# Patient Record
Sex: FEMALE | Race: WHITE | NOT HISPANIC OR LATINO | Employment: OTHER | URBAN - METROPOLITAN AREA
[De-identification: names, ages, dates, MRNs, and addresses within clinical notes are randomized per-mention and may not be internally consistent; named-entity substitution may affect disease eponyms.]

---

## 2017-01-03 DIAGNOSIS — J44.9 CHRONIC OBSTRUCTIVE PULMONARY DISEASE, UNSPECIFIED COPD TYPE: ICD-10-CM

## 2017-01-03 RX ORDER — BUDESONIDE AND FORMOTEROL FUMARATE DIHYDRATE 160; 4.5 UG/1; UG/1
AEROSOL RESPIRATORY (INHALATION)
Qty: 10.2 G | Refills: 0 | Status: SHIPPED | OUTPATIENT
Start: 2017-01-03

## 2017-01-03 RX ORDER — FENOFIBRATE 160 MG/1
TABLET ORAL
Qty: 30 TABLET | Refills: 0 | Status: SHIPPED | OUTPATIENT
Start: 2017-01-03 | End: 2017-03-30 | Stop reason: SDUPTHER

## 2017-01-03 RX ORDER — FAMOTIDINE 40 MG/1
TABLET, FILM COATED ORAL
Qty: 30 TABLET | Refills: 0 | Status: SHIPPED | OUTPATIENT
Start: 2017-01-03 | End: 2017-03-30 | Stop reason: SDUPTHER

## 2017-01-03 RX ORDER — AMLODIPINE BESYLATE 10 MG/1
TABLET ORAL
Qty: 30 TABLET | Refills: 0 | Status: SHIPPED | OUTPATIENT
Start: 2017-01-03 | End: 2017-03-30 | Stop reason: SDUPTHER

## 2017-01-11 ENCOUNTER — OFFICE VISIT (OUTPATIENT)
Dept: CARDIOLOGY | Facility: CLINIC | Age: 67
End: 2017-01-11
Payer: MEDICARE

## 2017-01-11 ENCOUNTER — LAB VISIT (OUTPATIENT)
Dept: LAB | Facility: HOSPITAL | Age: 67
End: 2017-01-11
Attending: FAMILY MEDICINE
Payer: MEDICARE

## 2017-01-11 ENCOUNTER — OFFICE VISIT (OUTPATIENT)
Dept: FAMILY MEDICINE | Facility: CLINIC | Age: 67
End: 2017-01-11
Payer: MEDICARE

## 2017-01-11 VITALS
SYSTOLIC BLOOD PRESSURE: 116 MMHG | OXYGEN SATURATION: 97 % | HEART RATE: 62 BPM | WEIGHT: 158.06 LBS | HEIGHT: 65 IN | BODY MASS INDEX: 26.33 KG/M2 | DIASTOLIC BLOOD PRESSURE: 66 MMHG | TEMPERATURE: 98 F

## 2017-01-11 VITALS
BODY MASS INDEX: 26.38 KG/M2 | WEIGHT: 158.31 LBS | HEIGHT: 65 IN | SYSTOLIC BLOOD PRESSURE: 103 MMHG | DIASTOLIC BLOOD PRESSURE: 64 MMHG | HEART RATE: 66 BPM

## 2017-01-11 DIAGNOSIS — E11.9 TYPE 2 DIABETES MELLITUS WITHOUT COMPLICATION: ICD-10-CM

## 2017-01-11 DIAGNOSIS — J06.9 UPPER RESPIRATORY TRACT INFECTION, UNSPECIFIED TYPE: Primary | ICD-10-CM

## 2017-01-11 DIAGNOSIS — I10 ESSENTIAL HYPERTENSION: ICD-10-CM

## 2017-01-11 DIAGNOSIS — Z95.1 S/P CABG (CORONARY ARTERY BYPASS GRAFT): Primary | ICD-10-CM

## 2017-01-11 DIAGNOSIS — B37.31 VAGINAL YEAST INFECTION: ICD-10-CM

## 2017-01-11 DIAGNOSIS — I73.9 PAD (PERIPHERAL ARTERY DISEASE): ICD-10-CM

## 2017-01-11 DIAGNOSIS — R55 SYNCOPE, UNSPECIFIED SYNCOPE TYPE: ICD-10-CM

## 2017-01-11 DIAGNOSIS — I25.10 CORONARY ARTERY DISEASE INVOLVING NATIVE CORONARY ARTERY OF NATIVE HEART WITHOUT ANGINA PECTORIS: ICD-10-CM

## 2017-01-11 PROCEDURE — 99499 UNLISTED E&M SERVICE: CPT | Mod: S$PBB,,, | Performed by: INTERNAL MEDICINE

## 2017-01-11 PROCEDURE — 3074F SYST BP LT 130 MM HG: CPT | Mod: S$GLB,,, | Performed by: INTERNAL MEDICINE

## 2017-01-11 PROCEDURE — 99999 PR PBB SHADOW E&M-EST. PATIENT-LVL IV: CPT | Mod: PBBFAC,,, | Performed by: NURSE PRACTITIONER

## 2017-01-11 PROCEDURE — 3074F SYST BP LT 130 MM HG: CPT | Mod: S$GLB,,, | Performed by: NURSE PRACTITIONER

## 2017-01-11 PROCEDURE — 99214 OFFICE O/P EST MOD 30 MIN: CPT | Mod: S$GLB,,, | Performed by: INTERNAL MEDICINE

## 2017-01-11 PROCEDURE — 36415 COLL VENOUS BLD VENIPUNCTURE: CPT | Mod: PO

## 2017-01-11 PROCEDURE — 99999 PR PBB SHADOW E&M-EST. PATIENT-LVL II: CPT | Mod: PBBFAC,,, | Performed by: INTERNAL MEDICINE

## 2017-01-11 PROCEDURE — 3078F DIAST BP <80 MM HG: CPT | Mod: S$GLB,,, | Performed by: INTERNAL MEDICINE

## 2017-01-11 PROCEDURE — 99213 OFFICE O/P EST LOW 20 MIN: CPT | Mod: S$GLB,,, | Performed by: NURSE PRACTITIONER

## 2017-01-11 PROCEDURE — 3078F DIAST BP <80 MM HG: CPT | Mod: S$GLB,,, | Performed by: NURSE PRACTITIONER

## 2017-01-11 PROCEDURE — 1159F MED LIST DOCD IN RCRD: CPT | Mod: S$GLB,,, | Performed by: NURSE PRACTITIONER

## 2017-01-11 PROCEDURE — 83036 HEMOGLOBIN GLYCOSYLATED A1C: CPT

## 2017-01-11 PROCEDURE — 1159F MED LIST DOCD IN RCRD: CPT | Mod: S$GLB,,, | Performed by: INTERNAL MEDICINE

## 2017-01-11 RX ORDER — AMOXICILLIN 875 MG/1
875 TABLET, FILM COATED ORAL 2 TIMES DAILY
Qty: 20 TABLET | Refills: 0 | Status: SHIPPED | OUTPATIENT
Start: 2017-01-11 | End: 2017-01-21

## 2017-01-11 RX ORDER — FLUCONAZOLE 150 MG/1
150 TABLET ORAL ONCE
Qty: 1 TABLET | Refills: 0 | Status: SHIPPED | OUTPATIENT
Start: 2017-01-11 | End: 2017-01-11

## 2017-01-11 NOTE — PROGRESS NOTES
Subjective:       Patient ID: Amber Sharp is a 66 y.o. female.    Chief Complaint: Cough (productive); Chills; and Nasal Congestion    Cough   This is a new problem. The current episode started 1 to 4 weeks ago. The problem has been unchanged. The problem occurs constantly. The cough is productive of sputum. Associated symptoms include ear congestion, nasal congestion, postnasal drip, rhinorrhea, a sore throat, shortness of breath and wheezing. Pertinent negatives include no chest pain, chills, ear pain, fever, headaches, heartburn, hemoptysis, myalgias, rash, sweats or weight loss. Treatments tried: symbicort and albuterol. The treatment provided mild relief. Her past medical history is significant for COPD.     Review of Systems   Constitutional: Negative for chills, fever and weight loss.   HENT: Positive for postnasal drip, rhinorrhea and sore throat. Negative for ear pain.    Respiratory: Positive for cough, shortness of breath and wheezing. Negative for hemoptysis.    Cardiovascular: Negative for chest pain.   Gastrointestinal: Negative for heartburn.   Musculoskeletal: Negative for myalgias.   Skin: Negative for rash.   Neurological: Negative for headaches.       Objective:      Physical Exam   Constitutional: She is oriented to person, place, and time. She appears well-nourished. She is active and cooperative.   HENT:   Head: Normocephalic and atraumatic.   Right Ear: Hearing, tympanic membrane, external ear and ear canal normal.   Left Ear: Hearing, tympanic membrane, external ear and ear canal normal.   Nose: Nose normal.   Mouth/Throat: Oropharynx is clear and moist and mucous membranes are normal. No oropharyngeal exudate, posterior oropharyngeal edema or posterior oropharyngeal erythema.   Eyes: Conjunctivae and EOM are normal. Pupils are equal, round, and reactive to light.   Neck: Normal range of motion. Neck supple.   Cardiovascular: Normal rate, regular rhythm, normal heart sounds and intact distal  pulses.    Pulmonary/Chest: Effort normal and breath sounds normal. She has no wheezes. She has no rales.   Abdominal: Soft. Bowel sounds are normal. There is no tenderness.   Lymphadenopathy:     She has no cervical adenopathy.   Neurological: She is alert and oriented to person, place, and time.   Skin: Skin is warm and dry.   Vitals reviewed.      Assessment:       1. Upper respiratory tract infection, unspecified type    2. Vaginal yeast infection        Plan:       Amber was seen today for cough, chills and nasal congestion.    Diagnoses and all orders for this visit:    Upper respiratory tract infection, unspecified type  -     amoxicillin (AMOXIL) 875 MG tablet; Take 1 tablet (875 mg total) by mouth 2 (two) times daily.  Rest and increase fluids  Return if symptoms worsen or fail to improve.    Vaginal yeast infection  -     fluconazole (DIFLUCAN) 150 MG Tab; Take 1 tablet (150 mg total) by mouth once.

## 2017-01-11 NOTE — PROGRESS NOTES
Subjective:    Patient ID:  Amber Sharp is a 66 y.o. female who presents for follow-up of CAD    HPI  She comes with no complaints, no chest pain, no shortness of breath  Did not tolerate imdur, though no chest pain    Review of Systems   Constitution: Negative for decreased appetite, weakness, malaise/fatigue, weight gain and weight loss.   Cardiovascular: Negative for chest pain, dyspnea on exertion, leg swelling, palpitations and syncope.   Respiratory: Negative for cough and shortness of breath.    Gastrointestinal: Negative.    All other systems reviewed and are negative.       Objective:    Physical Exam   Constitutional: She is oriented to person, place, and time. She appears well-developed and well-nourished.   HENT:   Head: Normocephalic.   Eyes: Pupils are equal, round, and reactive to light.   Neck: Normal range of motion. Neck supple. No JVD present. Carotid bruit is not present. No thyromegaly present.   Cardiovascular: Normal rate, regular rhythm, normal heart sounds, intact distal pulses and normal pulses.  PMI is not displaced.  Exam reveals no gallop.    No murmur heard.  Pulmonary/Chest: Effort normal and breath sounds normal.   Abdominal: Soft. Normal appearance. She exhibits no mass. There is no hepatosplenomegaly. There is no tenderness.   Musculoskeletal: Normal range of motion. She exhibits no edema.   Neurological: She is alert and oriented to person, place, and time. She has normal strength and normal reflexes. No sensory deficit.   Skin: Skin is warm and intact.   Psychiatric: She has a normal mood and affect.   Nursing note and vitals reviewed.        Assessment:       1. S/P CABG (coronary artery bypass graft) 1994, x 3    2. Coronary artery disease involving native coronary artery of native heart without angina pectoris    3. Essential hypertension    4. PAD (peripheral artery disease) - PTA to both LE    5. Syncope, unspecified syncope type         Plan:     Continue all cardiac  medications  Regular exercise program  6 m f/u

## 2017-01-11 NOTE — MR AVS SNAPSHOT
[facility]  [address]  [facility] [address]  Phone: [phone]  Fax: [phone]                  Amber Sharp   2017 10:20 AM   Office Visit    Description:  Female : 1950   Provider:  Fany Cruz NP   Department:  [facility]           Reason for Visit     Cough     Chills     Nasal Congestion           Diagnoses this Visit        Comments    Upper respiratory tract infection, unspecified type    -  Primary     Vaginal yeast infection                To Do List           Future Appointments        Provider Department Dept Phone    2017 11:15 AM LAB, [place] [facility] [phone]    2017 10:10 AM LAB, [place] [facility] [phone]    2017 3:00 PM Alina Brothers MD [facility] Hematology [phone]    2017 11:40 AM Mike King MD [place] [phone]      Goals (5 Years of Data)     None       These Medications        Disp Refills Start End    amoxicillin (AMOXIL) 875 MG tablet 20 tablet 0 2017    Take 1 tablet (875 mg total) by mouth 2 (two) times daily. - Oral    Pharmacy: [facility] [id number] [address] Ph #: [phone]       fluconazole (DIFLUCAN) 150 MG Tab 1 tablet 0 2017    Take 1 tablet (150 mg total) by mouth once. - Oral    Pharmacy: [facility] [address] [facility] [address] Ph #: [phone]         [facility] On Call     [facility] Care Line -  Assistance  Registered nurses in the [facility] provide clinical advisement, health education, appointment booking, and other advisory services.  Call for this free service at [phone].             Medications           START taking these NEW medications        Refills    amoxicillin (AMOXIL) 875 MG tablet 0    Sig: Take 1 tablet (875 mg total) by mouth 2 (two) times daily.     Class: Normal    Route: Oral    fluconazole (DIFLUCAN) 150 MG Tab 0    Sig: Take 1 tablet (150 mg total) by mouth once.    Class: Normal    Route: Oral           Verify that the below list of medications is an accurate representation of the medications you are currently taking.  If none reported, the list may be blank. If incorrect, please contact your healthcare provider. Carry this list with you in case of emergency.           Current Medications     albuterol (PROVENTIL HFA) 90 mcg/actuation inhaler Inhale 2 puffs into the lungs every 6 (six) hours as needed.    alendronate (FOSAMAX) 70 MG tablet TAKE ONE TABLET BY MOUTH ONCE DAILY EVERY 7 days    amlodipine (NORVASC) 10 MG tablet TAKE ONE TABLET BY MOUTH ONCE DAILY    aspirin (ECOTRIN) 81 MG EC tablet Take 81 mg by mouth once daily.      atorvastatin (LIPITOR) 40 MG tablet TAKE ONE TABLET BY MOUTH ONCE DAILY    citalopram (CELEXA) 20 MG tablet TAKE ONE TABLET BY MOUTH EVERY DAY    clopidogrel (PLAVIX) 75 mg tablet TAKE ONE TABLET BY MOUTH ONCE DAILY    cyanocobalamin 1,000 mcg/mL injection Inject 1 mL (1,000 mcg total) into the skin every 14 (fourteen) days.    famotidine (PEPCID) 40 MG tablet TAKE ONE TABLET BY MOUTH EVERY DAY    fenofibrate 160 MG Tab TAKE ONE TABLET BY MOUTH ONCE DAILY    fluticasone (FLONASE) 50 mcg/actuation nasal spray 2 sprays in each nostril once daily.    gabapentin (NEURONTIN) 300 MG capsule TAKE ONE CAPSULE BY MOUTH TWICE DAILY    lancets Misc 1 Units by Misc.(Non-Drug; Combo Route) route 3 (three) times daily.    lorazepam (ATIVAN) 1 MG tablet TAKE ONE TABLET BY MOUTH EVERY 8 HOURS AS NEEDED    metformin (GLUCOPHAGE) 500 MG tablet TAKE ONE TABLET BY MOUTH TWICE DAILY WITH FOOD    metoprolol succinate (TOPROL-XL) 25 MG 24 hr tablet TAKE ONE TABLET BY MOUTH ONCE DAILY    montelukast (SINGULAIR) 10 mg tablet Take 1 tablet (10 mg total) by mouth every evening.    SEROQUEL 100 mg Tab Take 1 tablet (100 mg total) by mouth every evening.     "SYMBICORT 160-4.5 mcg/actuation HFAA inhale TWO puffs BY MOUTH and into lungs TWICE DAILY    amoxicillin (AMOXIL) 875 MG tablet Take 1 tablet (875 mg total) by mouth 2 (two) times daily.    fluconazole (DIFLUCAN) 150 MG Tab Take 1 tablet (150 mg total) by mouth once.           Clinical Reference Information           Vital Signs - Last Recorded  Most recent update: 1/11/2017 10:29 AM by Deepika Arteaga LPN    BP Pulse Temp Ht Wt SpO2    116/66 (BP Location: Right arm, Patient Position: Sitting, BP Method: Manual) 62 97.9 °F (36.6 °C) (Oral) 5' 5" (1.651 m) 71.7 kg (158 lb 1.1 oz) 97%    BMI                26.3 kg/m2          Blood Pressure          Most Recent Value    BP  116/66      Allergies as of 1/11/2017     Adhesive    Carvedilol    Lisinopril    Losartan      Immunizations Administered on Date of Encounter - 1/11/2017     None      "

## 2017-01-12 LAB
ESTIMATED AVG GLUCOSE: 128 MG/DL
HBA1C MFR BLD HPLC: 6.1 %

## 2017-01-18 ENCOUNTER — LAB VISIT (OUTPATIENT)
Dept: LAB | Facility: HOSPITAL | Age: 67
End: 2017-01-18
Attending: INTERNAL MEDICINE
Payer: MEDICARE

## 2017-01-18 DIAGNOSIS — E53.8 B12 DEFICIENCY: ICD-10-CM

## 2017-01-18 LAB
ALBUMIN SERPL BCP-MCNC: 3.4 G/DL
ALP SERPL-CCNC: 47 U/L
ALT SERPL W/O P-5'-P-CCNC: 20 U/L
ANION GAP SERPL CALC-SCNC: 13 MMOL/L
AST SERPL-CCNC: 24 U/L
BASOPHILS # BLD AUTO: 0.02 K/UL
BASOPHILS NFR BLD: 0.3 %
BILIRUB SERPL-MCNC: 0.4 MG/DL
BUN SERPL-MCNC: 10 MG/DL
CALCIUM SERPL-MCNC: 8.9 MG/DL
CHLORIDE SERPL-SCNC: 107 MMOL/L
CO2 SERPL-SCNC: 22 MMOL/L
CREAT SERPL-MCNC: 1.1 MG/DL
DIFFERENTIAL METHOD: ABNORMAL
EOSINOPHIL # BLD AUTO: 0.3 K/UL
EOSINOPHIL NFR BLD: 4.2 %
ERYTHROCYTE [DISTWIDTH] IN BLOOD BY AUTOMATED COUNT: 16.2 %
EST. GFR  (AFRICAN AMERICAN): >60 ML/MIN/1.73 M^2
EST. GFR  (NON AFRICAN AMERICAN): 53 ML/MIN/1.73 M^2
GLUCOSE SERPL-MCNC: 180 MG/DL
HCT VFR BLD AUTO: 33.8 %
HGB BLD-MCNC: 11 G/DL
LYMPHOCYTES # BLD AUTO: 2.4 K/UL
LYMPHOCYTES NFR BLD: 39.8 %
MCH RBC QN AUTO: 28.7 PG
MCHC RBC AUTO-ENTMCNC: 32.5 %
MCV RBC AUTO: 88 FL
MONOCYTES # BLD AUTO: 0.5 K/UL
MONOCYTES NFR BLD: 7.9 %
NEUTROPHILS # BLD AUTO: 2.8 K/UL
NEUTROPHILS NFR BLD: 47.8 %
PLATELET # BLD AUTO: 405 K/UL
PMV BLD AUTO: 9.5 FL
POTASSIUM SERPL-SCNC: 4.4 MMOL/L
PROT SERPL-MCNC: 7.4 G/DL
RBC # BLD AUTO: 3.83 M/UL
SODIUM SERPL-SCNC: 142 MMOL/L
VIT B12 SERPL-MCNC: 242 PG/ML
WBC # BLD AUTO: 5.93 K/UL

## 2017-01-18 PROCEDURE — 85025 COMPLETE CBC W/AUTO DIFF WBC: CPT | Mod: PO

## 2017-01-18 PROCEDURE — 80053 COMPREHEN METABOLIC PANEL: CPT | Mod: PO

## 2017-01-18 PROCEDURE — 82607 VITAMIN B-12: CPT

## 2017-01-18 PROCEDURE — 36415 COLL VENOUS BLD VENIPUNCTURE: CPT | Mod: PO

## 2017-01-26 ENCOUNTER — OFFICE VISIT (OUTPATIENT)
Dept: HEMATOLOGY/ONCOLOGY | Facility: CLINIC | Age: 67
End: 2017-01-26
Payer: MEDICARE

## 2017-01-26 VITALS
DIASTOLIC BLOOD PRESSURE: 61 MMHG | TEMPERATURE: 99 F | SYSTOLIC BLOOD PRESSURE: 118 MMHG | RESPIRATION RATE: 15 BRPM | HEART RATE: 67 BPM | WEIGHT: 157.88 LBS | BODY MASS INDEX: 26.3 KG/M2 | HEIGHT: 65 IN

## 2017-01-26 DIAGNOSIS — E53.8 B12 DEFICIENCY: Primary | ICD-10-CM

## 2017-01-26 DIAGNOSIS — Z95.1 S/P CABG (CORONARY ARTERY BYPASS GRAFT): ICD-10-CM

## 2017-01-26 DIAGNOSIS — C50.011 MALIGNANT NEOPLASM INVOLVING BOTH NIPPLE AND AREOLA OF RIGHT BREAST IN FEMALE: ICD-10-CM

## 2017-01-26 DIAGNOSIS — I25.10 CORONARY ARTERY DISEASE INVOLVING NATIVE CORONARY ARTERY OF NATIVE HEART WITHOUT ANGINA PECTORIS: ICD-10-CM

## 2017-01-26 PROCEDURE — 3074F SYST BP LT 130 MM HG: CPT | Mod: S$GLB,,, | Performed by: INTERNAL MEDICINE

## 2017-01-26 PROCEDURE — 99499 UNLISTED E&M SERVICE: CPT | Mod: S$PBB,,, | Performed by: INTERNAL MEDICINE

## 2017-01-26 PROCEDURE — 99214 OFFICE O/P EST MOD 30 MIN: CPT | Mod: S$GLB,,, | Performed by: INTERNAL MEDICINE

## 2017-01-26 PROCEDURE — 1159F MED LIST DOCD IN RCRD: CPT | Mod: S$GLB,,, | Performed by: INTERNAL MEDICINE

## 2017-01-26 PROCEDURE — 99999 PR PBB SHADOW E&M-EST. PATIENT-LVL III: CPT | Mod: PBBFAC,,, | Performed by: INTERNAL MEDICINE

## 2017-01-26 PROCEDURE — 3078F DIAST BP <80 MM HG: CPT | Mod: S$GLB,,, | Performed by: INTERNAL MEDICINE

## 2017-01-26 RX ORDER — KETOCONAZOLE 20 MG/G
CREAM TOPICAL
Refills: 5 | COMMUNITY
Start: 2017-01-18

## 2017-01-27 DIAGNOSIS — Z76.89 ENCOUNTER TO ESTABLISH CARE: Primary | ICD-10-CM

## 2017-01-27 NOTE — PROGRESS NOTES
Subjective:       Patient ID: Amber Sharp is a 66 y.o. female.    Chief Complaint: f/u of labs  HPI:   A 65-year-old  woman coming in followup. The patient has a remote    history of breast cancer that was treated in the past. This was over seven    years ago in 2007, treated at [facility] with mitoxantrone and Cytoxan because    of extensive coronary artery disease and peripheral vascular disease. She    underwent double mastectomy, radiation therapy and adjuvant chemotherapy. There  are no breast left for mammograms. Also had severe B12 deficiency. She swears  by compliance. This time, she also has an extensive medical hx of DM, HTN , dyslipidemia, CAD, s/p CABG, on multiple meds for control and depression    REVIEW OF SYSTEMS:     CONSTITUTIONAL: The patient denies any weight change. There is no apparent    change in appetite, fever, night sweats, headaches, fatigue+ dizziness, or    weakness.      SKIN: Denies rash, issues with nails, non-healing sores, bleeding, blotching    skin or abnormal bruising. Denies new moles or changes to existing moles.      BREASTS: There is no swelling around breasts or nipple discharge.    EYES: Denies eye pain, blurred vision, swelling, redness or discharge.      ENT AND MOUTH: Denies runny nose, stuffiness, sinus trouble or sores. Denies    nosebleeds. Denies, hoarseness, change in voice or swelling in front of the    neck.      CARDIOVASCULAR: Denies chest pain, discomfort or palpitations. Denies neck    swelling or episodes of passing out.      RESPIRATORY: Denies cough, sputum production, blood in sputum, and denies    shortness of breath.      GI: Denies trouble swallowing, indigestion, heartburn, abdominal pain, nausea,    vomiting, diarrhea, altered bowel habits, blood in stool, discoloration of    stools, change in nature of stool, bloating, increased abdominal girth.      GENITOURINARY: No discharge. No pelvic pain or lumps. No rash around groin or  lesions.  No urinary frequency, hesitation, painful urination or blood in    urine. Denies incontinence. No problems with intercourse.      MUSCULOSKELETAL: Denies neck or back pain. Denies weakness in arms or legs,    joint problems or distended inflamed veins in legs. Denies swelling or abnormal  glands.      NEUROLOGICAL: Denies tingling, numbness, altered mentation changes to nerve    function in the face, weakness to one or both of the body. Denies changes to    gait and denies multiple falls or accidents.    Hx of depression+    PHYSICAL EXAM:     Vitals:    01/26/17 1453   BP: 118/61   Pulse: 67   Resp: 15   Temp: 98.6 °F (37 °C)       GENERAL: Comfortable looking patient. Patient is in no distress.  Awake, alert and oriented to time, person and place.  No anxiety, or agitation.      HEENT: Normal conjunctivae and eyelids. WNL.  PERRLA 3 to 4 mm. No icterus, no pallor, no congestion, and no discharge noted.     NECK:  Supple. Trachea is central.  No crepitus.  No JVD or masses.    RESPIRATORY:  No intercostal retractions.  No dullness to percussion.  Chest is clear to auscultation.  No rales, rhonchi or wheezes.  No crepitus.  Good air entry bilaterally.    CARDIOVASCULAR:  S1 and S2 are normally heard without murmurs or gallops.  All peripheral pulses are present.    ABDOMEN:  Normal abdomen.  No hepatosplenomegaly.  No free fluid.  Bowel sounds are present.  No hernia noted. No masses.  No rebound or tenderness.  No guarding or rigidity.  Umbilicus is midline.    LYMPHATICS:  No axillary, cervical, supraclavicular, submental, or inguinal lymphadenopathy.    SKIN/MUSCULOSKELETAL:  There is no evidence of excoriation marks or ecchmosis.  No rashes.  No cyanosis.  No clubbing.  No joint or skeletal deformities noted.  Normal range of motion.    NEUROLOGIC:  Higher functions are appropriate.  No cranial nerve deficits.  Normal thai.  Normal strength.  Motor and sensory functions are normal.  Deep tendon reflexes are  normal.    GENITAL/RECTAL:  Exams are deferred.      Laboratory:     CBC:  Lab Results   Component Value Date    WBC 5.93 01/18/2017    RBC 3.83 (L) 01/18/2017    HGB 11.0 (L) 01/18/2017    HCT 33.8 (L) 01/18/2017    MCV 88 01/18/2017    MCH 28.7 01/18/2017    MCHC 32.5 01/18/2017    RDW 16.2 (H) 01/18/2017     (H) 01/18/2017    MPV 9.5 01/18/2017    GRAN 2.8 01/18/2017    GRAN 47.8 01/18/2017    LYMPH 2.4 01/18/2017    LYMPH 39.8 01/18/2017    MONO 0.5 01/18/2017    MONO 7.9 01/18/2017    EOS 0.3 01/18/2017    BASO 0.02 01/18/2017    EOSINOPHIL 4.2 01/18/2017    BASOPHIL 0.3 01/18/2017       BMP: BMP  Lab Results   Component Value Date     01/18/2017    K 4.4 01/18/2017     01/18/2017    CO2 22 (L) 01/18/2017    BUN 10 01/18/2017    CREATININE 1.1 01/18/2017    CALCIUM 8.9 01/18/2017    ANIONGAP 13 01/18/2017    ESTGFRAFRICA >60 01/18/2017    EGFRNONAA 53 (A) 01/18/2017       LFT:   Lab Results   Component Value Date    ALT 20 01/18/2017    AST 24 01/18/2017    ALKPHOS 47 (L) 01/18/2017    BILITOT 0.4 01/18/2017     Lab Results   Component Value Date    DJDXUKOI75 242 01/18/2017       Assessment/Plan:       1. B12 deficiency    2. Malignant neoplasm involving both nipple and areola of right breast in female    3. S/P CABG (coronary artery bypass graft) 1994, x 3    4. Coronary artery disease involving native coronary artery of native heart without angina pectoris        1. B12 still not clinically sufficient , will add SL b12 daily to bi monthly injections  Pt getting these shots at pharmacy.   Cont care with cards and pcp fpr all other issue  rtc 3 months with cbc, cmp,b12

## 2017-01-31 ENCOUNTER — OFFICE VISIT (OUTPATIENT)
Dept: FAMILY MEDICINE | Facility: CLINIC | Age: 67
End: 2017-01-31
Payer: MEDICARE

## 2017-01-31 VITALS
WEIGHT: 158.06 LBS | DIASTOLIC BLOOD PRESSURE: 68 MMHG | SYSTOLIC BLOOD PRESSURE: 118 MMHG | OXYGEN SATURATION: 98 % | BODY MASS INDEX: 26.33 KG/M2 | HEART RATE: 73 BPM | HEIGHT: 65 IN

## 2017-01-31 DIAGNOSIS — J31.0 CHRONIC RHINITIS: ICD-10-CM

## 2017-01-31 DIAGNOSIS — E08.00 DIABETES MELLITUS DUE TO UNDERLYING CONDITION WITH HYPEROSMOLARITY WITHOUT COMA, WITHOUT LONG-TERM CURRENT USE OF INSULIN: Primary | ICD-10-CM

## 2017-01-31 DIAGNOSIS — R42 VERTIGO: ICD-10-CM

## 2017-01-31 DIAGNOSIS — I10 ESSENTIAL HYPERTENSION: ICD-10-CM

## 2017-01-31 PROCEDURE — 99999 PR PBB SHADOW E&M-EST. PATIENT-LVL III: CPT | Mod: PBBFAC,,, | Performed by: FAMILY MEDICINE

## 2017-01-31 PROCEDURE — 3074F SYST BP LT 130 MM HG: CPT | Mod: S$GLB,,, | Performed by: FAMILY MEDICINE

## 2017-01-31 PROCEDURE — 3078F DIAST BP <80 MM HG: CPT | Mod: S$GLB,,, | Performed by: FAMILY MEDICINE

## 2017-01-31 PROCEDURE — 99214 OFFICE O/P EST MOD 30 MIN: CPT | Mod: S$GLB,,, | Performed by: FAMILY MEDICINE

## 2017-01-31 PROCEDURE — 99499 UNLISTED E&M SERVICE: CPT | Mod: S$PBB,,, | Performed by: FAMILY MEDICINE

## 2017-01-31 PROCEDURE — 1159F MED LIST DOCD IN RCRD: CPT | Mod: S$GLB,,, | Performed by: FAMILY MEDICINE

## 2017-01-31 RX ORDER — MECLIZINE HYDROCHLORIDE 25 MG/1
25 TABLET ORAL 3 TIMES DAILY PRN
Qty: 60 TABLET | Refills: 1 | Status: SHIPPED | OUTPATIENT
Start: 2017-01-31 | End: 2017-03-01 | Stop reason: SDUPTHER

## 2017-01-31 RX ORDER — FLUTICASONE PROPIONATE 50 MCG
2 SPRAY, SUSPENSION (ML) NASAL DAILY
Qty: 16 G | Refills: 11 | Status: SHIPPED | OUTPATIENT
Start: 2017-01-31 | End: 2017-12-22

## 2017-01-31 RX ORDER — INSULIN PUMP SYRINGE, 3 ML
EACH MISCELLANEOUS
Qty: 1 EACH | Refills: 0 | Status: SHIPPED | OUTPATIENT
Start: 2017-01-31

## 2017-01-31 RX ORDER — LANCETS
1 EACH MISCELLANEOUS 2 TIMES DAILY
Qty: 100 EACH | Refills: 3 | Status: SHIPPED | OUTPATIENT
Start: 2017-01-31 | End: 2017-05-03 | Stop reason: SDUPTHER

## 2017-01-31 NOTE — PROGRESS NOTES
Subjective:       Patient ID: Amber Sharp is a 66 y.o. female.    Chief Complaint: Dizziness (vertigo )    HPI     C/o vertigo x 3 days.  Movement of head reproduces dizziness. Reports that meclizine helped in past.  No c/o cp or sob at rest or exertion.      Dm2 with neuropathy:  cks 4 times a week  Fbs:   On metformin  a1c 1/11/17-6.1%  utd with eye dr     Reports chronic rhinorrhea. Reports using flonase in past.     Review of Systems      Review of Systems   Constitutional: Negative for fever and chills.   HENT: Negative for hearing loss and neck stiffness.    Eyes: Negative for redness and itching.   Respiratory: Negative for cough and choking.    Cardiovascular: Negative for chest pain and leg swelling.  Abdomen: Negative for abdominal pain and blood in stool.   Genitourinary: Negative for dysuria and flank pain.   Musculoskeletal: Negative for back pain and gait problem.   Neurological: Negative for headaches.   Hematological: Negative for adenopathy.   Psychiatric/Behavioral: Negative for behavioral problems.     Objective:      Physical Exam   Constitutional: She appears well-developed.   HENT:   Head: Normocephalic and atraumatic.   Eyes: Conjunctivae are normal. Pupils are equal, round, and reactive to light.   Neck: Normal range of motion.   Cardiovascular: Normal rate and regular rhythm.    No murmur heard.  Pulmonary/Chest: Effort normal and breath sounds normal. She has no wheezes.   Lymphadenopathy:     She has no cervical adenopathy.       Assessment:       1. Diabetes mellitus due to underlying condition with hyperosmolarity without coma, without long-term current use of insulin    2. Vertigo    3. Essential hypertension    4. Chronic rhinitis        Plan:       Diabetes mellitus due to underlying condition with hyperosmolarity without coma, without long-term current use of insulin    Vertigo    Essential hypertension    Chronic rhinitis    Other orders  -     meclizine (ANTIVERT) 25 mg  tablet; Take 1 tablet (25 mg total) by mouth 3 (three) times daily as needed.  Dispense: 60 tablet; Refill: 1  -     blood sugar diagnostic Strp; 1 strip by Misc.(Non-Drug; Combo Route) route 2 (two) times daily.  Dispense: 100 strip; Refill: 3  -     lancets Misc; 1 Units by Misc.(Non-Drug; Combo Route) route 2 (two) times daily.  Dispense: 100 each; Refill: 3  -     blood-glucose meter kit; Use as instructed  Dispense: 1 each; Refill: 0  -     fluticasone (FLONASE) 50 mcg/actuation nasal spray; 2 sprays by Each Nare route once daily.  Dispense: 16 g; Refill: 11      Plan:  Start antivert  Restart flonase

## 2017-01-31 NOTE — MR AVS SNAPSHOT
[facility]  [address] [facility] [address] [place] [address]  Phone: [phone]  Fax: [phone]                  Amber Sharp   2017 11:40 AM   Office Visit    Description:  Female : 1950   Provider:  Mike King MD   Department:  [facility]           Reason for Visit     Dizziness                To Do List           Future Appointments        Provider Department Dept Phone    2017 10:30 AM LABORATORY, [place] [facility] [phone]    2017 2:40 PM Alina Brothers MD [facility] Hematology [phone]    5/3/2017 11:40 AM Mike King MD [facility] [phone]      Goals (5 Years of Data)     None       These Medications        Disp Refills Start End    meclizine (ANTIVERT) 25 mg tablet 60 tablet 1 2017     Take 1 tablet (25 mg total) by mouth 3 (three) times daily as needed. - Oral    Pharmacy: [facility] [place]  [id number]  Ph #: [phone]       blood sugar diagnostic Strp 100 strip 3 2017     1 strip by Misc.(Non-Drug; Combo Route) route 2 (two) times daily. - Misc.(Non-Drug; Combo Route)    Pharmacy: [place]  [address] [facility]  Ph #: [phone]       lancets Misc 100 each 3 2017     1 Units by Misc.(Non-Drug; Combo Route) route 2 (two) times daily. - Misc.(Non-Drug; Combo Route)    Pharmacy: [facility] [place]  [id number]  Ph #: [phone]       blood-glucose meter kit 1 each 0 2017     Use as instructed    Pharmacy: [facility]  [id number]  Ph #: [phone]       fluticasone (FLONASE) 50 mcg/actuation nasal spray 16 g 11 2017     2 sprays by Each Nare route once daily. - Each Nare    Pharmacy: [facility] [place] [address] Ph #: [phone]         [facility]     [facility]  Line - / Assistance  Registered nurses in the [facility] provide clinical advisement, health education, appointment booking, and other advisory services.  Call for this free service at [phone].             Medications           START taking these NEW medications        Refills    meclizine (ANTIVERT) 25 mg tablet 1    Sig: Take 1 tablet (25 mg total) by mouth 3 (three) times daily as needed.    Class: Normal    Route: Oral    blood sugar diagnostic Strp 3    Si strip by Misc.(Non-Drug; Combo Route) route 2 (two) times daily.    Class: Normal    Route: Misc.(Non-Drug; Combo Route)    lancets Misc 3    Si Units by Misc.(Non-Drug; Combo Route) route 2 (two) times daily.    Class: Normal    Route: Misc.(Non-Drug; Combo Route)    blood-glucose meter kit 0    Sig: Use as instructed    Class: Normal    fluticasone (FLONASE) 50 mcg/actuation nasal spray 11    Si sprays by Each Nare route once daily.    Class: Normal    Route: Each Nare           Verify that the below list of medications is an accurate representation of the medications you are currently taking.  If none reported, the list may be blank. If incorrect, please contact your healthcare provider. Carry this list with you in case of emergency.           Current Medications     albuterol (PROVENTIL HFA) 90 mcg/actuation inhaler Inhale 2 puffs into the lungs every 6 (six) hours as needed.    alendronate (FOSAMAX) 70 MG tablet TAKE ONE TABLET BY MOUTH ONCE DAILY EVERY 7 days    amlodipine (NORVASC) 10 MG tablet TAKE ONE TABLET BY MOUTH ONCE DAILY    aspirin (ECOTRIN) 81 MG EC tablet Take 81 mg by mouth once daily.      atorvastatin (LIPITOR) 40 MG tablet TAKE ONE TABLET BY MOUTH ONCE DAILY    citalopram (CELEXA) 20 MG tablet TAKE ONE TABLET BY MOUTH EVERY DAY    clopidogrel (PLAVIX) 75 mg tablet TAKE ONE TABLET BY MOUTH ONCE DAILY    cyanocobalamin 1,000 mcg/mL injection Inject 1 mL (1,000 mcg total) into the skin every 14 (fourteen)  "days.    famotidine (PEPCID) 40 MG tablet TAKE ONE TABLET BY MOUTH EVERY DAY    fenofibrate 160 MG Tab TAKE ONE TABLET BY MOUTH ONCE DAILY    gabapentin (NEURONTIN) 300 MG capsule TAKE ONE CAPSULE BY MOUTH TWICE DAILY    ketoconazole (NIZORAL) 2 % cream APPLY TO THE AFFECTED AREA twice daily use with desonide    lancets Misc 1 Units by Misc.(Non-Drug; Combo Route) route 3 (three) times daily.    lorazepam (ATIVAN) 1 MG tablet TAKE ONE TABLET BY MOUTH EVERY 8 HOURS AS NEEDED    metformin (GLUCOPHAGE) 500 MG tablet TAKE ONE TABLET BY MOUTH TWICE DAILY WITH FOOD    metoprolol succinate (TOPROL-XL) 25 MG 24 hr tablet TAKE ONE TABLET BY MOUTH ONCE DAILY    montelukast (SINGULAIR) 10 mg tablet Take 1 tablet (10 mg total) by mouth every evening.    SEROQUEL 100 mg Tab Take 1 tablet (100 mg total) by mouth every evening.    SYMBICORT 160-4.5 mcg/actuation HFAA inhale TWO puffs BY MOUTH and into lungs TWICE DAILY    blood sugar diagnostic Strp 1 strip by Misc.(Non-Drug; Combo Route) route 2 (two) times daily.    blood-glucose meter kit Use as instructed    fluticasone (FLONASE) 50 mcg/actuation nasal spray 2 sprays by Each Nare route once daily.    lancets Misc 1 Units by Misc.(Non-Drug; Combo Route) route 2 (two) times daily.    meclizine (ANTIVERT) 25 mg tablet Take 1 tablet (25 mg total) by mouth 3 (three) times daily as needed.           Clinical Reference Information           Vital Signs - Last Recorded  Most recent update: 1/31/2017 11:35 AM by Dinah Shane LPN    BP Pulse Ht Wt SpO2 BMI    118/68 73 5' 5" (1.651 m) 71.7 kg (158 lb 1.1 oz) 98% 26.3 kg/m2      Blood Pressure          Most Recent Value    BP  118/68      Allergies as of 1/31/2017     Adhesive    Carvedilol    Lisinopril    Losartan      Immunizations Administered on Date of Encounter - 1/31/2017     None      "

## 2017-02-01 RX ORDER — ATORVASTATIN CALCIUM 40 MG/1
TABLET, FILM COATED ORAL
Qty: 90 TABLET | Refills: 3 | Status: ON HOLD | OUTPATIENT
Start: 2017-02-01 | End: 2018-01-08

## 2017-02-02 ENCOUNTER — TELEPHONE (OUTPATIENT)
Dept: FAMILY MEDICINE | Facility: CLINIC | Age: 67
End: 2017-02-02

## 2017-02-02 NOTE — TELEPHONE ENCOUNTER
----- Message from Roxy Patel sent at 2/2/2017  2:53 PM CST -----  Contact: [facility] [phone]  She is calling to request an order for the glucometer and diabetic supplies.  They also need the clinical notes and the medical necessity form. Please fax it [phone].  Thank you!

## 2017-03-01 DIAGNOSIS — F31.9 BIPOLAR AFFECTIVE DISORDER: ICD-10-CM

## 2017-03-03 RX ORDER — MECLIZINE HYDROCHLORIDE 25 MG/1
TABLET ORAL
Qty: 60 TABLET | Refills: 5 | Status: SHIPPED | OUTPATIENT
Start: 2017-03-03 | End: 2017-05-03

## 2017-03-03 RX ORDER — QUETIAPINE FUMARATE 100 MG/1
TABLET, FILM COATED ORAL
Qty: 30 TABLET | Refills: 5 | Status: SHIPPED | OUTPATIENT
Start: 2017-03-03 | End: 2017-08-21 | Stop reason: SDUPTHER

## 2017-04-02 RX ORDER — FENOFIBRATE 160 MG/1
TABLET ORAL
Qty: 30 TABLET | Refills: 5 | Status: SHIPPED | OUTPATIENT
Start: 2017-04-02 | End: 2017-09-06 | Stop reason: SDUPTHER

## 2017-04-02 RX ORDER — FAMOTIDINE 40 MG/1
TABLET, FILM COATED ORAL
Qty: 30 TABLET | Refills: 5 | Status: SHIPPED | OUTPATIENT
Start: 2017-04-02 | End: 2017-07-20

## 2017-04-02 RX ORDER — AMLODIPINE BESYLATE 10 MG/1
TABLET ORAL
Qty: 30 TABLET | Refills: 5 | Status: SHIPPED | OUTPATIENT
Start: 2017-04-02 | End: 2017-09-06 | Stop reason: SDUPTHER

## 2017-04-02 RX ORDER — METOPROLOL SUCCINATE 25 MG/1
TABLET, EXTENDED RELEASE ORAL
Qty: 30 TABLET | Refills: 5 | Status: SHIPPED | OUTPATIENT
Start: 2017-04-02 | End: 2017-10-20 | Stop reason: SDUPTHER

## 2017-04-18 ENCOUNTER — PATIENT OUTREACH (OUTPATIENT)
Dept: ADMINISTRATIVE | Facility: HOSPITAL | Age: 67
End: 2017-04-18

## 2017-04-24 ENCOUNTER — TELEPHONE (OUTPATIENT)
Dept: HEMATOLOGY/ONCOLOGY | Facility: CLINIC | Age: 67
End: 2017-04-24

## 2017-04-24 NOTE — TELEPHONE ENCOUNTER
Left message for pt to return call to clinic to reschedule her missed lab appointment & that Dr. Brothers needs the results prior to her appointment. Number provided for pt to call back.

## 2017-04-26 ENCOUNTER — PATIENT OUTREACH (OUTPATIENT)
Dept: ADMINISTRATIVE | Facility: HOSPITAL | Age: 67
End: 2017-04-26

## 2017-04-26 NOTE — LETTER
April 26, 2017    Amber Sharp  [address] [place] [address]             [facility]  [address]  [facility] [address]  Phone: [phone] Dear Ms. Sharp:    We have tried to reach you by mychart unsuccessfully.    [facility] is committed to your overall health.  To help you get the most out of each of your visits, we will review your information to make sure you are up to date on all of your recommended tests and/or procedures.       Dr. King      has found that you may be due for:     Osteoporosis screening (DEXA SCAN)   Pneumonia immunization   Annual Dilated Eye Exam     Also, if you have any type of Advanced Directives, please bring them with you to your office visit so we may scan them into your chart.       If you have had any of the above done at another facility, please bring the records or information with you so that your record at [facility] will be complete.      If you are currently taking medication , please bring it with you to your appointment for review.     Sincerely,    Ligia Ramesh  Clinical Care Coordinator  [facility] [address] [place] [address]  Phone: [phone]   Fax: [phone]

## 2017-05-03 ENCOUNTER — OFFICE VISIT (OUTPATIENT)
Dept: FAMILY MEDICINE | Facility: CLINIC | Age: 67
End: 2017-05-03
Payer: MEDICARE

## 2017-05-03 ENCOUNTER — LAB VISIT (OUTPATIENT)
Dept: LAB | Facility: HOSPITAL | Age: 67
End: 2017-05-03
Attending: INTERNAL MEDICINE
Payer: MEDICARE

## 2017-05-03 VITALS
OXYGEN SATURATION: 98 % | DIASTOLIC BLOOD PRESSURE: 64 MMHG | BODY MASS INDEX: 24.5 KG/M2 | SYSTOLIC BLOOD PRESSURE: 104 MMHG | WEIGHT: 147.06 LBS | HEART RATE: 89 BPM | HEIGHT: 65 IN

## 2017-05-03 DIAGNOSIS — I25.10 CORONARY ARTERY DISEASE INVOLVING NATIVE CORONARY ARTERY OF NATIVE HEART WITHOUT ANGINA PECTORIS: ICD-10-CM

## 2017-05-03 DIAGNOSIS — R63.4 WEIGHT LOSS, NON-INTENTIONAL: ICD-10-CM

## 2017-05-03 DIAGNOSIS — E08.00 DIABETES MELLITUS DUE TO UNDERLYING CONDITION WITH HYPEROSMOLARITY WITHOUT COMA, WITHOUT LONG-TERM CURRENT USE OF INSULIN: Primary | ICD-10-CM

## 2017-05-03 DIAGNOSIS — Z78.0 MENOPAUSE: ICD-10-CM

## 2017-05-03 DIAGNOSIS — E53.8 B12 DEFICIENCY: ICD-10-CM

## 2017-05-03 DIAGNOSIS — I10 ESSENTIAL HYPERTENSION: ICD-10-CM

## 2017-05-03 LAB
ALBUMIN SERPL BCP-MCNC: 3.6 G/DL
ALP SERPL-CCNC: 58 U/L
ALT SERPL W/O P-5'-P-CCNC: 12 U/L
ANION GAP SERPL CALC-SCNC: 13 MMOL/L
AST SERPL-CCNC: 22 U/L
BASOPHILS # BLD AUTO: 0.02 K/UL
BASOPHILS NFR BLD: 0.2 %
BILIRUB SERPL-MCNC: 0.5 MG/DL
BUN SERPL-MCNC: 14 MG/DL
CALCIUM SERPL-MCNC: 9.2 MG/DL
CHLORIDE SERPL-SCNC: 106 MMOL/L
CO2 SERPL-SCNC: 24 MMOL/L
CREAT SERPL-MCNC: 1.2 MG/DL
DIFFERENTIAL METHOD: ABNORMAL
EOSINOPHIL # BLD AUTO: 0.2 K/UL
EOSINOPHIL NFR BLD: 1.6 %
ERYTHROCYTE [DISTWIDTH] IN BLOOD BY AUTOMATED COUNT: 16.2 %
EST. GFR  (AFRICAN AMERICAN): 55 ML/MIN/1.73 M^2
EST. GFR  (NON AFRICAN AMERICAN): 48 ML/MIN/1.73 M^2
GLUCOSE SERPL-MCNC: 129 MG/DL
HCT VFR BLD AUTO: 33.6 %
HGB BLD-MCNC: 11 G/DL
LYMPHOCYTES # BLD AUTO: 2.7 K/UL
LYMPHOCYTES NFR BLD: 27 %
MCH RBC QN AUTO: 27.8 PG
MCHC RBC AUTO-ENTMCNC: 32.7 %
MCV RBC AUTO: 85 FL
MONOCYTES # BLD AUTO: 0.8 K/UL
MONOCYTES NFR BLD: 7.6 %
NEUTROPHILS # BLD AUTO: 6.3 K/UL
NEUTROPHILS NFR BLD: 63.6 %
PLATELET # BLD AUTO: 481 K/UL
PMV BLD AUTO: 10.2 FL
POTASSIUM SERPL-SCNC: 4.2 MMOL/L
PROT SERPL-MCNC: 8.5 G/DL
RBC # BLD AUTO: 3.95 M/UL
SODIUM SERPL-SCNC: 143 MMOL/L
VIT B12 SERPL-MCNC: 693 PG/ML
WBC # BLD AUTO: 9.89 K/UL

## 2017-05-03 PROCEDURE — 3074F SYST BP LT 130 MM HG: CPT | Mod: S$GLB,,, | Performed by: FAMILY MEDICINE

## 2017-05-03 PROCEDURE — G0009 ADMIN PNEUMOCOCCAL VACCINE: HCPCS | Mod: S$GLB,,, | Performed by: FAMILY MEDICINE

## 2017-05-03 PROCEDURE — 1160F RVW MEDS BY RX/DR IN RCRD: CPT | Mod: S$GLB,,, | Performed by: FAMILY MEDICINE

## 2017-05-03 PROCEDURE — 99999 PR PBB SHADOW E&M-EST. PATIENT-LVL III: CPT | Mod: PBBFAC,,, | Performed by: FAMILY MEDICINE

## 2017-05-03 PROCEDURE — 90670 PCV13 VACCINE IM: CPT | Mod: S$GLB,,, | Performed by: FAMILY MEDICINE

## 2017-05-03 PROCEDURE — 99499 UNLISTED E&M SERVICE: CPT | Mod: S$PBB,,, | Performed by: FAMILY MEDICINE

## 2017-05-03 PROCEDURE — 99214 OFFICE O/P EST MOD 30 MIN: CPT | Mod: 25,S$GLB,, | Performed by: FAMILY MEDICINE

## 2017-05-03 PROCEDURE — 3078F DIAST BP <80 MM HG: CPT | Mod: S$GLB,,, | Performed by: FAMILY MEDICINE

## 2017-05-03 RX ORDER — AZELASTINE 1 MG/ML
1 SPRAY, METERED NASAL 2 TIMES DAILY
Qty: 30 ML | Refills: 11 | Status: SHIPPED | OUTPATIENT
Start: 2017-05-03 | End: 2017-12-22

## 2017-05-03 RX ORDER — VITAMIN B COMPLEX
2500 TABLET ORAL 4 TIMES DAILY
COMMUNITY
End: 2017-10-20

## 2017-05-03 RX ORDER — HYDROCORTISONE 25 MG/G
1 CREAM TOPICAL 2 TIMES DAILY
Refills: 2 | COMMUNITY
Start: 2017-03-30 | End: 2017-07-20 | Stop reason: SDUPTHER

## 2017-05-03 RX ORDER — FLUOCINONIDE TOPICAL SOLUTION USP, 0.05% 0.5 MG/ML
SOLUTION TOPICAL
Refills: 5 | COMMUNITY
Start: 2017-01-30 | End: 2017-05-12

## 2017-05-03 RX ORDER — KETOCONAZOLE 20 MG/ML
SHAMPOO, SUSPENSION TOPICAL
Refills: 5 | COMMUNITY
Start: 2017-02-23 | End: 2017-07-20

## 2017-05-03 RX ORDER — TRIAMCINOLONE ACETONIDE 1 MG/G
OINTMENT TOPICAL
Refills: 2 | COMMUNITY
Start: 2017-03-30 | End: 2017-07-20 | Stop reason: SDUPTHER

## 2017-05-03 NOTE — PROGRESS NOTES
Subjective:       Patient ID: Amber Sharp is a 66 y.o. female.    Chief Complaint: Diabetes    HPI       Dm2 with neuropathy:  cks 4 times a week  Fbs:   On metformin  a1c 1/11/17-6.1%  Not utd with eye dr      Reports chronic rhinorrhea.     Reports 11 lb unexplained weight loss over the past 4 months.  No change in appetite. Hx of breast cancer in past.  Sees oncology.     htn and cad stable.       Review of Systems      Review of Systems   Constitutional: Negative for fever and chills.   HENT: Negative for hearing loss and neck stiffness.    Eyes: Negative for redness and itching.   Respiratory: Negative for cough and choking.    Cardiovascular: Negative for chest pain and leg swelling.  Abdomen: Negative for abdominal pain and blood in stool.   Genitourinary: Negative for dysuria and flank pain.   Musculoskeletal: Negative for back pain and gait problem.   Neurological: Negative for light-headedness and headaches.   Hematological: Negative for adenopathy.   Psychiatric/Behavioral: Negative for behavioral problems.     Objective:      Physical Exam   Constitutional: She appears well-developed.   HENT:   Head: Normocephalic and atraumatic.   Eyes: Conjunctivae are normal. Pupils are equal, round, and reactive to light.   Neck: Normal range of motion.   Cardiovascular: Normal rate and regular rhythm.    No murmur heard.  Pulmonary/Chest: Effort normal and breath sounds normal. She has no wheezes.   Lymphadenopathy:     She has no cervical adenopathy.       Assessment:       1. Diabetes mellitus due to underlying condition with hyperosmolarity without coma, without long-term current use of insulin    2. Menopause    3. Weight loss, non-intentional    4. Essential hypertension    5. Coronary artery disease involving native coronary artery of native heart without angina pectoris        Plan:       Diabetes mellitus due to underlying condition with hyperosmolarity without coma, without long-term current use of  insulin  -     Ambulatory referral to Optometry  -     Hemoglobin A1c; Future; Expected date: 8/1/17  -     Lipid panel; Future; Expected date: 8/3/17  -     Comprehensive metabolic panel; Future; Expected date: 8/3/17  -     Microalbumin/creatinine urine ratio; Future; Expected date: 8/3/17    Menopause  -     DXA Bone Density Spine And Hip; Future; Expected date: 5/3/17    Weight loss, non-intentional    Essential hypertension    Coronary artery disease involving native coronary artery of native heart without angina pectoris    Other orders  -     azelastine (ASTELIN) 137 mcg (0.1 %) nasal spray; 1 spray (137 mcg total) by Nasal route 2 (two) times daily.  Dispense: 30 mL; Refill: 11  -     Pneumococcal Conjugate Vaccine (13 Valent) (IM)      Plan:  Start astelin for chronic rhinorrhea  See orders  F/u with oncology in re: unexplained weight loss            Medication List with Changes/Refills   New Medications    AZELASTINE (ASTELIN) 137 MCG (0.1 %) NASAL SPRAY    1 spray (137 mcg total) by Nasal route 2 (two) times daily.   Current Medications    ALBUTEROL (PROVENTIL HFA) 90 MCG/ACTUATION INHALER    Inhale 2 puffs into the lungs every 6 (six) hours as needed.    ALENDRONATE (FOSAMAX) 70 MG TABLET    TAKE ONE TABLET BY MOUTH ONCE DAILY EVERY 7 days    AMLODIPINE (NORVASC) 10 MG TABLET    TAKE ONE TABLET BY MOUTH ONCE DAILY    ASPIRIN (ECOTRIN) 81 MG EC TABLET    Take 81 mg by mouth once daily.      ATORVASTATIN (LIPITOR) 40 MG TABLET    TAKE ONE TABLET BY MOUTH ONCE DAILY    BLOOD SUGAR DIAGNOSTIC STRP    1 strip by Misc.(Non-Drug; Combo Route) route 2 (two) times daily.    BLOOD-GLUCOSE METER KIT    Use as instructed    CITALOPRAM (CELEXA) 20 MG TABLET    TAKE ONE TABLET BY MOUTH EVERY DAY    CLOPIDOGREL (PLAVIX) 75 MG TABLET    TAKE ONE TABLET BY MOUTH ONCE DAILY    CYANOCOBALAMIN 1,000 MCG/ML INJECTION    Inject 1 mL (1,000 mcg total) into the skin every 14 (fourteen) days.    CYANOCOBALAMIN, VITAMIN B-12,  (VITAMIN B-12) 2,500 MCG SUBL    Place 2,500 mcg under the tongue 4 (four) times daily.    FAMOTIDINE (PEPCID) 40 MG TABLET    TAKE ONE TABLET BY MOUTH ONCE DAILY    FENOFIBRATE 160 MG TAB    TAKE ONE TABLET BY MOUTH ONCE DAILY    FLUOCINONIDE (LIDEX) 0.05 % EXTERNAL SOLUTION    APPLY TO SCALP twice daily FOR TWO WEEKS    FLUTICASONE (FLONASE) 50 MCG/ACTUATION NASAL SPRAY    2 sprays by Each Nare route once daily.    GABAPENTIN (NEURONTIN) 300 MG CAPSULE    TAKE ONE CAPSULE BY MOUTH TWICE DAILY    HYDROCORTISONE 2.5 % CREAM    1 application 2 (two) times daily. Apply to affected area    KETOCONAZOLE (NIZORAL) 2 % CREAM    APPLY TO THE AFFECTED AREA twice daily use with desonide    KETOCONAZOLE (NIZORAL) 2 % SHAMPOO    Apply to scalp at least once per week. Let sit for TEN minutes before rinsing]    LANCETS MISC    1 Units by Misc.(Non-Drug; Combo Route) route 3 (three) times daily.    LORAZEPAM (ATIVAN) 1 MG TABLET    TAKE ONE TABLET BY MOUTH EVERY 8 HOURS AS NEEDED    METFORMIN (GLUCOPHAGE) 500 MG TABLET    TAKE ONE TABLET BY MOUTH TWICE DAILY WITH FOOD    METOPROLOL SUCCINATE (TOPROL-XL) 25 MG 24 HR TABLET    TAKE ONE TABLET BY MOUTH ONCE DAILY    QUETIAPINE (SEROQUEL) 100 MG TAB    TAKE ONE TABLET BY MOUTH ONCE DAILY IN THE EVENING    SYMBICORT 160-4.5 MCG/ACTUATION HFAA    inhale TWO puffs BY MOUTH and into lungs TWICE DAILY    TRIAMCINOLONE ACETONIDE 0.1% (KENALOG) 0.1 % OINTMENT    APPLY TO THE AFFECTED AREA twice daily. AVOID FACE, AXILLA, AND GROIN   Discontinued Medications    LANCETS MISC    1 Units by Misc.(Non-Drug; Combo Route) route 2 (two) times daily.    MECLIZINE (ANTIVERT) 25 MG TABLET    take 1 Tablet  by mouth three times a day AS NEEDED    MONTELUKAST (SINGULAIR) 10 MG TABLET    Take 1 tablet (10 mg total) by mouth every evening.

## 2017-05-03 NOTE — MR AVS SNAPSHOT
[facility]  [address] [facility] [address]  [place] [address]  Phone: [phone]  Fax: [phone]                  Amber Sharp   5/3/2017 11:40 AM   Office Visit    Description:  Female : 1950   Provider:  Mike King MD   Department:  [facility]           Reason for Visit     Diabetes           Diagnoses this Visit        Comments    Diabetes mellitus due to underlying condition with hyperosmolarity without coma, without long-term current use of insulin    -  Primary     Menopause                To Do List           Future Appointments        Provider Department Dept Phone    2017 10:40 AM Alina Brothers MD [facility] - Hematology [phone]    2017 11:00 AM DC DEXA1 [facility] [phone]    8/3/2017 10:00 AM LAB, [place] [facility] [phone]    8/3/2017 10:15 AM LAB, [place] [facility] [phone]    8/10/2017 11:20 AM Mike King MD [facility] [phone]      Goals (5 Years of Data)     None      Follow-Up and Disposition     Return in about 3 months (around 8/3/2017).       These Medications        Disp Refills Start End    azelastine (ASTELIN) 137 mcg (0.1 %) nasal spray 30 mL 11 5/3/2017     1 spray (137 mcg total) by Nasal route 2 (two) times daily. - Nasal    Pharmacy: [facility] - [place] - [address] Ph #: [phone]         [facility]     [facility] Nurse Care Line -  Assistance  Unless otherwise directed by your provider, please contact [facility], our nurse care line that is available for  assistance.     Registered nurses in the [facility] provide: appointment scheduling, clinical advisement, health education, and other advisory services.  Call: [phone] (toll free)               Medications           START taking these NEW medications        Refills    azelastine  (ASTELIN) 137 mcg (0.1 %) nasal spray 11    Si spray (137 mcg total) by Nasal route 2 (two) times daily.    Class: Normal    Route: Nasal      STOP taking these medications     montelukast (SINGULAIR) 10 mg tablet Take 1 tablet (10 mg total) by mouth every evening.    meclizine (ANTIVERT) 25 mg tablet take 1 Tablet  by mouth three times a day AS NEEDED           Verify that the below list of medications is an accurate representation of the medications you are currently taking.  If none reported, the list may be blank. If incorrect, please contact your healthcare provider. Carry this list with you in case of emergency.           Current Medications     albuterol (PROVENTIL HFA) 90 mcg/actuation inhaler Inhale 2 puffs into the lungs every 6 (six) hours as needed.    alendronate (FOSAMAX) 70 MG tablet TAKE ONE TABLET BY MOUTH ONCE DAILY EVERY 7 days    amlodipine (NORVASC) 10 MG tablet TAKE ONE TABLET BY MOUTH ONCE DAILY    aspirin (ECOTRIN) 81 MG EC tablet Take 81 mg by mouth once daily.      atorvastatin (LIPITOR) 40 MG tablet TAKE ONE TABLET BY MOUTH ONCE DAILY    blood sugar diagnostic Strp 1 strip by Misc.(Non-Drug; Combo Route) route 2 (two) times daily.    blood-glucose meter kit Use as instructed    citalopram (CELEXA) 20 MG tablet TAKE ONE TABLET BY MOUTH EVERY DAY    clopidogrel (PLAVIX) 75 mg tablet TAKE ONE TABLET BY MOUTH ONCE DAILY    cyanocobalamin 1,000 mcg/mL injection Inject 1 mL (1,000 mcg total) into the skin every 14 (fourteen) days.    cyanocobalamin, vitamin B-12, (VITAMIN B-12) 2,500 mcg Subl Place 2,500 mcg under the tongue 4 (four) times daily.    famotidine (PEPCID) 40 MG tablet TAKE ONE TABLET BY MOUTH ONCE DAILY    fenofibrate 160 MG Tab TAKE ONE TABLET BY MOUTH ONCE DAILY    fluocinonide (LIDEX) 0.05 % external solution APPLY TO SCALP twice daily FOR TWO WEEKS    fluticasone (FLONASE) 50 mcg/actuation nasal spray 2 sprays by Each Nare route once daily.    gabapentin (NEURONTIN) 300 MG  "capsule TAKE ONE CAPSULE BY MOUTH TWICE DAILY    hydrocortisone 2.5 % cream 1 application 2 (two) times daily. Apply to affected area    ketoconazole (NIZORAL) 2 % cream APPLY TO THE AFFECTED AREA twice daily use with desonide    ketoconazole (NIZORAL) 2 % shampoo Apply to scalp at least once per week. Let sit for TEN minutes before rinsing]    lancets Misc 1 Units by Misc.(Non-Drug; Combo Route) route 3 (three) times daily.    lorazepam (ATIVAN) 1 MG tablet TAKE ONE TABLET BY MOUTH EVERY 8 HOURS AS NEEDED    metformin (GLUCOPHAGE) 500 MG tablet TAKE ONE TABLET BY MOUTH TWICE DAILY WITH FOOD    metoprolol succinate (TOPROL-XL) 25 MG 24 hr tablet TAKE ONE TABLET BY MOUTH ONCE DAILY    quetiapine (SEROQUEL) 100 MG Tab TAKE ONE TABLET BY MOUTH ONCE DAILY IN THE EVENING    SYMBICORT 160-4.5 mcg/actuation HFAA inhale TWO puffs BY MOUTH and into lungs TWICE DAILY    triamcinolone acetonide 0.1% (KENALOG) 0.1 % ointment APPLY TO THE AFFECTED AREA twice daily. AVOID FACE, AXILLA, AND GROIN    azelastine (ASTELIN) 137 mcg (0.1 %) nasal spray 1 spray (137 mcg total) by Nasal route 2 (two) times daily.           Clinical Reference Information           Your Vitals Were     BP Pulse Height Weight SpO2 BMI    104/64 89 5' 5" (1.651 m) 66.7 kg (147 lb 0.8 oz) 98% 24.47 kg/m2      Blood Pressure          Most Recent Value    BP  104/64      Allergies as of 5/3/2017     Adhesive    Carvedilol    Lisinopril    Losartan      Immunizations Administered on Date of Encounter - 5/3/2017     Name Date Dose VIS Date Route    Pneumococcal Conjugate - 13 Valent 5/3/2017 0.5 mL 11/5/2015 Intramuscular      Orders Placed During Today's Visit      Normal Orders This Visit    Ambulatory referral to Optometry     Pneumococcal Conjugate Vaccine (13 Valent) (IM)     Future Labs/Procedures Expected by Expires    DXA Bone Density Spine And Hip  5/3/2017 8/1/2017    Hemoglobin A1c  8/1/2017 5/3/2018    Comprehensive metabolic panel  8/3/2017 5/3/2018 "    Lipid panel  8/3/2017 5/4/2018    Microalbumin/creatinine urine ratio  8/3/2017 5/3/2018      Language Assistance Services     ATTENTION: Language assistance services are available, free of charge. Please call [phone].      ATENCIÓN: Si habla dinora, tiene a wharton disposición servicios gratuitos de asistencia lingüística. Llame al [phone].     CHÚ Ý: N?u b?n nói Ti?ng Vi?t, có các d?ch v? h? tr? ngôn ng? mi?n phí dành cho b?n. G?i s? [phone].         [facility] complies with applicable Federal civil rights laws and does not discriminate on the basis of race, color, national origin, age, disability, or sex.

## 2017-05-05 ENCOUNTER — TELEPHONE (OUTPATIENT)
Dept: FAMILY MEDICINE | Facility: CLINIC | Age: 67
End: 2017-05-05

## 2017-05-05 NOTE — TELEPHONE ENCOUNTER
----- Message from Vanessa Crain sent at 5/5/2017  8:36 AM CDT -----  Contact: Sana lindsay/ [employer] [phone] ext [phone]  Sana wants to speak with a nurse regarding a pre-auth that was faxed over. Please call back at [phone] ext [phone]

## 2017-05-06 LAB — CANCER AG27-29 SERPL-ACNC: 24.9 U/ML

## 2017-05-09 ENCOUNTER — TELEPHONE (OUTPATIENT)
Dept: FAMILY MEDICINE | Facility: CLINIC | Age: 67
End: 2017-05-09

## 2017-05-11 ENCOUNTER — TELEPHONE (OUTPATIENT)
Dept: FAMILY MEDICINE | Facility: CLINIC | Age: 67
End: 2017-05-11

## 2017-05-11 NOTE — TELEPHONE ENCOUNTER
----- Message from Vianey Varghese sent at 5/11/2017  9:59 AM CDT -----  Contact: patient  Patient calling in regards to her getting her pneumonia vaccination last week. This past Tuesday the area was red,swollen and painful to the touch. Today it is even worse. She wants to speak with a Nurse to find out if this is normal or does she need to come in. Please advise.  Call back .  Thanks!

## 2017-05-12 ENCOUNTER — OFFICE VISIT (OUTPATIENT)
Dept: FAMILY MEDICINE | Facility: CLINIC | Age: 67
End: 2017-05-12
Payer: MEDICARE

## 2017-05-12 ENCOUNTER — OFFICE VISIT (OUTPATIENT)
Dept: HEMATOLOGY/ONCOLOGY | Facility: CLINIC | Age: 67
End: 2017-05-12
Payer: MEDICARE

## 2017-05-12 VITALS
HEIGHT: 65 IN | WEIGHT: 146.63 LBS | RESPIRATION RATE: 18 BRPM | SYSTOLIC BLOOD PRESSURE: 118 MMHG | DIASTOLIC BLOOD PRESSURE: 57 MMHG | HEART RATE: 67 BPM | TEMPERATURE: 99 F | BODY MASS INDEX: 24.43 KG/M2

## 2017-05-12 VITALS
BODY MASS INDEX: 24.43 KG/M2 | HEART RATE: 71 BPM | OXYGEN SATURATION: 98 % | HEIGHT: 65 IN | TEMPERATURE: 98 F | DIASTOLIC BLOOD PRESSURE: 78 MMHG | SYSTOLIC BLOOD PRESSURE: 124 MMHG | WEIGHT: 146.63 LBS

## 2017-05-12 DIAGNOSIS — C50.011 BILATERAL MALIGNANT NEOPLASM OF NIPPLE IN FEMALE: ICD-10-CM

## 2017-05-12 DIAGNOSIS — R52 PAIN AT INJECTION SITE, INITIAL ENCOUNTER: ICD-10-CM

## 2017-05-12 DIAGNOSIS — I25.10 CORONARY ARTERY DISEASE INVOLVING NATIVE CORONARY ARTERY OF NATIVE HEART WITHOUT ANGINA PECTORIS: ICD-10-CM

## 2017-05-12 DIAGNOSIS — L03.113 CELLULITIS OF RIGHT UPPER ARM: Primary | ICD-10-CM

## 2017-05-12 DIAGNOSIS — E53.8 B12 DEFICIENCY: Primary | ICD-10-CM

## 2017-05-12 DIAGNOSIS — C50.012 BILATERAL MALIGNANT NEOPLASM OF NIPPLE IN FEMALE: ICD-10-CM

## 2017-05-12 DIAGNOSIS — E78.5 DYSLIPIDEMIA: ICD-10-CM

## 2017-05-12 DIAGNOSIS — L53.9 ERYTHEMA AT INJECTION SITE: ICD-10-CM

## 2017-05-12 DIAGNOSIS — Z95.1 S/P CABG (CORONARY ARTERY BYPASS GRAFT): ICD-10-CM

## 2017-05-12 DIAGNOSIS — R22.9 SWELLING AT INJECTION SITE: ICD-10-CM

## 2017-05-12 DIAGNOSIS — T80.89XA PAIN AT INJECTION SITE, INITIAL ENCOUNTER: ICD-10-CM

## 2017-05-12 PROCEDURE — 1160F RVW MEDS BY RX/DR IN RCRD: CPT | Mod: S$GLB,,, | Performed by: INTERNAL MEDICINE

## 2017-05-12 PROCEDURE — 99999 PR PBB SHADOW E&M-EST. PATIENT-LVL III: CPT | Mod: PBBFAC,,, | Performed by: INTERNAL MEDICINE

## 2017-05-12 PROCEDURE — 3074F SYST BP LT 130 MM HG: CPT | Mod: S$GLB,,, | Performed by: INTERNAL MEDICINE

## 2017-05-12 PROCEDURE — 3074F SYST BP LT 130 MM HG: CPT | Mod: S$GLB,,, | Performed by: NURSE PRACTITIONER

## 2017-05-12 PROCEDURE — 99499 UNLISTED E&M SERVICE: CPT | Mod: S$PBB,,, | Performed by: INTERNAL MEDICINE

## 2017-05-12 PROCEDURE — 99214 OFFICE O/P EST MOD 30 MIN: CPT | Mod: S$GLB,,, | Performed by: INTERNAL MEDICINE

## 2017-05-12 PROCEDURE — 1160F RVW MEDS BY RX/DR IN RCRD: CPT | Mod: S$GLB,,, | Performed by: NURSE PRACTITIONER

## 2017-05-12 PROCEDURE — 3078F DIAST BP <80 MM HG: CPT | Mod: S$GLB,,, | Performed by: NURSE PRACTITIONER

## 2017-05-12 PROCEDURE — 99999 PR PBB SHADOW E&M-EST. PATIENT-LVL IV: CPT | Mod: PBBFAC,,, | Performed by: NURSE PRACTITIONER

## 2017-05-12 PROCEDURE — 3078F DIAST BP <80 MM HG: CPT | Mod: S$GLB,,, | Performed by: INTERNAL MEDICINE

## 2017-05-12 PROCEDURE — 99213 OFFICE O/P EST LOW 20 MIN: CPT | Mod: S$GLB,,, | Performed by: NURSE PRACTITIONER

## 2017-05-12 RX ORDER — CLINDAMYCIN HYDROCHLORIDE 300 MG/1
300 CAPSULE ORAL 3 TIMES DAILY
Qty: 21 CAPSULE | Refills: 0 | Status: SHIPPED | OUTPATIENT
Start: 2017-05-12 | End: 2017-05-12 | Stop reason: ALTCHOICE

## 2017-05-12 RX ORDER — PREDNISONE 10 MG/1
TABLET ORAL
Qty: 12 TABLET | Refills: 0 | Status: SHIPPED | OUTPATIENT
Start: 2017-05-12 | End: 2017-07-20

## 2017-05-12 RX ORDER — FLUCONAZOLE 150 MG/1
150 TABLET ORAL DAILY
Qty: 1 TABLET | Refills: 0 | Status: SHIPPED | OUTPATIENT
Start: 2017-05-12 | End: 2017-05-13

## 2017-05-12 NOTE — PROGRESS NOTES
Subjective:       Patient ID: Amber Sharp is a 66 y.o. female.    This patient is new to me.    She presents today with complaints of redness, swelling, and pain at pneumococcal injection site (vaccine received 05/03/17). She reports the symptoms presented 2 days after vaccine administration and have not resolved.  She denies drainage from site, fever, chills. She has not tried any treatment at home. Exacerbating factors include movement and palpation.     Chief Complaint: Arm Pain (R arm)    HPI  Vitals:    05/12/17 0932   BP: 124/78   Pulse: 71   Temp: 98 °F (36.7 °C)     Review of Systems   Constitutional: Negative.  Negative for chills, fatigue and fever.   HENT: Negative.  Negative for facial swelling and trouble swallowing.    Respiratory: Negative.  Negative for chest tightness, shortness of breath and wheezing.    Cardiovascular: Negative.  Negative for chest pain and palpitations.   Gastrointestinal: Negative.  Negative for abdominal pain, constipation, diarrhea, nausea and vomiting.   Skin: Positive for color change.        +erythema, swelling and tenderness to right upper arm   Neurological: Negative.  Negative for dizziness and light-headedness.   Psychiatric/Behavioral: Negative.  Negative for hallucinations. The patient is not nervous/anxious.        Past Medical History:   Diagnosis Date    Anticoagulant long-term use     Plavix & Aspirin-stopped 06/18/13    Anxiety     Aortic aneurysm     Asthma     Bipolar disorder     Blood transfusion     CAD (coronary artery disease)     Cancer     breast CA    COPD (chronic obstructive pulmonary disease)     Diabetes     History of breast cancer     Hyperlipidemia     Lumbago     VAUGHN (obstructive sleep apnea) 2009    uses C-PAP    PAD (peripheral artery disease)     Poor intravenous access     Chemotherapy. Pt did not have a Port, use left arm     Objective:      Physical Exam   Constitutional: She is oriented to person, place, and time. Vital  signs are normal. She appears well-developed and well-nourished.  Non-toxic appearance. She does not have a sickly appearance. She does not appear ill. No distress. She is not intubated.   HENT:   Head: Normocephalic and atraumatic.   Nose: Nose normal.   Eyes: EOM are normal. Pupils are equal, round, and reactive to light.   Neck: Normal range of motion. Neck supple.   Cardiovascular: Normal rate, regular rhythm, S1 normal, S2 normal and normal heart sounds.   No extrasystoles are present. PMI is not displaced.  Exam reveals no gallop, no S3, no S4, no distant heart sounds and no friction rub.    No murmur heard.  Pulmonary/Chest: Effort normal and breath sounds normal. No accessory muscle usage. No apnea, no tachypnea and no bradypnea. She is not intubated. No respiratory distress. She has no decreased breath sounds. She has no wheezes. She has no rhonchi. She has no rales.   Musculoskeletal: Normal range of motion.   Neurological: She is alert and oriented to person, place, and time.   Skin: Skin is warm, dry and intact. There is erythema.        Patient reports area of swelling and erythema has remained unchanged and has not grown since initial observation. She denies pruritus   Psychiatric: She has a normal mood and affect. Her speech is normal and behavior is normal. Judgment and thought content normal. Cognition and memory are normal.   Nursing note and vitals reviewed.      Assessment:       1. Cellulitis of right upper arm    2. Swelling at injection site    3. Pain at injection site, initial encounter    4. Erythema at injection site        Plan:       Cellulitis of right upper arm  -     clindamycin (CLEOCIN) 300 MG capsule; Take 1 capsule (300 mg total) by mouth 3 (three) times daily.  Dispense: 21 capsule; Refill: 0    Swelling at injection site  -     predniSONE (DELTASONE) 10 MG tablet; 3 tabs daily for 2 days, 2 tabs daily for 2 days then 1 tab daily for 2 days.  Dispense: 12 tablet; Refill:  0    Pain at injection site, initial encounter  Erythema at injection site      Ice to site, on 20 minutes off 40 minutes.   otc tylenol prn pain    FU if symptoms worsen or persist

## 2017-05-12 NOTE — TELEPHONE ENCOUNTER
----- Message from Wade Rodriguez sent at 5/12/2017 12:55 PM CDT -----  Contact: self   Patient want to know if you can call her something in to prevent catching a yeast infection from the antibiotic you gave her this morning please sent to [facility], any questions please call back at [phone]    [facility] [place] - [address]  [address]  [place] [address]  Phone: [phone] Fax: [phone]

## 2017-05-19 ENCOUNTER — HOSPITAL ENCOUNTER (OUTPATIENT)
Dept: RADIOLOGY | Facility: HOSPITAL | Age: 67
Discharge: HOME | End: 2017-05-19
Attending: FAMILY MEDICINE
Payer: MEDICARE

## 2017-05-19 DIAGNOSIS — Z78.0 MENOPAUSE: ICD-10-CM

## 2017-05-19 PROCEDURE — 77080 DXA BONE DENSITY AXIAL: CPT | Mod: 26,,, | Performed by: RADIOLOGY

## 2017-05-19 PROCEDURE — 77080 DXA BONE DENSITY AXIAL: CPT | Mod: TC,PO

## 2017-05-21 ENCOUNTER — PATIENT MESSAGE (OUTPATIENT)
Dept: FAMILY MEDICINE | Facility: CLINIC | Age: 67
End: 2017-05-21

## 2017-05-31 RX ORDER — GABAPENTIN 300 MG/1
CAPSULE ORAL
Qty: 60 CAPSULE | Refills: 11 | Status: SHIPPED | OUTPATIENT
Start: 2017-05-31 | End: 2017-11-17 | Stop reason: SDUPTHER

## 2017-05-31 RX ORDER — CLOPIDOGREL BISULFATE 75 MG/1
TABLET ORAL
Qty: 30 TABLET | Refills: 11 | Status: ON HOLD | OUTPATIENT
Start: 2017-05-31 | End: 2018-01-08

## 2017-05-31 RX ORDER — METFORMIN HYDROCHLORIDE 500 MG/1
TABLET ORAL
Qty: 60 TABLET | Refills: 11 | Status: SHIPPED | OUTPATIENT
Start: 2017-05-31 | End: 2017-10-20

## 2017-05-31 RX ORDER — CITALOPRAM 20 MG/1
TABLET, FILM COATED ORAL
Qty: 30 TABLET | Refills: 11 | Status: ON HOLD | OUTPATIENT
Start: 2017-05-31 | End: 2018-01-08

## 2017-06-29 ENCOUNTER — OFFICE VISIT (OUTPATIENT)
Dept: CARDIOLOGY | Facility: CLINIC | Age: 67
End: 2017-06-29
Payer: MEDICARE

## 2017-06-29 VITALS
HEIGHT: 65 IN | WEIGHT: 138.88 LBS | HEART RATE: 75 BPM | BODY MASS INDEX: 23.14 KG/M2 | DIASTOLIC BLOOD PRESSURE: 59 MMHG | SYSTOLIC BLOOD PRESSURE: 97 MMHG

## 2017-06-29 DIAGNOSIS — Z95.1 S/P CABG (CORONARY ARTERY BYPASS GRAFT): ICD-10-CM

## 2017-06-29 DIAGNOSIS — I25.10 CORONARY ARTERY DISEASE INVOLVING NATIVE CORONARY ARTERY OF NATIVE HEART WITHOUT ANGINA PECTORIS: Primary | ICD-10-CM

## 2017-06-29 DIAGNOSIS — R06.02 SOB (SHORTNESS OF BREATH): ICD-10-CM

## 2017-06-29 DIAGNOSIS — R07.9 CHEST PAIN, UNSPECIFIED TYPE: Primary | ICD-10-CM

## 2017-06-29 DIAGNOSIS — R06.02 SOB (SHORTNESS OF BREATH): Primary | ICD-10-CM

## 2017-06-29 DIAGNOSIS — R06.02 SOB (SHORTNESS OF BREATH) ON EXERTION: ICD-10-CM

## 2017-06-29 DIAGNOSIS — I10 ESSENTIAL HYPERTENSION: ICD-10-CM

## 2017-06-29 PROCEDURE — 99499 UNLISTED E&M SERVICE: CPT | Mod: S$PBB,,, | Performed by: INTERNAL MEDICINE

## 2017-06-29 PROCEDURE — 99214 OFFICE O/P EST MOD 30 MIN: CPT | Mod: S$GLB,,, | Performed by: INTERNAL MEDICINE

## 2017-06-29 PROCEDURE — 99999 PR PBB SHADOW E&M-EST. PATIENT-LVL III: CPT | Mod: PBBFAC,,, | Performed by: INTERNAL MEDICINE

## 2017-06-29 PROCEDURE — 93000 ELECTROCARDIOGRAM COMPLETE: CPT | Mod: S$GLB,,, | Performed by: INTERNAL MEDICINE

## 2017-06-29 RX ORDER — MOMETASONE FUROATE 1 MG/G
CREAM TOPICAL
Refills: 5 | COMMUNITY
Start: 2017-05-30

## 2017-06-29 RX ORDER — TRIAMCINOLONE ACETONIDE 1 MG/G
OINTMENT TOPICAL
COMMUNITY
Start: 2017-05-30

## 2017-06-29 RX ORDER — HYDROCORTISONE 25 MG/G
CREAM TOPICAL
COMMUNITY
Start: 2017-05-30

## 2017-06-29 NOTE — PROGRESS NOTES
Subjective:    Patient ID:  Amber Sharp is a 66 y.o. female who presents for evaluation of established pt (established pt); Fatigue; and Weight Loss      HPI66 yo WF with hx of CABG in 1994 who had chest pain three nights in a row a month ago and now comes to the office to see if something happened because she has no energy and fatigued. Also complains of weight loss. She states she has been told her heart is wearing out and nothing can be done despite having negative stress echo in Sept of 2016 with normal EF.    Review of Systems   Constitution: Positive for weakness, malaise/fatigue and weight loss. Negative for decreased appetite, fever and weight gain.   HENT: Negative for headaches, hearing loss and nosebleeds.    Eyes: Negative for visual disturbance.   Cardiovascular: Negative for chest pain, claudication, cyanosis, dyspnea on exertion, irregular heartbeat, leg swelling, near-syncope, orthopnea, palpitations, paroxysmal nocturnal dyspnea and syncope.   Respiratory: Positive for shortness of breath. Negative for cough, hemoptysis, sleep disturbances due to breathing, snoring and wheezing.    Endocrine: Negative for cold intolerance, heat intolerance, polydipsia and polyuria.   Hematologic/Lymphatic: Negative for adenopathy and bleeding problem. Does not bruise/bleed easily.   Skin: Negative for color change, itching, poor wound healing, rash and suspicious lesions.   Musculoskeletal: Negative for arthritis, back pain, falls, joint pain, joint swelling, muscle cramps, muscle weakness and myalgias.   Gastrointestinal: Negative for bloating, abdominal pain, change in bowel habit, constipation, flatus, heartburn, hematemesis, hematochezia, hemorrhoids, jaundice, melena, nausea and vomiting.   Genitourinary: Negative for bladder incontinence, decreased libido, frequency, hematuria, hesitancy and urgency.   Neurological: Positive for loss of balance. Negative for brief paralysis, difficulty with concentration,  "excessive daytime sleepiness, dizziness, focal weakness, light-headedness, numbness and vertigo.   Psychiatric/Behavioral: Negative for altered mental status, depression and memory loss. The patient does not have insomnia and is not nervous/anxious.    Allergic/Immunologic: Negative for environmental allergies, hives and persistent infections.        Objective:    Physical Exam   Constitutional: She is oriented to person, place, and time. She appears well-developed and well-nourished.   BP (!) 97/59   Pulse 75   Ht 5' 5" (1.651 m)   Wt 63 kg (138 lb 14.2 oz)   BMI 23.11 kg/m²      HENT:   Head: Normocephalic and atraumatic.   Right Ear: External ear normal.   Left Ear: External ear normal.   Nose: Nose normal.   Mouth/Throat: Oropharynx is clear and moist.   Eyes: Conjunctivae, EOM and lids are normal. Pupils are equal, round, and reactive to light. Right eye exhibits no discharge. Left eye exhibits no discharge. Right conjunctiva has no hemorrhage. No scleral icterus.   Neck: Normal range of motion. Neck supple. No JVD present. No tracheal deviation present. No thyromegaly present.   Cardiovascular: Normal rate, regular rhythm, normal heart sounds and intact distal pulses.  Exam reveals no gallop and no friction rub.    No murmur heard.  Pulmonary/Chest: Effort normal and breath sounds normal. No respiratory distress. She has no wheezes. She has no rales. She exhibits no tenderness. Breasts are symmetrical.   Sternal scar   Abdominal: Soft. Bowel sounds are normal. She exhibits no distension and no mass. There is no hepatosplenomegaly or hepatomegaly. There is no tenderness. There is no rebound and no guarding.   Musculoskeletal: Normal range of motion. She exhibits no edema or tenderness.   Lymphadenopathy:     She has no cervical adenopathy.   Neurological: She is alert and oriented to person, place, and time. She displays normal reflexes. No cranial nerve deficit. Coordination normal.   Skin: Skin is warm " and dry. No rash noted. No erythema. No pallor.   Psychiatric: She has a normal mood and affect. Her behavior is normal. Judgment and thought content normal.   Nursing note and vitals reviewed.        Assessment:       1. Coronary artery disease involving native coronary artery of native heart without angina pectoris    2. S/P CABG (coronary artery bypass graft) 1994, x 3    3. SOB (shortness of breath) on exertion    4. Essential hypertension         Plan:      Patient has generalized fatigue.     Will do echo and nuclear stress test and check CBC     EKG today.        Orders Placed This Encounter   Procedures    CBC auto differential    EKG 12-lead    2D echo with color flow doppler     Return in about 6 months (around 12/29/2017).

## 2017-07-11 ENCOUNTER — LAB VISIT (OUTPATIENT)
Dept: LAB | Facility: HOSPITAL | Age: 67
End: 2017-07-11
Attending: INTERNAL MEDICINE
Payer: MEDICARE

## 2017-07-11 DIAGNOSIS — Z95.1 S/P CABG (CORONARY ARTERY BYPASS GRAFT): ICD-10-CM

## 2017-07-11 DIAGNOSIS — I25.10 CORONARY ARTERY DISEASE INVOLVING NATIVE CORONARY ARTERY OF NATIVE HEART WITHOUT ANGINA PECTORIS: ICD-10-CM

## 2017-07-11 LAB
BASOPHILS # BLD AUTO: 0.01 K/UL
BASOPHILS NFR BLD: 0.1 %
DIFFERENTIAL METHOD: ABNORMAL
EOSINOPHIL # BLD AUTO: 0.2 K/UL
EOSINOPHIL NFR BLD: 2.2 %
ERYTHROCYTE [DISTWIDTH] IN BLOOD BY AUTOMATED COUNT: 17.6 %
HCT VFR BLD AUTO: 30.6 %
HGB BLD-MCNC: 9.7 G/DL
LYMPHOCYTES # BLD AUTO: 2.5 K/UL
LYMPHOCYTES NFR BLD: 34.2 %
MCH RBC QN AUTO: 26.8 PG
MCHC RBC AUTO-ENTMCNC: 31.7 %
MCV RBC AUTO: 85 FL
MONOCYTES # BLD AUTO: 0.7 K/UL
MONOCYTES NFR BLD: 9 %
NEUTROPHILS # BLD AUTO: 4 K/UL
NEUTROPHILS NFR BLD: 54.2 %
PLATELET # BLD AUTO: 549 K/UL
PMV BLD AUTO: 9.7 FL
RBC # BLD AUTO: 3.62 M/UL
WBC # BLD AUTO: 7.43 K/UL

## 2017-07-11 PROCEDURE — 85025 COMPLETE CBC W/AUTO DIFF WBC: CPT

## 2017-07-11 PROCEDURE — 36415 COLL VENOUS BLD VENIPUNCTURE: CPT | Mod: PO

## 2017-07-12 ENCOUNTER — TELEPHONE (OUTPATIENT)
Dept: FAMILY MEDICINE | Facility: CLINIC | Age: 67
End: 2017-07-12

## 2017-07-12 ENCOUNTER — TELEPHONE (OUTPATIENT)
Dept: CARDIOLOGY | Facility: CLINIC | Age: 67
End: 2017-07-12

## 2017-07-12 NOTE — TELEPHONE ENCOUNTER
Spoke with pt to let her know about her abnormal labs, per Dr Hopkins she needs to follow up with her PCP for that. She verbally understood and will contact them.

## 2017-07-12 NOTE — TELEPHONE ENCOUNTER
----- Message from Chaka Cuellar sent at 7/12/2017 11:27 AM CDT -----  Contact: Amber  Patient states she missed a call, but got no message. Please call back [phone] (home)   Thanks!

## 2017-07-13 ENCOUNTER — CLINICAL SUPPORT (OUTPATIENT)
Dept: CARDIOLOGY | Facility: CLINIC | Age: 67
End: 2017-07-13
Payer: MEDICARE

## 2017-07-13 DIAGNOSIS — Z95.1 S/P CABG (CORONARY ARTERY BYPASS GRAFT): ICD-10-CM

## 2017-07-13 DIAGNOSIS — I25.10 CORONARY ARTERY DISEASE INVOLVING NATIVE CORONARY ARTERY OF NATIVE HEART WITHOUT ANGINA PECTORIS: ICD-10-CM

## 2017-07-13 PROCEDURE — 93306 TTE W/DOPPLER COMPLETE: CPT | Mod: S$GLB,,, | Performed by: INTERNAL MEDICINE

## 2017-07-14 ENCOUNTER — TELEPHONE (OUTPATIENT)
Dept: CARDIOLOGY | Facility: CLINIC | Age: 67
End: 2017-07-14

## 2017-07-14 ENCOUNTER — TELEPHONE (OUTPATIENT)
Dept: FAMILY MEDICINE | Facility: CLINIC | Age: 67
End: 2017-07-14

## 2017-07-14 NOTE — TELEPHONE ENCOUNTER
----- Message from Chaka Cuellar sent at 7/14/2017 10:32 AM CDT -----  Contact: Amber Clark was in yesterday for an echo. She said the office was supposed to get back with her yesterday or this morning. She did not want to go into the weekend and not know what is going on. Please call [phone] (home)   Thanks!

## 2017-07-14 NOTE — TELEPHONE ENCOUNTER
----- Message from Pratibha Camacho sent at 7/14/2017  9:48 AM CDT -----  Contact: elver powell  Test results   Call back

## 2017-07-17 ENCOUNTER — TELEPHONE (OUTPATIENT)
Dept: CARDIOLOGY | Facility: CLINIC | Age: 67
End: 2017-07-17

## 2017-07-17 LAB
ESTIMATED PA SYSTOLIC PRESSURE: 43.45
MITRAL VALVE MOBILITY: NORMAL
MITRAL VALVE REGURGITATION: ABNORMAL
RETIRED EF AND QEF - SEE NOTES: 50 (ref 55–65)
TRICUSPID VALVE REGURGITATION: ABNORMAL

## 2017-07-18 ENCOUNTER — OFFICE VISIT (OUTPATIENT)
Dept: CARDIOLOGY | Facility: CLINIC | Age: 67
End: 2017-07-18
Payer: MEDICARE

## 2017-07-18 ENCOUNTER — HOSPITAL ENCOUNTER (OUTPATIENT)
Dept: RADIOLOGY | Facility: HOSPITAL | Age: 67
Discharge: HOME | End: 2017-07-18
Attending: INTERNAL MEDICINE
Payer: MEDICARE

## 2017-07-18 ENCOUNTER — TELEPHONE (OUTPATIENT)
Dept: FAMILY MEDICINE | Facility: CLINIC | Age: 67
End: 2017-07-18

## 2017-07-18 VITALS
SYSTOLIC BLOOD PRESSURE: 101 MMHG | DIASTOLIC BLOOD PRESSURE: 63 MMHG | WEIGHT: 139.56 LBS | HEART RATE: 63 BPM | HEIGHT: 65 IN | BODY MASS INDEX: 23.25 KG/M2

## 2017-07-18 DIAGNOSIS — R09.1 PLEURITIS: ICD-10-CM

## 2017-07-18 DIAGNOSIS — I25.10 CORONARY ARTERY DISEASE, ANGINA PRESENCE UNSPECIFIED, UNSPECIFIED VESSEL OR LESION TYPE, UNSPECIFIED WHETHER NATIVE OR TRANSPLANTED HEART: Primary | ICD-10-CM

## 2017-07-18 DIAGNOSIS — E78.5 DYSLIPIDEMIA: ICD-10-CM

## 2017-07-18 DIAGNOSIS — Z95.1 S/P CABG (CORONARY ARTERY BYPASS GRAFT): ICD-10-CM

## 2017-07-18 DIAGNOSIS — I25.10 CORONARY ARTERY DISEASE INVOLVING NATIVE CORONARY ARTERY OF NATIVE HEART WITHOUT ANGINA PECTORIS: ICD-10-CM

## 2017-07-18 DIAGNOSIS — R09.1 PLEURITIS: Primary | ICD-10-CM

## 2017-07-18 DIAGNOSIS — R06.02 SOB (SHORTNESS OF BREATH) ON EXERTION: ICD-10-CM

## 2017-07-18 DIAGNOSIS — I73.9 PAD (PERIPHERAL ARTERY DISEASE): ICD-10-CM

## 2017-07-18 PROCEDURE — 99499 UNLISTED E&M SERVICE: CPT | Mod: S$PBB,,, | Performed by: INTERNAL MEDICINE

## 2017-07-18 PROCEDURE — 99999 PR PBB SHADOW E&M-EST. PATIENT-LVL III: CPT | Mod: PBBFAC,,, | Performed by: INTERNAL MEDICINE

## 2017-07-18 PROCEDURE — 71260 CT THORAX DX C+: CPT | Mod: 26,,, | Performed by: RADIOLOGY

## 2017-07-18 PROCEDURE — 99214 OFFICE O/P EST MOD 30 MIN: CPT | Mod: S$GLB,,, | Performed by: INTERNAL MEDICINE

## 2017-07-18 PROCEDURE — 25500020 PHARM REV CODE 255: Mod: PO | Performed by: INTERNAL MEDICINE

## 2017-07-18 PROCEDURE — 71260 CT THORAX DX C+: CPT | Mod: TC,PO

## 2017-07-18 RX ADMIN — IOHEXOL 75 ML: 350 INJECTION, SOLUTION INTRAVENOUS at 12:07

## 2017-07-18 NOTE — PROGRESS NOTES
Subjective:    Patient ID:  Amber Sharp is a 66 y.o. female who presents for follow-up of cad    HPI  She comes with pleuritic type pain as well as weight loss    Review of Systems   Constitution: Positive for weakness, malaise/fatigue and weight loss. Negative for decreased appetite and weight gain.   Cardiovascular: Negative for chest pain, dyspnea on exertion, leg swelling, palpitations and syncope.   Respiratory: Negative for cough and shortness of breath.    Gastrointestinal: Negative.    All other systems reviewed and are negative.       Objective:    Physical Exam   Constitutional: She is oriented to person, place, and time. She appears well-developed and well-nourished.   HENT:   Head: Normocephalic.   Eyes: Pupils are equal, round, and reactive to light.   Neck: Normal range of motion. Neck supple. No JVD present. Carotid bruit is not present. No thyromegaly present.   Cardiovascular: Normal rate, regular rhythm, normal heart sounds, intact distal pulses and normal pulses.  PMI is not displaced.  Exam reveals no gallop.    No murmur heard.  Pulmonary/Chest: Effort normal and breath sounds normal.   Abdominal: Soft. Normal appearance. She exhibits no mass. There is no hepatosplenomegaly. There is no tenderness.   Musculoskeletal: Normal range of motion. She exhibits no edema.   Neurological: She is alert and oriented to person, place, and time. She has normal strength and normal reflexes. No sensory deficit.   Skin: Skin is warm and intact.   Psychiatric: She has a normal mood and affect.   Nursing note and vitals reviewed.    Echo reviewed      Assessment:       1. Pleuritis    2. S/P CABG (coronary artery bypass graft) 1994, x 3    3. Dyslipidemia    4. Coronary artery disease involving native coronary artery of native heart without angina pectoris    5. PAD (peripheral artery disease) - PTA to both LE    6. SOB (shortness of breath) on exertion         Plan:   CT of chest  Call with results

## 2017-07-18 NOTE — TELEPHONE ENCOUNTER
Spoke with Cardiology nurse- requesting a sooner appointment with PCP Scheduled 8/10  - please review CT

## 2017-07-19 ENCOUNTER — TELEPHONE (OUTPATIENT)
Dept: HEMATOLOGY/ONCOLOGY | Facility: CLINIC | Age: 67
End: 2017-07-19

## 2017-07-19 ENCOUNTER — TELEPHONE (OUTPATIENT)
Dept: VASCULAR SURGERY | Facility: CLINIC | Age: 67
End: 2017-07-19

## 2017-07-19 ENCOUNTER — TELEPHONE (OUTPATIENT)
Dept: CARDIOLOGY | Facility: CLINIC | Age: 67
End: 2017-07-19

## 2017-07-19 NOTE — TELEPHONE ENCOUNTER
----- Message from Milena Cornell sent at 7/19/2017 11:07 AM CDT -----  Dr. Ha has found a tumor in patient's lung and directed her back to your office. She needs an appointment as soon as possible. Please call back with availability at [phone].

## 2017-07-19 NOTE — TELEPHONE ENCOUNTER
----- Message from Antonina Calvo RN sent at 7/19/2017  3:45 PM CDT -----  Dr. Brothers is requesting that this patient be seen to have a lung biopsy of a soft tissue denisty mass sitting on the left ventricle and the meidastinum.

## 2017-07-19 NOTE — TELEPHONE ENCOUNTER
----- Message from Brinda Westfall sent at 7/19/2017  9:06 AM CDT -----  Contact: Patient's daughter Evelin Waters is calling for test results. Please call Evelin at [phone].

## 2017-07-19 NOTE — TELEPHONE ENCOUNTER
Patient notified that Dr. Brothers would like her to see a Thoracic surgeon for a biopsy prior to seeing her. Patient verbalized understanding and Dr. Kim office notified.

## 2017-07-20 ENCOUNTER — TELEPHONE (OUTPATIENT)
Dept: HEMATOLOGY/ONCOLOGY | Facility: CLINIC | Age: 67
End: 2017-07-20

## 2017-07-20 ENCOUNTER — OFFICE VISIT (OUTPATIENT)
Dept: FAMILY MEDICINE | Facility: CLINIC | Age: 67
End: 2017-07-20
Payer: MEDICARE

## 2017-07-20 VITALS
HEIGHT: 65 IN | SYSTOLIC BLOOD PRESSURE: 101 MMHG | DIASTOLIC BLOOD PRESSURE: 62 MMHG | WEIGHT: 141.13 LBS | OXYGEN SATURATION: 98 % | HEART RATE: 78 BPM | BODY MASS INDEX: 23.52 KG/M2

## 2017-07-20 DIAGNOSIS — I51.89 RIGHT VENTRICULAR MASS: Primary | ICD-10-CM

## 2017-07-20 DIAGNOSIS — J98.59 MEDIASTINAL MASS: ICD-10-CM

## 2017-07-20 DIAGNOSIS — R91.8 LUNG MASS: Primary | ICD-10-CM

## 2017-07-20 PROCEDURE — 99999 PR PBB SHADOW E&M-EST. PATIENT-LVL III: CPT | Mod: PBBFAC,,, | Performed by: FAMILY MEDICINE

## 2017-07-20 PROCEDURE — 99213 OFFICE O/P EST LOW 20 MIN: CPT | Mod: S$GLB,,, | Performed by: FAMILY MEDICINE

## 2017-07-20 NOTE — PROGRESS NOTES
"Subjective:       Patient ID: Amber Sharp is a 66 y.o. female.    Chief Complaint: Results    HPI     Patient here with daughter.     Reviewed recent ct scan of chest with patient.      Ct chest showed:    "Large irregular heterogeneous soft tissue density mass sitting on the left ventricle and the mediastinum.  This does invade the heart and surrounds the coronary arteries. The differential includes a myocardial sarcoma, lung carcinoma with cardiac invasion or metastatic breast carcinoma.  Additional enlarged lymph nodes are noted in the mediastinum."      Review of Systems      Review of Systems   Constitutional: Negative for fever and chills.   HENT: Negative for hearing loss and neck stiffness.    Eyes: Negative for redness and itching.   Respiratory: Negative for cough and choking.    Cardiovascular: Negative for leg swelling.  Abdomen: Negative for abdominal pain and blood in stool.   Genitourinary: Negative for dysuria and flank pain.   Musculoskeletal: Negative for back pain and gait problem.   Neurological: Negative for light-headedness and headaches.   Hematological: Negative for adenopathy.   Psychiatric/Behavioral: Negative for behavioral problems.     Objective:      Physical Exam   Constitutional: She appears well-developed.   HENT:   Head: Normocephalic and atraumatic.   Eyes: Conjunctivae are normal. Pupils are equal, round, and reactive to light.   Neck: Normal range of motion.   Cardiovascular: Normal rate and regular rhythm.    No murmur heard.  Pulmonary/Chest: Effort normal and breath sounds normal. She has no wheezes.   Lymphadenopathy:     She has no cervical adenopathy.       Assessment:       1. Lung mass        Plan:       Lung mass    patient will have a ct guided biopsy next week.          "

## 2017-07-20 NOTE — TELEPHONE ENCOUNTER
Returned call to patient and informed that the request to review ct scan for possible ct-guided biopsy has been sent to [facility] and the radiologists are to review this afternoon or first thing tomorrow and let us know if they can biopsy. I will call patient as soon as we hear from the radiologist. Patient voiced understanding and appreciation      I called and spoke with Jeanie at [facility] [phone] who stated she has given the case to the radiologist to review and will check on the case and let us know.She will also call the patient when she has a decision from the radiologist

## 2017-07-20 NOTE — TELEPHONE ENCOUNTER
----- Message from Leroy Hernández sent at 7/20/2017  3:35 PM CDT -----  Contact: Dtr/Evelin  Sharon called in regarding the attached patient (mom-Amber) and wanted to check in to see if Dr. Brothers had found someone to do patient biopsy?  Evelin's call back number is [phone]

## 2017-07-21 ENCOUNTER — TELEPHONE (OUTPATIENT)
Dept: HEMATOLOGY/ONCOLOGY | Facility: CLINIC | Age: 67
End: 2017-07-21

## 2017-07-21 ENCOUNTER — TELEPHONE (OUTPATIENT)
Dept: FAMILY MEDICINE | Facility: CLINIC | Age: 67
End: 2017-07-21

## 2017-07-21 NOTE — TELEPHONE ENCOUNTER
----- Message from Brnida Westfall sent at 7/21/2017  2:04 PM CDT -----  Contact: Daughter, Evelin  The biopsy is scheduled 7-26-17 at [facility]. Any questions call Evelin at  [phone]

## 2017-07-21 NOTE — TELEPHONE ENCOUNTER
Dr Martin reviewed CT scan at the request of Dr King and suggests patient have a CT Guided biopsy which is scheduled for 7/26/17. We will await those results to see if consultation is needed.

## 2017-07-21 NOTE — TELEPHONE ENCOUNTER
----- Message from Brinda Westfall sent at 7/21/2017  2:06 PM CDT -----  Contact: Evelin, daughter  Patient is having a biopsy on 726-17 so she needs to reschedule appointment. Please call Evelin at [phone].

## 2017-07-25 ENCOUNTER — TELEPHONE (OUTPATIENT)
Dept: HEMATOLOGY/ONCOLOGY | Facility: CLINIC | Age: 67
End: 2017-07-25

## 2017-07-25 NOTE — TELEPHONE ENCOUNTER
----- Message from Larisa Cardoza sent at 7/25/2017 12:47 PM CDT -----  Contact: Evelin, daughter  Evelin, daughter [phone],   Calling about appointment for tomorrow. It will needs to be rescheduled for later this week. Patient is scheduled for a biopsy at 9 AM on 7/26/17 at [facility]. Please advise. Thanks.

## 2017-07-26 ENCOUNTER — TELEPHONE (OUTPATIENT)
Dept: HEMATOLOGY/ONCOLOGY | Facility: CLINIC | Age: 67
End: 2017-07-26

## 2017-07-26 PROBLEM — J98.59 MEDIASTINAL MASS: Status: ACTIVE | Noted: 2017-07-26

## 2017-07-26 NOTE — TELEPHONE ENCOUNTER
----- Message from Dianne Squires sent at 7/26/2017 12:26 PM CDT -----  Please call patients daughter in regards to rescheduling, [phone], Evelin

## 2017-07-26 NOTE — TELEPHONE ENCOUNTER
Called pts daughter and offered to reschedule pts Dr. Brothers appt since pt is having her bx today and is unable to keep her Dr. Brothers appt.  Pts daughter states they would like to be seen tomorrow in [place].  Explained to pts daughter the bx results will not be ready for Dr. Brothers tomorrow and pts daughter states she understands that but they have plenty questions and are extremely worried and still wish to be seen tomorrow.  Dr. Brothers appt scheduled for tomorrow in [place].  Pts daughter verbalizes understanding.

## 2017-07-26 NOTE — TELEPHONE ENCOUNTER
Called pts daughter, Evelin, and left her a message informing her that I informed Dr. Brothers that pt is on her schedule for tomorrow in [place] and there will be no pathology for Dr. Brothers to review since pt is getting bx done today and that Dr. Brothers wanted me to explain to daughter that since the pathology results will not be resulted there will be an extremely limited amount of info that Dr. Brothers can discuss with pt as she needs the path results to make a dx and tx plan.  Also left in my message that the path results normally take about 5 business days to result and we will keep an eye out for them and make pt an appt with either Dr. Caballero or Dr. Alvarado if the results come back when Dr. Brothers is out.  Asked pts daughter to please return my call at [phone].

## 2017-07-26 NOTE — TELEPHONE ENCOUNTER
Returned pts daughter's call and canceled pts Dr. Brothers appt and transferred her call to Dr. Brothers per Dr. Brothers's request.

## 2017-07-27 ENCOUNTER — TELEPHONE (OUTPATIENT)
Dept: HEMATOLOGY/ONCOLOGY | Facility: CLINIC | Age: 67
End: 2017-07-27

## 2017-07-27 DIAGNOSIS — C34.00 MALIGNANT NEOPLASM OF HILUS OF LUNG, UNSPECIFIED LATERALITY: Primary | ICD-10-CM

## 2017-07-27 NOTE — TELEPHONE ENCOUNTER
Romaine betacnourt called to get more information on pathology report requested by Dr. Brothers. Message left ofr [facility] to call [phone]

## 2017-08-01 ENCOUNTER — TELEPHONE (OUTPATIENT)
Dept: HEMATOLOGY/ONCOLOGY | Facility: CLINIC | Age: 67
End: 2017-08-01

## 2017-08-01 DIAGNOSIS — R91.8 LUNG MASS: Primary | ICD-10-CM

## 2017-08-01 NOTE — TELEPHONE ENCOUNTER
Spoke with Ellen at [facility] to notify her of additional testing for EGFR, PDL-1, ALK, ROS-1 and to get a primary source. Ellen verbalized understanding and gave fax number [phone]

## 2017-08-01 NOTE — TELEPHONE ENCOUNTER
Daughter notified that a port is needed and someone from Dr. Lindo office will call her to schedule. Pet/ct scheduled and daughter notified that Dr. Brothers would like to get a liquid biopsy one on the patient asap and that Madonna Arteaga will be calling to set up. Daughter verbalized understanding and state that the patient is having blood work on the 3rd with urine and it could be done then.

## 2017-08-01 NOTE — TELEPHONE ENCOUNTER
----- Message from Gareth Wood sent at 8/1/2017  1:27 PM CDT -----  Contact: patient's daugher Evelin  patient's hanugher Sharon called regarding port.please call back at [phone] to discuss. Thanks,

## 2017-08-02 ENCOUNTER — TELEPHONE (OUTPATIENT)
Dept: HEMATOLOGY/ONCOLOGY | Facility: CLINIC | Age: 67
End: 2017-08-02

## 2017-08-02 NOTE — TELEPHONE ENCOUNTER
Daughter notified of port appointment on 8/14/17 at 2:15 with Dr. Lindo. Daughter verbalized understanding.

## 2017-08-02 NOTE — TELEPHONE ENCOUNTER
----- Message from Vianey Varghese sent at 8/2/2017  9:55 AM CDT -----  Contact: Evelin (daughter)  Evelin (daughter) calling in regards to speaking with a Nurse about the port the patient is to have put in. Please advise.  Call back .  Thanks!

## 2017-08-03 ENCOUNTER — LAB VISIT (OUTPATIENT)
Dept: LAB | Facility: HOSPITAL | Age: 67
End: 2017-08-03
Attending: FAMILY MEDICINE
Payer: MEDICARE

## 2017-08-03 DIAGNOSIS — E08.00 DIABETES MELLITUS DUE TO UNDERLYING CONDITION WITH HYPEROSMOLARITY WITHOUT COMA, WITHOUT LONG-TERM CURRENT USE OF INSULIN: ICD-10-CM

## 2017-08-03 LAB
CREAT UR-MCNC: 92.7 MG/DL
MICROALBUMIN UR DL<=1MG/L-MCNC: <6 UG/ML
MICROALBUMIN/CREATININE RATIO: NORMAL UG/MG

## 2017-08-03 PROCEDURE — 82570 ASSAY OF URINE CREATININE: CPT | Mod: PN

## 2017-08-03 PROCEDURE — 82570 ASSAY OF URINE CREATININE: CPT

## 2017-08-07 ENCOUNTER — TELEPHONE (OUTPATIENT)
Dept: HEMATOLOGY/ONCOLOGY | Facility: CLINIC | Age: 67
End: 2017-08-07

## 2017-08-07 NOTE — TELEPHONE ENCOUNTER
Hey you were working on this, does she need anything else for follow up post pet scan??? Please let me know so we can schedule

## 2017-08-07 NOTE — TELEPHONE ENCOUNTER
----- Message from Thuy Moreno sent at 8/7/2017  3:25 PM CDT -----  Pt has the pet scan tomorrow /asking to schedule follow up appointment / call han Waters [phone]

## 2017-08-08 ENCOUNTER — HOSPITAL ENCOUNTER (OUTPATIENT)
Dept: RADIOLOGY | Facility: HOSPITAL | Age: 67
Discharge: HOME | End: 2017-08-08
Attending: INTERNAL MEDICINE
Payer: MEDICARE

## 2017-08-08 ENCOUNTER — TELEPHONE (OUTPATIENT)
Dept: HEMATOLOGY/ONCOLOGY | Facility: CLINIC | Age: 67
End: 2017-08-08

## 2017-08-08 DIAGNOSIS — C34.00 MALIGNANT NEOPLASM OF HILUS OF LUNG, UNSPECIFIED LATERALITY: ICD-10-CM

## 2017-08-08 PROCEDURE — 78815 PET IMAGE W/CT SKULL-THIGH: CPT | Mod: 26,PI,, | Performed by: RADIOLOGY

## 2017-08-08 PROCEDURE — A9552 F18 FDG: HCPCS | Mod: PO

## 2017-08-08 NOTE — TELEPHONE ENCOUNTER
Patient scheduled in [place] on 8/9/17 at 10:00am. Patient's daughter verbalized understanding of time and place.

## 2017-08-08 NOTE — TELEPHONE ENCOUNTER
----- Message from Taylor Graham sent at 8/8/2017 11:16 AM CDT -----  Vahe Paris with [employer] states the received an order for the Guardian 360 test, but it is missing the Dx Code, please fax information to [phone]. Contact Vahe at [phone] option 1 reference case number [id number].     Thank you

## 2017-08-09 ENCOUNTER — OFFICE VISIT (OUTPATIENT)
Dept: HEMATOLOGY/ONCOLOGY | Facility: CLINIC | Age: 67
End: 2017-08-09
Payer: MEDICARE

## 2017-08-09 ENCOUNTER — LAB VISIT (OUTPATIENT)
Dept: LAB | Facility: HOSPITAL | Age: 67
End: 2017-08-09
Attending: INTERNAL MEDICINE
Payer: MEDICARE

## 2017-08-09 VITALS
DIASTOLIC BLOOD PRESSURE: 55 MMHG | SYSTOLIC BLOOD PRESSURE: 111 MMHG | RESPIRATION RATE: 20 BRPM | BODY MASS INDEX: 23.14 KG/M2 | HEIGHT: 65 IN | HEART RATE: 61 BPM | WEIGHT: 138.88 LBS | TEMPERATURE: 99 F

## 2017-08-09 DIAGNOSIS — Z95.1 S/P CABG (CORONARY ARTERY BYPASS GRAFT): ICD-10-CM

## 2017-08-09 DIAGNOSIS — I10 ESSENTIAL HYPERTENSION: ICD-10-CM

## 2017-08-09 DIAGNOSIS — C34.00 MALIGNANT NEOPLASM OF HILUS OF LUNG, UNSPECIFIED LATERALITY: Primary | ICD-10-CM

## 2017-08-09 DIAGNOSIS — C50.919 MALIGNANT NEOPLASM OF FEMALE BREAST, UNSPECIFIED ESTROGEN RECEPTOR STATUS, UNSPECIFIED LATERALITY, UNSPECIFIED SITE OF BREAST: Primary | ICD-10-CM

## 2017-08-09 DIAGNOSIS — E08.00 DIABETES MELLITUS DUE TO UNDERLYING CONDITION WITH HYPEROSMOLARITY WITHOUT COMA, WITHOUT LONG-TERM CURRENT USE OF INSULIN: ICD-10-CM

## 2017-08-09 DIAGNOSIS — C34.00 MALIGNANT NEOPLASM OF HILUS OF LUNG, UNSPECIFIED LATERALITY: ICD-10-CM

## 2017-08-09 DIAGNOSIS — E78.5 DYSLIPIDEMIA: ICD-10-CM

## 2017-08-09 DIAGNOSIS — C50.011 MALIGNANT NEOPLASM OF NIPPLE OF RIGHT BREAST IN FEMALE, UNSPECIFIED ESTROGEN RECEPTOR STATUS: ICD-10-CM

## 2017-08-09 LAB
ALBUMIN SERPL BCP-MCNC: 2.7 G/DL
ALP SERPL-CCNC: 73 U/L
ALT SERPL W/O P-5'-P-CCNC: 14 U/L
ANION GAP SERPL CALC-SCNC: 11 MMOL/L
AST SERPL-CCNC: 25 U/L
BASOPHILS # BLD AUTO: 0 K/UL
BASOPHILS NFR BLD: 0.3 %
BILIRUB SERPL-MCNC: 0.4 MG/DL
BUN SERPL-MCNC: 8 MG/DL
CALCIUM SERPL-MCNC: 9 MG/DL
CHLORIDE SERPL-SCNC: 106 MMOL/L
CO2 SERPL-SCNC: 23 MMOL/L
CREAT SERPL-MCNC: 0.8 MG/DL
DIFFERENTIAL METHOD: ABNORMAL
EOSINOPHIL # BLD AUTO: 0.3 K/UL
EOSINOPHIL NFR BLD: 3 %
ERYTHROCYTE [DISTWIDTH] IN BLOOD BY AUTOMATED COUNT: 18.5 %
EST. GFR  (AFRICAN AMERICAN): >60 ML/MIN/1.73 M^2
EST. GFR  (NON AFRICAN AMERICAN): >60 ML/MIN/1.73 M^2
GLUCOSE SERPL-MCNC: 93 MG/DL
HCT VFR BLD AUTO: 28.1 %
HGB BLD-MCNC: 9.2 G/DL
LYMPHOCYTES # BLD AUTO: 2.9 K/UL
LYMPHOCYTES NFR BLD: 30.7 %
MCH RBC QN AUTO: 27.1 PG
MCHC RBC AUTO-ENTMCNC: 32.8 G/DL
MCV RBC AUTO: 83 FL
MONOCYTES # BLD AUTO: 0.8 K/UL
MONOCYTES NFR BLD: 8.7 %
NEUTROPHILS # BLD AUTO: 5.5 K/UL
NEUTROPHILS NFR BLD: 57.3 %
PLATELET # BLD AUTO: 490 K/UL
PMV BLD AUTO: 8.2 FL
POTASSIUM SERPL-SCNC: 3.8 MMOL/L
PROT SERPL-MCNC: 7.3 G/DL
RBC # BLD AUTO: 3.4 M/UL
SODIUM SERPL-SCNC: 140 MMOL/L
WBC # BLD AUTO: 9.6 K/UL

## 2017-08-09 PROCEDURE — 36415 COLL VENOUS BLD VENIPUNCTURE: CPT

## 2017-08-09 PROCEDURE — 3078F DIAST BP <80 MM HG: CPT | Mod: S$GLB,,, | Performed by: INTERNAL MEDICINE

## 2017-08-09 PROCEDURE — 99999 PR PBB SHADOW E&M-EST. PATIENT-LVL III: CPT | Mod: PBBFAC,,, | Performed by: INTERNAL MEDICINE

## 2017-08-09 PROCEDURE — 80053 COMPREHEN METABOLIC PANEL: CPT

## 2017-08-09 PROCEDURE — 3074F SYST BP LT 130 MM HG: CPT | Mod: S$GLB,,, | Performed by: INTERNAL MEDICINE

## 2017-08-09 PROCEDURE — 85025 COMPLETE CBC W/AUTO DIFF WBC: CPT

## 2017-08-09 PROCEDURE — 99499 UNLISTED E&M SERVICE: CPT | Mod: S$PBB,,, | Performed by: INTERNAL MEDICINE

## 2017-08-09 PROCEDURE — 99215 OFFICE O/P EST HI 40 MIN: CPT | Mod: S$GLB,,, | Performed by: INTERNAL MEDICINE

## 2017-08-09 RX ORDER — LIDOCAINE AND PRILOCAINE 25; 25 MG/G; MG/G
CREAM TOPICAL
Qty: 5 G | Refills: 0 | Status: SHIPPED | OUTPATIENT
Start: 2017-08-09 | End: 2017-09-29 | Stop reason: SDUPTHER

## 2017-08-09 RX ORDER — ONDANSETRON 8 MG/1
8 TABLET, ORALLY DISINTEGRATING ORAL EVERY 12 HOURS PRN
Qty: 30 TABLET | Refills: 1 | Status: SHIPPED | OUTPATIENT
Start: 2017-08-09 | End: 2017-09-29 | Stop reason: SDUPTHER

## 2017-08-09 RX ORDER — PROCHLORPERAZINE MALEATE 10 MG
10 TABLET ORAL EVERY 6 HOURS PRN
Qty: 30 TABLET | Refills: 1 | Status: SHIPPED | OUTPATIENT
Start: 2017-08-09 | End: 2017-11-17 | Stop reason: SDUPTHER

## 2017-08-09 NOTE — PROGRESS NOTES
This is a 66-year-old  woman known to me.  The patient has been   following from a diagnosis of breast cancer in 2007.  She was treated at [facility].  At that time, mitoxantrone and Cytoxan was used because of significant   extensive coronary artery disease and peripheral vascular disease.  The patient   underwent double mastectomy, radiation therapy and adjuvant therapy all at   [facility], under the guidance of Dr. Downing.  She was seen by me for B12   deficiency, hypertension, diabetes, dyslipidemia, coronary artery disease and   depression, and at the last visit, she had complained of feeling more tired and   sleeping a lot, and had not had a recent follow up with Dr. Ha and hence, we   sent her back for cardiac evaluation.  The patient had imaging study done, an   echocardiogram that showed a mass that was wrapping around the heart.  This   raised immediate suspicion.  The patient had been seen by Dr. Hopkins in lieu of   Dr. Mclaughlin, and the patient's family practice doctor, Dr. King, was   contacted.  We asked for an immediate biopsy of this mass.  The films were   discussed with Dr. Martin.  Interventional radiology-guided biopsy was   recommended.  The patient was sent for biopsy with Dr. Connolly at [facility].  Pathology comes back as squamous cell CA.  The patient also   had a PET scan done.  A port date has been scheduled with an appointment on the   14th of this month.  The patient is here now to discuss her further options and   PET scan result.  She is very tearful.  Her daughter is here. There is a high   level of anxiety.  The patient already suffers from depression.  This only   compounds a problem.  She is here with multiple family members to discuss   treatment.  Throughout this time, we had multiple phone conversations, guiding   the patient, and helping them understand, expediting the whole process, and   therefore, they are comfortable with today's visit,  and most of this information   has already been communicated with the patient very frequently.    PHYSICAL EXAMINATION:  GENERAL:  Very agitated, anxious and the patient is crying.  NEURO:  Awake, alert and oriented to time, person and place.  The patient   demonstrates no evidence of depression, anxiety or agitation.  Patient is   cooperative with exam and discussion.  EYES:  Normal conjunctivae and eyelids.  PERRLA 3 to 4 mm without icterus.  ENT AND MOUTH:  External appearance of ears and nose normal without scars,   lesions or masses.  Nasal mucosa, turbinates and septum are normal.  No ENT   drainage and dried blood noted.  No tenderness over frontal or maxillary sinus.    Lips and gums are normal.  Dentition is normal.  NECK:  Supple.  Trachea is central.  No crepitus.  No JVD.  No thyromegaly or   masses.   RESPIRATORY:  No intercostal retractions and no use of accessory muscles of   respirations noted.  No areas of dullness to percussion.  Chest is clear to   auscultation.  No rales, rhonchi or wheezes.  No crepitus.  Good air entry   bilaterally.  CARDIOVASCULAR:  No cardiomegaly.  Normal location.  No thrills or heaviness.    Normal carotid pulse.  No bruits.  S1 and S2 are normally heard without murmurs   or gallops.  All peripheral pulses, including pedal pulses, are present.  ABDOMEN:  Normal abdomen.  No hepatosplenomegaly.  No free fluid.  Bowel sounds   are present.  No hernia noted.  No masses.  No rebound or tenderness.  No   guarding or rigidity.   Umbilicus is midline.  LYMPHATICS:  No axillary, cervical, supraclavicular, submental, or inguinal   lymphadenopathy.  SKIN AND MUSCULOSKELETAL:  There is no evidence of excoriation marks or   ecchymosis.  No rashes.  No pigmented lesions, induration or subcutaneous   nodules.  No sores or abnormal moles.  No cyanosis.  No clubbing.  Nailbed   abnormalities are noted. No joint or skeletal deformities noted.  Normal range   of motion.  BREASTS:  No  abnormal masses or discharge.  NEUROLOGIC:  Higher functions are appropriate.  No cranial nerve deficits.    Normal gait.  Normal strength.  Motor and sensory functions are normal.  Deep   tendon reflexes are normal without Babinski's sign or ankle clonus.  GENITAL AND RECTAL:  Exams are deferred.    LABORATORIES:  Today show a CBC with mild anemia, hemoglobin of 9.6, H and H 9.2   and 28.1, platelets of 490, this is a decrease from before.  Renal functions   are appropriate.  GFR is within normal range.  Liver functions are within normal   range.  PET scan shows there is mass as described above, with the SUV of 17.3,   7.4 x 6.1 cm, with central necrosis.  Large cavitary hypermetabolic left upper   lobe lesion at the apex of the left ventricle as well.  Hypermetabolic nodes   present in the aortopulmonary region, prevascular anterior amber left hilar   region, with a much lower SUV of the range of 4.7.  No other abdominal, pelvis   or skeletal abnormalities noted.    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    PLAN:  1.  Lengthy discussion today.  The patient has a port appointment.  The patient   is an inappropriate candidate for carbo/Taxol based chemotherapy in the past.    Her cardiac functions have been a big concern.  Her ejection fraction remains   the same from the most recent echo at 50% and the EGFR will allow the use of   platins.  We will proceed with carboplatin AUC of 6, Taxol 175 mg/m2 q. three   weeks, with Neulasta support, orders placed, antiemetics sent to her pharmacy   including Zofran and Compazine, mouthwash is also sent.  2.  The patient to have EMLA cream sent out.  The patient to start chemotherapy   ASAP.  We will add iron studies to today's labs to see if iron and B12 have to   be adjusted.  The patient has been reassured.  We had a lengthy visit today.    She needs a chemo class and to keep her port  appointment.  She voices   understanding of plan.  Upwards of 50 minutes spent with this patient.      SSS/PN  dd: 08/09/2017 13:58:35 (CDT)  td: 08/10/2017 04:00:36 (CDT)  Doc ID   #[id number]  Job ID #[id number]    CC:

## 2017-08-11 ENCOUNTER — TELEPHONE (OUTPATIENT)
Dept: HEMATOLOGY/ONCOLOGY | Facility: CLINIC | Age: 67
End: 2017-08-11

## 2017-08-11 NOTE — TELEPHONE ENCOUNTER
----- Message from Isabella Barker sent at 8/11/2017  1:59 PM CDT -----  Contact: Radha/[facility]  Radha needs to speak to the nurse regarding pt's rx for Prochlorprazine. Radha can be reached at [phone]. Radha states to pls call her back as soon as possible because a decision will be made by 5pm today.

## 2017-08-14 ENCOUNTER — OFFICE VISIT (OUTPATIENT)
Dept: SURGERY | Facility: CLINIC | Age: 67
End: 2017-08-14
Payer: MEDICARE

## 2017-08-14 VITALS
SYSTOLIC BLOOD PRESSURE: 107 MMHG | TEMPERATURE: 98 F | WEIGHT: 137.81 LBS | DIASTOLIC BLOOD PRESSURE: 54 MMHG | BODY MASS INDEX: 22.93 KG/M2 | HEART RATE: 59 BPM

## 2017-08-14 DIAGNOSIS — C80.1 CANCER: ICD-10-CM

## 2017-08-14 DIAGNOSIS — C34.00 MALIGNANT NEOPLASM OF HILUS OF LUNG, UNSPECIFIED LATERALITY: Primary | ICD-10-CM

## 2017-08-14 PROCEDURE — 99499 UNLISTED E&M SERVICE: CPT | Mod: S$PBB,,, | Performed by: SURGERY

## 2017-08-14 PROCEDURE — 99203 OFFICE O/P NEW LOW 30 MIN: CPT | Mod: S$GLB,,, | Performed by: SURGERY

## 2017-08-14 PROCEDURE — 99999 PR PBB SHADOW E&M-EST. PATIENT-LVL III: CPT | Mod: PBBFAC,,, | Performed by: SURGERY

## 2017-08-14 PROCEDURE — 3074F SYST BP LT 130 MM HG: CPT | Mod: S$GLB,,, | Performed by: SURGERY

## 2017-08-14 PROCEDURE — 3008F BODY MASS INDEX DOCD: CPT | Mod: S$GLB,,, | Performed by: SURGERY

## 2017-08-14 PROCEDURE — 3078F DIAST BP <80 MM HG: CPT | Mod: S$GLB,,, | Performed by: SURGERY

## 2017-08-14 RX ORDER — SODIUM CHLORIDE 9 MG/ML
INJECTION, SOLUTION INTRAVENOUS CONTINUOUS
Status: CANCELLED | OUTPATIENT
Start: 2017-08-14

## 2017-08-14 NOTE — PROGRESS NOTES
Subjective:       Patient ID: Amber Sharp is a 66 y.o. female.    Chief Complaint: Consult (port placement)    HPI 66-year-old female with recently diagnosed left primary lung carcinoma.  Diagnosis was 4 weeks ago.  Patient has a history of bilateral mastectomy for breast cancer.  This is felt to be a lung primary.  She was referred to me for Port-A-Cath placement.  She does complain of some shortness of breath especially after coughing spells which are becoming more frequent.  She has a history of a coronary artery bypass.    Review of Systems   Constitutional: Negative for activity change, chills, fever and unexpected weight change.   HENT: Negative for congestion, sore throat, trouble swallowing and voice change.    Eyes: Negative for redness and visual disturbance.   Respiratory: Positive for cough and shortness of breath. Negative for wheezing.    Cardiovascular: Negative for chest pain and palpitations.   Gastrointestinal: Negative for abdominal pain, blood in stool, nausea and vomiting.   Endocrine: Negative.    Genitourinary: Negative for dysuria, frequency and hematuria.   Musculoskeletal: Negative for arthralgias, back pain and neck pain.   Skin: Negative for rash and wound.   Allergic/Immunologic: Negative.    Neurological: Negative for dizziness, weakness and headaches.   Hematological: Negative for adenopathy.   Psychiatric/Behavioral: Negative for agitation and dysphoric mood. The patient is not nervous/anxious.      Objective:     Physical Exam   Constitutional: She is oriented to person, place, and time. She appears well-developed and well-nourished. No distress.   HENT:   Head: Normocephalic and atraumatic.   Mouth/Throat: Oropharynx is clear and moist. No oropharyngeal exudate.   Eyes: Conjunctivae and EOM are normal. Pupils are equal, round, and reactive to light. No scleral icterus.   Neck: Normal range of motion.   Cardiovascular: Normal rate and regular rhythm.    No murmur  heard.  Pulmonary/Chest: Effort normal and breath sounds normal. She has no wheezes. She has no rales.   Abdominal: Soft. Bowel sounds are normal. She exhibits no distension and no mass. There is no tenderness. No hernia.   Musculoskeletal: Normal range of motion. She exhibits no edema.   Neurological: She is alert and oriented to person, place, and time. No cranial nerve deficit.   Skin: Skin is warm and dry. No rash noted. No erythema.   Psychiatric: She has a normal mood and affect. Her behavior is normal.   Understandably and appropriately upset.     Assessment:     Encounter Diagnoses   Name Primary?    Malignant neoplasm of hilus of lung, unspecified laterality Yes    Cancer        Plan:      1.  Plan Port-A-Cath placement.  2. Risks and benefits of the planned procedure were discussed at length with the patient.  Risks and benefits of not proceeding with the procedure were discussed as well. All questions were answered. The patient expressed clear understanding and would like to proceed with the procedure as discussed.

## 2017-08-14 NOTE — LETTER
August 14, 2017      Alina Brothers MD  [address] [facility] [address]  [facility] [address]           [facility]  [address] [facility] [address] [place] [address]  Phone: [phone]          Patient: Amber Sharp   MR Number: [id number]   YOB: 1950   Date of Visit: 8/14/2017       Dear Dr. Alina Brothers:    Thank you for referring Amber Sharp to me for evaluation. Attached you will find relevant portions of my assessment and plan of care.    If you have questions, please do not hesitate to call me. I look forward to following Amber Sharp along with you.    Sincerely,    Benjy Lindo MD    Enclosure  CC:  No Recipients    If you would like to receive this communication electronically, please contact [email or web address] or [phone] to request more information on [facility] access.    For providers and/or their staff who would like to refer a patient to [facility], please contact us through our one-stop-shop provider referral line, [facility], at [phone].    If you feel you have received this communication in error or would no longer like to receive these types of communications, please e-mail [email or web address]

## 2017-08-15 ENCOUNTER — ANESTHESIA EVENT (OUTPATIENT)
Dept: SURGERY | Facility: HOSPITAL | Age: 67
End: 2017-08-15
Payer: MEDICARE

## 2017-08-15 ENCOUNTER — OFFICE VISIT (OUTPATIENT)
Dept: HEMATOLOGY/ONCOLOGY | Facility: CLINIC | Age: 67
End: 2017-08-15
Payer: MEDICARE

## 2017-08-15 VITALS
BODY MASS INDEX: 22.66 KG/M2 | DIASTOLIC BLOOD PRESSURE: 53 MMHG | RESPIRATION RATE: 20 BRPM | HEIGHT: 65 IN | HEART RATE: 65 BPM | TEMPERATURE: 98 F | WEIGHT: 136 LBS | SYSTOLIC BLOOD PRESSURE: 102 MMHG

## 2017-08-15 DIAGNOSIS — R05.9 COUGH: ICD-10-CM

## 2017-08-15 DIAGNOSIS — C34.00 MALIGNANT NEOPLASM OF HILUS OF LUNG, UNSPECIFIED LATERALITY: Primary | ICD-10-CM

## 2017-08-15 DIAGNOSIS — R52 PAIN: ICD-10-CM

## 2017-08-15 PROCEDURE — 3008F BODY MASS INDEX DOCD: CPT | Mod: S$GLB,,, | Performed by: NURSE PRACTITIONER

## 2017-08-15 PROCEDURE — 3078F DIAST BP <80 MM HG: CPT | Mod: S$GLB,,, | Performed by: NURSE PRACTITIONER

## 2017-08-15 PROCEDURE — 99999 PR PBB SHADOW E&M-EST. PATIENT-LVL III: CPT | Mod: PBBFAC,,, | Performed by: NURSE PRACTITIONER

## 2017-08-15 PROCEDURE — 3074F SYST BP LT 130 MM HG: CPT | Mod: S$GLB,,, | Performed by: NURSE PRACTITIONER

## 2017-08-15 PROCEDURE — 99214 OFFICE O/P EST MOD 30 MIN: CPT | Mod: S$GLB,,, | Performed by: NURSE PRACTITIONER

## 2017-08-15 RX ORDER — HYDROCODONE BITARTRATE AND ACETAMINOPHEN 5; 325 MG/1; MG/1
1 TABLET ORAL EVERY 4 HOURS PRN
Qty: 60 TABLET | Refills: 0 | Status: SHIPPED | OUTPATIENT
Start: 2017-08-15 | End: 2017-09-12 | Stop reason: SDUPTHER

## 2017-08-15 RX ORDER — HYDROCODONE POLISTIREX AND CHLORPHENIRAMINE POLISTIREX 10; 8 MG/5ML; MG/5ML
5 SUSPENSION, EXTENDED RELEASE ORAL EVERY 12 HOURS PRN
Qty: 115 ML | Refills: 0 | Status: SHIPPED | OUTPATIENT
Start: 2017-08-15 | End: 2017-08-31

## 2017-08-15 NOTE — OR NURSING
PAT phone call complete. Spoke with pt re: meds to take/not take, NPO status, need for  and need for antibacterial shower x2   Voiced understanding.

## 2017-08-15 NOTE — PROGRESS NOTES
Chemotherapy education given to patient and daughter.  Rx's for Norco/Tussionex escribed to [facility].  CHERRIE Rutherford LPN working on [facility] appointment.  YAMIL Calvo RN navigating.  Patient to start chemotherapy on Monday, 08/21 at 0930.

## 2017-08-16 ENCOUNTER — ANESTHESIA (OUTPATIENT)
Dept: SURGERY | Facility: HOSPITAL | Age: 67
End: 2017-08-16
Payer: MEDICARE

## 2017-08-16 ENCOUNTER — SURGERY (OUTPATIENT)
Age: 67
End: 2017-08-16

## 2017-08-16 ENCOUNTER — TELEPHONE (OUTPATIENT)
Dept: FAMILY MEDICINE | Facility: CLINIC | Age: 67
End: 2017-08-16

## 2017-08-16 ENCOUNTER — HOSPITAL ENCOUNTER (OUTPATIENT)
Facility: HOSPITAL | Age: 67
Discharge: HOME | End: 2017-08-16
Attending: SURGERY | Admitting: SURGERY
Payer: MEDICARE

## 2017-08-16 DIAGNOSIS — C34.00 MALIGNANT NEOPLASM OF HILUS OF LUNG, UNSPECIFIED LATERALITY: ICD-10-CM

## 2017-08-16 DIAGNOSIS — C34.90 SQUAMOUS CELL LUNG CANCER, UNSPECIFIED LATERALITY: Primary | ICD-10-CM

## 2017-08-16 DIAGNOSIS — C80.1 CANCER: Primary | ICD-10-CM

## 2017-08-16 PROCEDURE — D9220A PRA ANESTHESIA: Mod: ANES,,, | Performed by: ANESTHESIOLOGY

## 2017-08-16 PROCEDURE — 99900103 DSU ONLY-NO CHARGE-INITIAL HR (STAT): Performed by: SURGERY

## 2017-08-16 PROCEDURE — 25000003 PHARM REV CODE 250: Performed by: SURGERY

## 2017-08-16 PROCEDURE — 25000003 PHARM REV CODE 250: Performed by: NURSE ANESTHETIST, CERTIFIED REGISTERED

## 2017-08-16 PROCEDURE — 71000039 HC RECOVERY, EACH ADD'L HOUR: Performed by: SURGERY

## 2017-08-16 PROCEDURE — 36561 INSERT TUNNELED CV CATH: CPT | Mod: ,,, | Performed by: SURGERY

## 2017-08-16 PROCEDURE — 99900104 DSU ONLY-NO CHARGE-EA ADD'L HR (STAT): Performed by: SURGERY

## 2017-08-16 PROCEDURE — C1788 PORT, INDWELLING, IMP: HCPCS | Performed by: SURGERY

## 2017-08-16 PROCEDURE — 37000009 HC ANESTHESIA EA ADD 15 MINS: Performed by: SURGERY

## 2017-08-16 PROCEDURE — 27200651 HC AIRWAY, LMA: Performed by: NURSE ANESTHETIST, CERTIFIED REGISTERED

## 2017-08-16 PROCEDURE — D9220A PRA ANESTHESIA: Mod: CRNA,,, | Performed by: NURSE ANESTHETIST, CERTIFIED REGISTERED

## 2017-08-16 PROCEDURE — 71000033 HC RECOVERY, INTIAL HOUR: Performed by: SURGERY

## 2017-08-16 PROCEDURE — 25000003 PHARM REV CODE 250: Performed by: ANESTHESIOLOGY

## 2017-08-16 PROCEDURE — 63600175 PHARM REV CODE 636 W HCPCS: Performed by: NURSE ANESTHETIST, CERTIFIED REGISTERED

## 2017-08-16 PROCEDURE — 71000015 HC POSTOP RECOV 1ST HR: Performed by: SURGERY

## 2017-08-16 PROCEDURE — 77001 FLUOROGUIDE FOR VEIN DEVICE: CPT | Mod: 26,,, | Performed by: SURGERY

## 2017-08-16 PROCEDURE — 36000706: Performed by: SURGERY

## 2017-08-16 PROCEDURE — 36000707: Performed by: SURGERY

## 2017-08-16 PROCEDURE — 37000008 HC ANESTHESIA 1ST 15 MINUTES: Performed by: SURGERY

## 2017-08-16 DEVICE — KIT POWERPORT SINGLE 8FR: Type: IMPLANTABLE DEVICE | Site: CHEST  WALL | Status: FUNCTIONAL

## 2017-08-16 RX ORDER — FENTANYL CITRATE 50 UG/ML
INJECTION, SOLUTION INTRAMUSCULAR; INTRAVENOUS
Status: DISCONTINUED | OUTPATIENT
Start: 2017-08-16 | End: 2017-08-16

## 2017-08-16 RX ORDER — MIDAZOLAM HYDROCHLORIDE 1 MG/ML
INJECTION INTRAMUSCULAR; INTRAVENOUS
Status: DISCONTINUED | OUTPATIENT
Start: 2017-08-16 | End: 2017-08-16

## 2017-08-16 RX ORDER — FENTANYL CITRATE 50 UG/ML
25 INJECTION, SOLUTION INTRAMUSCULAR; INTRAVENOUS EVERY 5 MIN PRN
Status: DISCONTINUED | OUTPATIENT
Start: 2017-08-16 | End: 2017-08-16 | Stop reason: HOSPADM

## 2017-08-16 RX ORDER — SODIUM CHLORIDE, SODIUM LACTATE, POTASSIUM CHLORIDE, CALCIUM CHLORIDE 600; 310; 30; 20 MG/100ML; MG/100ML; MG/100ML; MG/100ML
INJECTION, SOLUTION INTRAVENOUS CONTINUOUS
Status: DISCONTINUED | OUTPATIENT
Start: 2017-08-16 | End: 2017-08-16 | Stop reason: HOSPADM

## 2017-08-16 RX ORDER — DEXAMETHASONE SODIUM PHOSPHATE 4 MG/ML
INJECTION, SOLUTION INTRA-ARTICULAR; INTRALESIONAL; INTRAMUSCULAR; INTRAVENOUS; SOFT TISSUE
Status: DISCONTINUED | OUTPATIENT
Start: 2017-08-16 | End: 2017-08-16

## 2017-08-16 RX ORDER — KETAMINE HYDROCHLORIDE 100 MG/ML
INJECTION, SOLUTION INTRAMUSCULAR; INTRAVENOUS
Status: DISCONTINUED | OUTPATIENT
Start: 2017-08-16 | End: 2017-08-16

## 2017-08-16 RX ORDER — BUPIVACAINE HYDROCHLORIDE AND EPINEPHRINE 2.5; 5 MG/ML; UG/ML
INJECTION, SOLUTION EPIDURAL; INFILTRATION; INTRACAUDAL; PERINEURAL
Status: DISCONTINUED | OUTPATIENT
Start: 2017-08-16 | End: 2017-08-16 | Stop reason: HOSPADM

## 2017-08-16 RX ORDER — PHENYLEPHRINE HYDROCHLORIDE 10 MG/ML
INJECTION INTRAVENOUS
Status: DISCONTINUED | OUTPATIENT
Start: 2017-08-16 | End: 2017-08-16

## 2017-08-16 RX ORDER — ONDANSETRON HYDROCHLORIDE 2 MG/ML
INJECTION, SOLUTION INTRAMUSCULAR; INTRAVENOUS
Status: DISCONTINUED | OUTPATIENT
Start: 2017-08-16 | End: 2017-08-16

## 2017-08-16 RX ORDER — SODIUM CHLORIDE 9 MG/ML
INJECTION, SOLUTION INTRAVENOUS CONTINUOUS
Status: DISCONTINUED | OUTPATIENT
Start: 2017-08-16 | End: 2017-08-16 | Stop reason: HOSPADM

## 2017-08-16 RX ORDER — CEFAZOLIN SODIUM 1 G/3ML
INJECTION, POWDER, FOR SOLUTION INTRAMUSCULAR; INTRAVENOUS
Status: DISCONTINUED | OUTPATIENT
Start: 2017-08-16 | End: 2017-08-16

## 2017-08-16 RX ORDER — ONDANSETRON 2 MG/ML
4 INJECTION INTRAMUSCULAR; INTRAVENOUS ONCE AS NEEDED
Status: DISCONTINUED | OUTPATIENT
Start: 2017-08-16 | End: 2017-08-16 | Stop reason: HOSPADM

## 2017-08-16 RX ORDER — LIDOCAINE HYDROCHLORIDE 10 MG/ML
1 INJECTION, SOLUTION EPIDURAL; INFILTRATION; INTRACAUDAL; PERINEURAL ONCE
Status: COMPLETED | OUTPATIENT
Start: 2017-08-16 | End: 2017-08-16

## 2017-08-16 RX ORDER — OXYCODONE HYDROCHLORIDE 5 MG/1
5 TABLET ORAL ONCE AS NEEDED
Status: COMPLETED | OUTPATIENT
Start: 2017-08-16 | End: 2017-08-16

## 2017-08-16 RX ORDER — HEPARIN SODIUM,PORCINE/PF 10 UNIT/ML
SYRINGE (ML) INTRAVENOUS
Status: DISCONTINUED | OUTPATIENT
Start: 2017-08-16 | End: 2017-08-16 | Stop reason: HOSPADM

## 2017-08-16 RX ORDER — GLYCOPYRROLATE 0.2 MG/ML
INJECTION INTRAMUSCULAR; INTRAVENOUS
Status: DISCONTINUED | OUTPATIENT
Start: 2017-08-16 | End: 2017-08-16

## 2017-08-16 RX ORDER — LIDOCAINE HCL/PF 100 MG/5ML
SYRINGE (ML) INTRAVENOUS
Status: DISCONTINUED | OUTPATIENT
Start: 2017-08-16 | End: 2017-08-16

## 2017-08-16 RX ORDER — HYDROCODONE BITARTRATE AND ACETAMINOPHEN 5; 325 MG/1; MG/1
1 TABLET ORAL EVERY 4 HOURS PRN
Status: DISCONTINUED | OUTPATIENT
Start: 2017-08-16 | End: 2017-08-16 | Stop reason: HOSPADM

## 2017-08-16 RX ORDER — CEFAZOLIN SODIUM 2 G/50ML
2 SOLUTION INTRAVENOUS
Status: DISCONTINUED | OUTPATIENT
Start: 2017-08-16 | End: 2017-08-16 | Stop reason: HOSPADM

## 2017-08-16 RX ORDER — PROPOFOL 10 MG/ML
VIAL (ML) INTRAVENOUS
Status: DISCONTINUED | OUTPATIENT
Start: 2017-08-16 | End: 2017-08-16

## 2017-08-16 RX ADMIN — LIDOCAINE HYDROCHLORIDE: 10 INJECTION, SOLUTION EPIDURAL; INFILTRATION; INTRACAUDAL; PERINEURAL at 11:08

## 2017-08-16 RX ADMIN — BUPIVACAINE HYDROCHLORIDE AND EPINEPHRINE BITARTRATE 30 ML: 2.5; .0091 INJECTION, SOLUTION EPIDURAL; INFILTRATION; INTRACAUDAL; PERINEURAL at 12:08

## 2017-08-16 RX ADMIN — DEXAMETHASONE SODIUM PHOSPHATE 4 MG: 4 INJECTION, SOLUTION INTRAMUSCULAR; INTRAVENOUS at 12:08

## 2017-08-16 RX ADMIN — FENTANYL CITRATE 25 MCG: 50 INJECTION, SOLUTION INTRAMUSCULAR; INTRAVENOUS at 12:08

## 2017-08-16 RX ADMIN — Medication 20 ML: at 12:08

## 2017-08-16 RX ADMIN — MIDAZOLAM HYDROCHLORIDE 1 MG: 1 INJECTION, SOLUTION INTRAMUSCULAR; INTRAVENOUS at 12:08

## 2017-08-16 RX ADMIN — SODIUM CHLORIDE, SODIUM LACTATE, POTASSIUM CHLORIDE, AND CALCIUM CHLORIDE: .6; .31; .03; .02 INJECTION, SOLUTION INTRAVENOUS at 11:08

## 2017-08-16 RX ADMIN — LIDOCAINE HYDROCHLORIDE 50 MG: 20 INJECTION, SOLUTION INTRAVENOUS at 12:08

## 2017-08-16 RX ADMIN — KETAMINE HYDROCHLORIDE 25 MG: 100 INJECTION, SOLUTION, CONCENTRATE INTRAMUSCULAR; INTRAVENOUS at 12:08

## 2017-08-16 RX ADMIN — FENTANYL CITRATE 50 MCG: 50 INJECTION, SOLUTION INTRAMUSCULAR; INTRAVENOUS at 12:08

## 2017-08-16 RX ADMIN — GLYCOPYRROLATE 0.2 MG: 0.2 INJECTION, SOLUTION INTRAMUSCULAR; INTRAVENOUS at 12:08

## 2017-08-16 RX ADMIN — PROPOFOL 120 MG: 10 INJECTION, EMULSION INTRAVENOUS at 12:08

## 2017-08-16 RX ADMIN — OXYCODONE HYDROCHLORIDE 5 MG: 5 TABLET ORAL at 02:08

## 2017-08-16 RX ADMIN — PHENYLEPHRINE HYDROCHLORIDE 80 MCG: 10 INJECTION INTRAVENOUS at 12:08

## 2017-08-16 RX ADMIN — CEFAZOLIN 2 G: 1 INJECTION, POWDER, FOR SOLUTION INTRAVENOUS at 12:08

## 2017-08-16 RX ADMIN — ONDANSETRON 4 MG: 2 INJECTION, SOLUTION INTRAMUSCULAR; INTRAVENOUS at 12:08

## 2017-08-16 NOTE — TELEPHONE ENCOUNTER
----- Message from Mony Rutherford LPN sent at 8/16/2017  3:26 PM CDT -----  Hi.  Dr. Alina Brothers (Hem/Onc) wants to refer this pt to [facility].  I called [facility] to get her an appt ASAP and they said that her insurance requires a referral from the PCP, that they wouldn't accept it from Dr. Brothers.  So, I was not able to get her into [facility].  Could you please send a referral on this pt to [facility] ASAP, so she can get in with them?  Thank you very much!  Dr. Alina Brothers's office

## 2017-08-16 NOTE — ANESTHESIA POSTPROCEDURE EVALUATION
"Anesthesia Post Evaluation    Patient: Amber Sharp    Procedure(s) Performed: Procedure(s) (LRB):  [id number] (N/A)    Final Anesthesia Type: general  Patient location during evaluation: PACU  Patient participation: Yes- Able to Participate  Level of consciousness: awake and alert  Post-procedure vital signs: reviewed and stable  Pain management: adequate  Airway patency: patent  PONV status at discharge: No PONV  Anesthetic complications: no      Cardiovascular status: blood pressure returned to baseline and hemodynamically stable  Respiratory status: unassisted  Hydration status: euvolemic  Follow-up not needed.        Visit Vitals  /63 (BP Location: Left arm, Patient Position: Lying)   Pulse 76   Temp 37.1 °C (98.8 °F) (Other (see comments))   Resp 16   Ht 5' 5" (1.651 m)   Wt 61.7 kg (136 lb)   SpO2 98%   Breastfeeding? No   BMI 22.63 kg/m²       Pain/Gurwinder Score: Presence of Pain: denies (8/16/2017 11:14 AM)      "

## 2017-08-16 NOTE — DISCHARGE SUMMARY
Discharge Summary  General Surgery      Admit Date: 8/16/2017    Discharge Date :8/16/2017    Attending Physician: Benjy Lindo     Discharge Physician: Benjy Lindo    Discharged Condition: good    Discharge Diagnosis: Malignant neoplasm of hilus of lung, unspecified laterality [C34.00]    Treatments/Procedures: Procedure(s) (LRB):  [id number] (N/A)    Hospital Course: Uneventful; Discharged home from Recovery    Significant Diagnostic Studies: none    Disposition: Home or Self Care    Diet: Regular    Follow up: Office 10-14 days    Activity: No heavy lifting till seen in office.    Patient Instructions:   Current Discharge Medication List      CONTINUE these medications which have NOT CHANGED    Details   alendronate (FOSAMAX) 70 MG tablet TAKE ONE TABLET BY MOUTH ONCE DAILY EVERY 7 days  Qty: 4 tablet, Refills: 11    Comments: This prescription was filled today(10/31/2016). Any refills authorized will be placed on file.      amlodipine (NORVASC) 10 MG tablet TAKE ONE TABLET BY MOUTH ONCE DAILY  Qty: 30 tablet, Refills: 5      aspirin (ECOTRIN) 81 MG EC tablet Take 81 mg by mouth once daily.        atorvastatin (LIPITOR) 40 MG tablet TAKE ONE TABLET BY MOUTH ONCE DAILY  Qty: 90 tablet, Refills: 3    Comments: This prescription was filled today(1/30/2017). Any refills authorized will be placed on file.      azelastine (ASTELIN) 137 mcg (0.1 %) nasal spray 1 spray (137 mcg total) by Nasal route 2 (two) times daily.  Qty: 30 mL, Refills: 11      blood sugar diagnostic Strp 1 strip by Misc.(Non-Drug; Combo Route) route 2 (two) times daily.  Qty: 100 strip, Refills: 3      blood-glucose meter kit Use as instructed  Qty: 1 each, Refills: 0      citalopram (CELEXA) 20 MG tablet TAKE ONE TABLET BY MOUTH EVERY DAY  Qty: 30 tablet, Refills: 11    Comments: This prescription was filled today(5/30/2017). Any refills authorized will be placed on file.      clopidogrel (PLAVIX) 75 mg tablet TAKE ONE TABLET BY  MOUTH ONCE DAILY  Qty: 30 tablet, Refills: 11    Comments: This prescription was filled today(5/30/2017). Any refills authorized will be placed on file.      cyanocobalamin, vitamin B-12, (VITAMIN B-12) 2,500 mcg Subl Place 2,500 mcg under the tongue 4 (four) times daily.      fenofibrate 160 MG Tab TAKE ONE TABLET BY MOUTH ONCE DAILY  Qty: 30 tablet, Refills: 5      fluticasone (FLONASE) 50 mcg/actuation nasal spray 2 sprays by Each Nare route once daily.  Qty: 16 g, Refills: 11      gabapentin (NEURONTIN) 300 MG capsule TAKE ONE CAPSULE BY MOUTH twice daily  Qty: 60 capsule, Refills: 11    Comments: This prescription was filled today(5/30/2017). Any refills authorized will be placed on file.      hydrocortisone 2.5 % cream Apply topically 2 (two) times daily.      ketoconazole (NIZORAL) 2 % cream APPLY TO THE AFFECTED AREA twice daily use with desonide  Refills: 5      lancets Misc 1 Units by Misc.(Non-Drug; Combo Route) route 3 (three) times daily.  Qty: 100 each, Refills: 3    Associated Diagnoses: Type II or unspecified type diabetes mellitus with neurological manifestations, not stated as uncontrolled      lidocaine-prilocaine (EMLA) cream Apply to affected area once  Qty: 5 g, Refills: 0    Associated Diagnoses: Malignant neoplasm of hilus of lung, unspecified laterality      lorazepam (ATIVAN) 1 MG tablet TAKE ONE TABLET BY MOUTH EVERY 8 HOURS AS NEEDED  Qty: 90 tablet, Refills: 5    Associated Diagnoses: Bipolar affective disorder      metformin (GLUCOPHAGE) 500 MG tablet TAKE ONE TABLET BY MOUTH TWICE DAILY WITH FOOD  Qty: 60 tablet, Refills: 11    Comments: This prescription was filled today(5/30/2017). Any refills authorized will be placed on file.      metoprolol succinate (TOPROL-XL) 25 MG 24 hr tablet TAKE ONE TABLET BY MOUTH ONCE DAILY  Qty: 30 tablet, Refills: 5    Comments: This prescription was filled today(3/30/2017). Any refills authorized will be placed on file.      mometasone 0.1% (ELOCON)  0.1 % cream APPLY TO THE AFFECTED AREA twice daily. use with econazole cream as directed  Refills: 5      quetiapine (SEROQUEL) 100 MG Tab TAKE ONE TABLET BY MOUTH ONCE DAILY IN THE EVENING  Qty: 30 tablet, Refills: 5    Comments: This prescription was filled today(3/1/2017). Any refills authorized will be placed on file.  Associated Diagnoses: Bipolar affective disorder      SYMBICORT 160-4.5 mcg/actuation HFAA inhale TWO puffs BY MOUTH and into lungs TWICE DAILY  Qty: 10.2 g, Refills: 0    Associated Diagnoses: Chronic obstructive pulmonary disease, unspecified COPD type      triamcinolone acetonide 0.1% (KENALOG) 0.1 % ointment Apply topically 2 (two) times daily. Avoid face, axillae and groin.      albuterol (PROVENTIL HFA) 90 mcg/actuation inhaler Inhale 2 puffs into the lungs every 6 (six) hours as needed.  Qty: 54 g, Refills: 3    Associated Diagnoses: Chronic obstructive pulmonary disease, unspecified COPD type      cyanocobalamin 1,000 mcg/mL injection Inject 1 mL (1,000 mcg total) into the skin every 14 (fourteen) days.  Qty: 30 mL, Refills: 5    Associated Diagnoses: B12 deficiency      hydrocodone-acetaminophen 5-325mg (NORCO) 5-325 mg per tablet Take 1 tablet by mouth every 4 (four) hours as needed for Pain.  Qty: 60 tablet, Refills: 0    Associated Diagnoses: Pain      hydrocodone-chlorpheniramine (TUSSIONEX) 10-8 mg/5 mL suspension Take 5 mLs by mouth every 12 (twelve) hours as needed for Cough.  Qty: 115 mL, Refills: 0    Associated Diagnoses: Cough      mouthwashes Soln PHARMACIST:  MIX 50mL Benadryl Elixir; 50mL Viscous Lidocaine; 50mL Nystatin Suspension; 50mL Milk of Magnesia  PATIENT:  Take 5mL by mouth - Swish and swallow - every 4 hours as needed for mouth/throat pain.  Qty: 200 mL, Refills: 1    Associated Diagnoses: Malignant neoplasm of hilus of lung, unspecified laterality      ondansetron (ZOFRAN-ODT) 8 MG TbDL Take 1 tablet (8 mg total) by mouth every 12 (twelve) hours as needed.  Qty:  30 tablet, Refills: 1    Associated Diagnoses: Malignant neoplasm of hilus of lung, unspecified laterality      prochlorperazine (COMPAZINE) 10 MG tablet Take 1 tablet (10 mg total) by mouth every 6 (six) hours as needed.  Qty: 30 tablet, Refills: 1    Associated Diagnoses: Malignant neoplasm of hilus of lung, unspecified laterality               Discharge Procedure Orders  Diet general

## 2017-08-16 NOTE — TRANSFER OF CARE
"Anesthesia Transfer of Care Note    Patient: Amber Sharp    Procedure(s) Performed: Procedure(s) (LRB):  [id number] (N/A)    Patient location: PACU    Anesthesia Type: general    Transport from OR: Transported from OR on 2-3 L/min O2 by NC with adequate spontaneous ventilation    Post pain: adequate analgesia    Post assessment: no apparent anesthetic complications and tolerated procedure well    Post vital signs: stable    Level of consciousness: responds to stimulation and sedated    Nausea/Vomiting: no nausea/vomiting    Complications: none    Transfer of care protocol was followed      Last vitals:   Visit Vitals  /63 (BP Location: Left arm, Patient Position: Lying)   Pulse 76   Temp 37.1 °C (98.8 °F) (Other (see comments))   Resp 16   Ht 5' 5" (1.651 m)   Wt 61.7 kg (136 lb)   SpO2 98%   Breastfeeding? No   BMI 22.63 kg/m²     "

## 2017-08-16 NOTE — OP NOTE
PATIENT NAME:  Amber Sharp     DATE OF PROCEDURE: 8/16/2017    PROCEDURE:  Port-A-Cath placement with Fluoroscopy.    PREOPERATIVE DIAGNOSIS:  Lung cancer    POSTOPERATIVE DIAGNOSIS:  Same    SURGEON:  Benjy Lindo M.D.  ASSISTANT: none    DESCRIPTION OF PROCEDURE:  After appropriate consent was obtained, consent forms   signed and questions answered, the patient was taken to the operating room and   placed on the operating room table where general anesthesia was applied.    Preoperative antibiotics were administered.  Timeout procedure was performed.    SCDs were in place.  The chest and neck were prepped and draped in the usual   sterile fashion.  The patient was placed in Trendelenburg and the right   subclavian vein was cannulated via the Seldinger technique.  The wire was   confirmed in position under fluoroscopy.  A skin incision was then made and a   subcutaneous pocket was created.  The port was assembled and then secured within   the pocket with 3-0 Prolene suture.  The catheter was then tunneled to the   insertion site and cut to the appropriate length.  The dilator and introducer   were then inserted over the wire under fluoroscopy and the catheter was inserted   through the introducer sheath, which was peeled away.  The port flushed and   aspirated easily after placement.  It was in good position under fluoroscopy.    Subcutaneous tissues were reapproximated with a running 3-0 Vicryl suture.    Then, 0.25% Marcaine was injected for local anesthetic.  Skin was then closed   with 4-0 Monocryl subcuticular stitches.  Steri-Strips were applied. The patient was awakened and   transferred to the recovery room in a stable condition.  She tolerated the   procedure without complication. Post procedure chest xray was ordered in the recovery room.  Counts were correct at the end of the procedure.    EBL: 5 cc  Specimen: none  Complications: None

## 2017-08-16 NOTE — OR NURSING
Consent not received yesterday from office. Lyric to IM office now and ask them to fax consent to DSU.

## 2017-08-16 NOTE — DISCHARGE INSTRUCTIONS
General Information:    1.  Do not drink alcoholic beverages including beer for 24 hours or as long as you are on pain medication..  2.  Do not drive a motor vehicle, operate machinery or power tools, or signs legal papers for 24 hours or as long as you are on pain medication.   3.  You may experience light-headedness, dizziness, and sleepiness following surgery. Please do not stay alone. A responsible adult should be with you for this 24 hour period.  4.  Go home and rest.    5. Progress slowly to a normal diet unless instructed.  Otherwise, begin with liquids such as soft drinks, then soup and crackers working up to solid foods. Drink plenty of nonalcoholic fluids.  6.  Certain anesthetics and pain medications produce nausea and vomiting in certain       individuals. If nausea becomes a problem at home, call you doctor.    7. A nurse will be calling you sometime after surgery. Do not be alarmed. This is our way of finding out how you are doing.    8. Several times every hour while you are awake, take 2-3 deep breaths and cough. If you had stomach surgery hold a pillow or rolled towel firmly against your stomach before you cough. This will help with any pain the cough might cause.  9. Several times every hour while you are awake, pump and flex your feet 5-6 times and do foot circles. This will help prevent blood clots.    10.Call your doctor for severe pain, bleeding, fever, or signs or symptoms of infection (pain, swelling, redness, foul odor, drainage).    Discharge Instructions: After Your Surgery/Procedure  Youve just had surgery. During surgery you were given medicine called anesthesia to keep you relaxed and free of pain. After surgery you may have some pain or nausea. This is common. Here are some tips for feeling better and getting well after surgery.     Stay on schedule with your medication.   Going home  Your doctor or nurse will show you how to take care of yourself when you go home. He or she will  "also answer your questions. Have an adult family member or friend drive you home.      For your safety we recommend these precaution for the first 24 hours after your procedure:  · Do not drive or use heavy equipment.  · Do not make important decisions or sign legal papers.  · Do not drink alcohol.  · Have someone stay with you, if needed. He or she can watch for problems and help keep you safe.  · Your concentration, balance, coordination, and judgement may be impaired for many hours after anesthesia.  Use caution when ambulating or standing up.     · You may feel weak and "washed out" after anesthesia and surgery.      Subtle residual effects of general anesthesia or sedation with regional / local anesthesia can last more than 24 hours.  Rest for the remainder of the day or longer if your Doctor/Surgeon has advised you to do so.  Although you may feel normal within the first 24 hours, your reflexes and mental ability may be impaired without you realizing it.  You may feel dizzy, lightheaded or sleepy for 24 hours or longer.      Be sure to go to all follow-up visits with your doctor. And rest after your surgery for as long as your doctor tells you to.  Coping with pain  If you have pain after surgery, pain medicine will help you feel better. Take it as told, before pain becomes severe. Also, ask your doctor or pharmacist about other ways to control pain. This might be with heat, ice, or relaxation. And follow any other instructions your surgeon or nurse gives you.  Tips for taking pain medicine  To get the best relief possible, remember these points:  · Pain medicines can upset your stomach. Taking them with a little food may help.  · Most pain relievers taken by mouth need at least 20 to 30 minutes to start to work.  · Taking medicine on a schedule can help you remember to take it. Try to time your medicine so that you can take it before starting an activity. This might be before you get dressed, go for a walk, " or sit down for dinner.  · Constipation is a common side effect of pain medicines. Call your doctor before taking any medicines such as laxatives or stool softeners to help ease constipation. Also ask if you should skip any foods. Drinking lots of fluids and eating foods such as fruits and vegetables that are high in fiber can also help. Remember, do not take laxatives unless your surgeon has prescribed them.  · Drinking alcohol and taking pain medicine can cause dizziness and slow your breathing. It can even be deadly. Do not drink alcohol while taking pain medicine.  · Pain medicine can make you react more slowly to things. Do not drive or run machinery while taking pain medicine.  Your health care provider may tell you to take acetaminophen to help ease your pain. Ask him or her how much you are supposed to take each day. Acetaminophen or other pain relievers may interact with your prescription medicines or other over-the-counter (OTC) drugs. Some prescription medicines have acetaminophen and other ingredients. Using both prescription and OTC acetaminophen for pain can cause you to overdose. Read the labels on your OTC medicines with care. This will help you to clearly know the list of ingredients, how much to take, and any warnings. It may also help you not take too much acetaminophen. If you have questions or do not understand the information, ask your pharmacist or health care provider to explain it to you before you take the OTC medicine.  Managing nausea  Some people have an upset stomach after surgery. This is often because of anesthesia, pain, or pain medicine, or the stress of surgery. These tips will help you handle nausea and eat healthy foods as you get better. If you were on a special food plan before surgery, ask your doctor if you should follow it while you get better. These tips may help:  · Do not push yourself to eat. Your body will tell you when to eat and how much.  · Start off with clear  liquids and soup. They are easier to digest.  · Next try semi-solid foods, such as mashed potatoes, applesauce, and gelatin, as you feel ready.  · Slowly move to solid foods. Dont eat fatty, rich, or spicy foods at first.  · Do not force yourself to have 3 large meals a day. Instead eat smaller amounts more often.  · Take pain medicines with a small amount of solid food, such as crackers or toast, to avoid nausea.     Call your surgeon if  · You still have pain an hour after taking medicine. The medicine may not be strong enough.  · You feel too sleepy, dizzy, or groggy. The medicine may be too strong.  · You have side effects like nausea, vomiting, or skin changes, such as rash, itching, or hives.       If you have obstructive sleep apnea  You were given anesthesia medicine during surgery to keep you comfortable and free of pain. After surgery, you may have more apnea spells because of this medicine and other medicines you were given. The spells may last longer than usual.   At home:  · Keep using the continuous positive airway pressure (CPAP) device when you sleep. Unless your health care provider tells you not to, use it when you sleep, day or night. CPAP is a common device used to treat obstructive sleep apnea.  · Talk with your provider before taking any pain medicine, muscle relaxants, or sedatives. Your provider will tell you about the possible dangers of taking these medicines.  © 9563-6431 [employer] [address] [place] [address]. All rights reserved. This information is not intended as a substitute for professional medical care. Always follow your healthcare professional's instructions.  Using Opioids for Pain Management     Your doctor has given instructions for you to take an opioid.  This is a drug for bad pain.  It helps control pain without causing bleeding and kidney problems.  Common opioid names are morphine, hydromorphone, oxycodone, and methadone. These drugs are  called narcotics.    There are several safety concerns you need to know.     · It is against the law to give or sell this drug to another person.  You must keep this medicine safely locked.    · You may have side effects from taking this medication.  These include nausea, itching, sweating, sleepiness, a change in your ability to breathe, and depression.  · Do not take alcohol or sleeping pills opioids.    · Long-term opoid use may no longer giver you relief from pain.  It can cause you stomach pain, mental anxiety, and headaches.  Long-term opoid use can potentially lead to unlawful street drug abuse and reduce your ability to stay employed.    · Your body may become opioid tolerant if you need to take more to get relief.    · You must stop taking opioids if you begin having more pain as a result of the medicine.    · Opioid withdrawal occurs when you have to stop taking the drug.  It can cause you to have nausea, vomiting, diarrhea, stomach pain, anxiety, and dilated pupils in your eyes. This condition means you are opioid dependent.    · Addiction is a drug induced brain disease. It means there are changes in how your brain is working.  Children, teens, and young adults under 25 years old are more likely to get addicted to opioids.      · Addiction can happen with repeated opioid use.  It does not happen with short-term use of two weeks or less.       For more information, please speak with your doctor or pharmacist.    Vascular Access Port Implantation   Port implantation is surgery to place (implant) a port under the skin. For vascular access, it is placed into a vein. The port allows medicines or nutrition to be sent right into your bloodstream. Blood can also be taken or given through the port. During the procedure, a long, thin tube called a catheter is threaded into one of your large veins. The tube is then attached to the port. This usually sits under the skin of your chest and causes a small bump. To use  the port, a special needle is passed through your skin and into the port. The needle can stay in your skin for up to 7 days, if needed. A port can stay in place for weeks or months or longer.    Why is a vascular access port needed?  A vascular access port may allow healthcare providers to give you:  · Chemotherapy or other cancer-fighting drugs  · IV treatments, such as antibiotics or nutrition  · Hemodialysis (for kidney failure)  The port may also be used to draw blood.  Before the procedure  Follow any instructions you are given on how to prepare.  Tell your provider about any medicines you are taking. This includes:  · All prescription medicines  · Over-the-counter medicines such as aspirin or ibuprofen  · Herbs, vitamins, and other supplements  Also be sure your provider knows:  · If you are pregnant or think you may be pregnant  · If you are allergic to any medicines or substances, especially local anesthetics or iodine  · Your full medical history, including why you will need the port  · If you plan on doing any contact sports  During the procedure  · Before the procedure, an IV may be put into a vein in your arm or hand. This gives you fluids and medicines. You may be given medicine through the IV to help you relax during the procedure. This is called sedation. But some surgeons place ports using general anesthesia.  · The chest is used most often for the port. In some cases, your belly (abdomen) or arm will be used instead.  · The skin over the insertion area is numbed with local anesthetic.  · Ultrasound or X-rays are used to help the healthcare provider guide the catheter into the proper location during the procedure.  · A cut (incision) is made in the skin where the port will be placed. A small pocket for the port is formed under the skin.  · A second small incision is made in the skin near the first incision. A tunnel under the skin is created. The catheter is put through the tunnel and into the blood  vessel.  · The skin is closed over the port. It is held shut with stitches (sutures) or surgical glue or tape. The second small incision is also closed.  · A chest X-ray may be done to make sure the port is placed properly.  After the procedure  You may be taken to a recovery room where youll recover from the sedation. Nurses will check on you as you rest. If you have pain, nurses can give you medicine. If you are not staying in the hospital overnight, you will be sent home a few hours after the procedure is done. A healthcare provider will tell you when you can go home. An adult family member or friend will need to drive you home.  Recovering at home  · Take pain medicine as directed by your healthcare provider.  · Take it easy for 24 hours after the procedure. Avoid physical activity and heavy lifting until your healthcare provider says its OK.  · Keep the port clean and dry. Ask when you can shower again. You will need to keep the port dry by covering it when you shower.  · Care for the insertion site as you are directed.  · Dont swim, bathe, or do other activities that cause water to cover the insertion site.  · To keep the port from getting blocked with blood clots, flush it as often as directed. You should be shown the proper way to flush the port before you go home. It is important to follow these directions.     Risks and possible complications of implantation  · Bleeding  · Infection of the insertion site  · Damage to a blood vessel  · Nerve injury or irritation  · Collapsed lung (for chest port placements)  · Skin breakdown over the port  Risks and possible complications of having a port  · Blocked  port or catheter  · Leakage or breakage of the port or catheter  · The port moves out of position  · Blood clot  · Skin or bloodstream infection  · Skin breakdown over the port      When to seek medical care  Call your healthcare provider right away if you have any of the following:  · A fever of 100.4°F  (38.0°C) or higher  · You can't access or use the port properly  · You can't flush the port or get a blood return  · The skin near the port is red, warm, swollen, or broken  · You have shoulder pain on the side where the port is located  · You feel a heart flutter or racing heart   · Swollen arm, if the port is placed in your arm   Date Last Reviewed: 7/1/2016  © 1001-2723 [employer]. [address] [place] [address]. All rights reserved. This information is not intended as a substitute for professional medical care. Always follow your healthcare professional's instructions.

## 2017-08-16 NOTE — TELEPHONE ENCOUNTER
Called [facility] and was transferred to the Thoracic New Pt Referral Dept and gave them all the required info on this pt and was told that [facility] is out of network for this pts insurance and therefore the pts PCP would have to send [facility] a referral that the referral cannot come from Dr. Brothers since she is a specialist.  All required medical records faxed to [facility] at [phone].  In basket message sent to Dr. Mike King and staff asking them to please send a referral to [facility].  Dr. Brothers notified that I was unable to get pt an appt with [facility] due to her ins being out of network.  Dr. Brothers had me call pts daughter and tell her to call pts ins company and ask them for a special extension for a one time evaluation with [facility] since it is out of network for pt.  Pts daughter verbalizes understanding and said she will call us after she calls ins company to let us know what they said.  Pts daughter verbalizes understanding.

## 2017-08-16 NOTE — OR NURSING
Per Dr. Lindo pt can make a post op appt as needed since she will be following up with the cancer center.

## 2017-08-16 NOTE — ANESTHESIA PREPROCEDURE EVALUATION
08/16/2017  Amber Sharp is a 66 y.o., female.    Anesthesia Evaluation    I have reviewed the Patient Summary Reports.    I have reviewed the Nursing Notes.      Review of Systems  Anesthesia Hx:  No problems with previous Anesthesia    Cardiovascular:   Hypertension, well controlled    Pulmonary:   COPD, mild Asthma Lung CA       Physical Exam  General:  Well nourished                 Anesthesia Plan  Type of Anesthesia, risks & benefits discussed:  Anesthesia Type:  general  Patient's Preference:   Intra-op Monitoring Plan:   Intra-op Monitoring Plan Comments:   Post Op Pain Control Plan:   Post Op Pain Control Plan Comments:   Induction:   IV  Beta Blocker:         Informed Consent: Patient understands risks and agrees with Anesthesia plan.  Questions answered. Anesthesia consent signed with patient.  ASA Score: 3     Day of Surgery Review of History & Physical:    H&P update referred to the surgeon.         Ready For Surgery From Anesthesia Perspective.

## 2017-08-17 ENCOUNTER — TELEPHONE (OUTPATIENT)
Dept: FAMILY MEDICINE | Facility: CLINIC | Age: 67
End: 2017-08-17

## 2017-08-17 VITALS
TEMPERATURE: 99 F | HEART RATE: 77 BPM | BODY MASS INDEX: 22.66 KG/M2 | SYSTOLIC BLOOD PRESSURE: 136 MMHG | RESPIRATION RATE: 18 BRPM | OXYGEN SATURATION: 98 % | HEIGHT: 65 IN | DIASTOLIC BLOOD PRESSURE: 64 MMHG | WEIGHT: 136 LBS

## 2017-08-17 DIAGNOSIS — R05.8 COUGH VARIANT NOT DUE TO ASTHMA: ICD-10-CM

## 2017-08-17 DIAGNOSIS — C34.90 LUNG CANCER, PRIMARY, WITH METASTASIS FROM LUNG TO OTHER SITE, UNSPECIFIED LATERALITY: Primary | ICD-10-CM

## 2017-08-17 DIAGNOSIS — R29.898 WEAKNESS OF EXTREMITY: ICD-10-CM

## 2017-08-17 DIAGNOSIS — R06.02 SOB (SHORTNESS OF BREATH): ICD-10-CM

## 2017-08-17 NOTE — TELEPHONE ENCOUNTER
I faxed all her paper work to [facility] and also faxed all her paper work to [facility] /office visit and test and refer

## 2017-08-17 NOTE — TELEPHONE ENCOUNTER
Patients daughter Larisa called and said that her mother has not received chemo yet and she cant breath and coughs to gasp for air.  She said that her mother is going down fast and wanted to let you know.

## 2017-08-17 NOTE — TELEPHONE ENCOUNTER
Patients daughter wants to know if she can have oxygen for her mother , she coughs so hard that she cant catch her breath, and short of breath , can she have oxygen?

## 2017-08-18 DIAGNOSIS — Z12.11 COLON CANCER SCREENING: ICD-10-CM

## 2017-08-18 NOTE — TELEPHONE ENCOUNTER
Called the patient to let them know about the insurance approval and denial ,we are doing a appeal to [facility] for a appeal for authorization and will see what they tell us next week   Told them we will follow up Monday on all the other referrals and get back with the family

## 2017-08-18 NOTE — TELEPHONE ENCOUNTER
----- Message from Chaka Cuellar sent at 8/18/2017  2:49 PM CDT -----  Contact: [facility]  Request for oxygen received from Larisa.  She needs to speak about making out some information. Please call [phone] thanks!

## 2017-08-18 NOTE — TELEPHONE ENCOUNTER
Called patient to come in the office to try and obtain a Oxygen test, she said she is so weak ,that she cant do it.  She could hardly speak she sounded very short of breath.  The daughter said she coughs and gasp for air, Dr King said the mass is so big that it is causing the Shortness of breath.

## 2017-08-21 ENCOUNTER — DOCUMENTATION ONLY (OUTPATIENT)
Dept: INFUSION THERAPY | Facility: HOSPITAL | Age: 67
End: 2017-08-21

## 2017-08-21 ENCOUNTER — TELEPHONE (OUTPATIENT)
Dept: FAMILY MEDICINE | Facility: CLINIC | Age: 67
End: 2017-08-21

## 2017-08-21 ENCOUNTER — INFUSION (OUTPATIENT)
Dept: INFUSION THERAPY | Facility: HOSPITAL | Age: 67
End: 2017-08-21
Attending: INTERNAL MEDICINE
Payer: MEDICARE

## 2017-08-21 ENCOUNTER — TELEPHONE (OUTPATIENT)
Dept: HEMATOLOGY/ONCOLOGY | Facility: CLINIC | Age: 67
End: 2017-08-21

## 2017-08-21 VITALS
DIASTOLIC BLOOD PRESSURE: 66 MMHG | RESPIRATION RATE: 18 BRPM | SYSTOLIC BLOOD PRESSURE: 108 MMHG | WEIGHT: 131.38 LBS | BODY MASS INDEX: 21.89 KG/M2 | HEART RATE: 95 BPM | HEIGHT: 65 IN | TEMPERATURE: 98 F

## 2017-08-21 DIAGNOSIS — C34.00 MALIGNANT NEOPLASM OF HILUS OF LUNG, UNSPECIFIED LATERALITY: Primary | ICD-10-CM

## 2017-08-21 DIAGNOSIS — F31.9 BIPOLAR AFFECTIVE DISORDER: ICD-10-CM

## 2017-08-21 PROCEDURE — 96415 CHEMO IV INFUSION ADDL HR: CPT | Mod: PN

## 2017-08-21 PROCEDURE — 96367 TX/PROPH/DG ADDL SEQ IV INF: CPT | Mod: PN

## 2017-08-21 PROCEDURE — S0028 INJECTION, FAMOTIDINE, 20 MG: HCPCS | Mod: PN | Performed by: NURSE PRACTITIONER

## 2017-08-21 PROCEDURE — 96413 CHEMO IV INFUSION 1 HR: CPT | Mod: PN

## 2017-08-21 PROCEDURE — 63600175 PHARM REV CODE 636 W HCPCS: Mod: PN | Performed by: NURSE PRACTITIONER

## 2017-08-21 PROCEDURE — 96417 CHEMO IV INFUS EACH ADDL SEQ: CPT | Mod: PN

## 2017-08-21 PROCEDURE — 25000003 PHARM REV CODE 250: Mod: PN | Performed by: NURSE PRACTITIONER

## 2017-08-21 PROCEDURE — 96377 APPLICATON ON-BODY INJECTOR: CPT | Mod: 59,PN

## 2017-08-21 RX ORDER — HEPARIN 100 UNIT/ML
500 SYRINGE INTRAVENOUS
Status: CANCELLED | OUTPATIENT
Start: 2017-08-21

## 2017-08-21 RX ORDER — QUETIAPINE FUMARATE 100 MG/1
TABLET, FILM COATED ORAL
Qty: 30 TABLET | Refills: 5 | Status: SHIPPED | OUTPATIENT
Start: 2017-08-21 | End: 2018-02-28 | Stop reason: SDUPTHER

## 2017-08-21 RX ORDER — SODIUM CHLORIDE 0.9 % (FLUSH) 0.9 %
10 SYRINGE (ML) INJECTION
Status: CANCELLED | OUTPATIENT
Start: 2017-08-21

## 2017-08-21 RX ORDER — FAMOTIDINE 20 MG/50ML
20 INJECTION, SOLUTION INTRAVENOUS
Status: CANCELLED
Start: 2017-08-21 | End: 2017-08-21

## 2017-08-21 RX ORDER — EPINEPHRINE 0.3 MG/.3ML
0.3 INJECTION SUBCUTANEOUS ONCE AS NEEDED
Status: CANCELLED | OUTPATIENT
Start: 2017-08-21 | End: 2017-08-21

## 2017-08-21 RX ORDER — HEPARIN 100 UNIT/ML
500 SYRINGE INTRAVENOUS
Status: DISCONTINUED | OUTPATIENT
Start: 2017-08-21 | End: 2017-08-21 | Stop reason: HOSPADM

## 2017-08-21 RX ORDER — FAMOTIDINE 20 MG/50ML
20 INJECTION, SOLUTION INTRAVENOUS
Status: COMPLETED | OUTPATIENT
Start: 2017-08-21 | End: 2017-08-21

## 2017-08-21 RX ORDER — SODIUM CHLORIDE 0.9 % (FLUSH) 0.9 %
10 SYRINGE (ML) INJECTION
Status: DISCONTINUED | OUTPATIENT
Start: 2017-08-21 | End: 2017-08-21 | Stop reason: HOSPADM

## 2017-08-21 RX ORDER — LORAZEPAM 1 MG/1
TABLET ORAL
Qty: 90 TABLET | Refills: 2 | Status: SHIPPED | OUTPATIENT
Start: 2017-08-21 | End: 2017-10-20 | Stop reason: SDUPTHER

## 2017-08-21 RX ORDER — DIPHENHYDRAMINE HYDROCHLORIDE 50 MG/ML
50 INJECTION INTRAMUSCULAR; INTRAVENOUS ONCE AS NEEDED
Status: CANCELLED | OUTPATIENT
Start: 2017-08-21 | End: 2017-08-21

## 2017-08-21 RX ADMIN — PACLITAXEL 288 MG: 6 INJECTION, SOLUTION, CONCENTRATE INTRAVENOUS at 11:08

## 2017-08-21 RX ADMIN — CARBOPLATIN 545 MG: 10 INJECTION, SOLUTION INTRAVENOUS at 02:08

## 2017-08-21 RX ADMIN — PEGFILGRASTIM 6 MG: KIT SUBCUTANEOUS at 03:08

## 2017-08-21 RX ADMIN — PALONOSETRON HYDROCHLORIDE: 0.25 INJECTION INTRAVENOUS at 10:08

## 2017-08-21 RX ADMIN — FAMOTIDINE 20 MG: 20 INJECTION, SOLUTION INTRAVENOUS at 10:08

## 2017-08-21 RX ADMIN — DIPHENHYDRAMINE HYDROCHLORIDE 50 MG: 50 INJECTION, SOLUTION INTRAMUSCULAR; INTRAVENOUS at 10:08

## 2017-08-21 NOTE — PROGRESS NOTES
Nutrition: Met with pt and pts  today for chemo new patient education while pt was having cycle 1. Pt informed me she has lost 40#s since the beginning of the year. Pt informed me her weight is continuing to decline. Pt reports having an appetite however she is unable to eat more than two bites at a sitting. Wt: 131# Recommended calories and protein: 1800kcals and 70grams. Discussed the importance of weight maintenance and nutrition during treatment, small frequent meals, protein rich diet, and consuming adequate hydrating liquids. Discussed bowel MGT. Discussed the importance food safety. Patient verbalized understanding of discussed information. Will follow up at cycle 2. Desirae Serra,MS, RD, LDN

## 2017-08-21 NOTE — TELEPHONE ENCOUNTER
----- Message from Isabella Barker sent at 8/21/2017 10:18 AM CDT -----  Contact: Madonna/[employer] [place]  Madonna is requesting to speak to the nurse regarding a home health order for pt. Pls call Madonna back at [phone].

## 2017-08-21 NOTE — PLAN OF CARE
Problem: Patient Care Overview (Adult)  Goal: Plan of Care Review  Pt tolerated treatment well

## 2017-08-21 NOTE — TELEPHONE ENCOUNTER
----- Message from Leila Rothman sent at 8/18/2017  4:26 PM CDT -----  Contact: evelin daughter  Evelin pt daughter states pt got denied [facility] by insurance so what's next...[phone]

## 2017-08-22 ENCOUNTER — TELEPHONE (OUTPATIENT)
Dept: HEMATOLOGY/ONCOLOGY | Facility: CLINIC | Age: 67
End: 2017-08-22

## 2017-08-22 NOTE — TELEPHONE ENCOUNTER
Called pt and spoke with pts daughter, Dane, and informed her that I did relay her message about pts insurance denying treatment at [facility] because it is out of network and Dr. Brothers said to just keep all Dr. Brothers appts and all chemo appts as scheduled.  Pts daughter verbalizes understanding.

## 2017-08-22 NOTE — TELEPHONE ENCOUNTER
----- Message from Alina Brothers MD sent at 8/22/2017  3:12 PM CDT -----  She keeps her chemo here as already planned

## 2017-08-22 NOTE — TELEPHONE ENCOUNTER
----- Message from Gareth Wood sent at 8/22/2017 12:52 PM CDT -----  Contact: Evelin Waters patient's daughter called requesting script of appetite increase.send to [facility]. please call back at  [phone] when script has been sent. Thanks,

## 2017-08-23 DIAGNOSIS — R63.0 APPETITE ABSENT: Primary | ICD-10-CM

## 2017-08-23 RX ORDER — MEGESTROL ACETATE 40 MG/ML
400 SUSPENSION ORAL DAILY
Qty: 240 ML | Refills: 2 | Status: SHIPPED | OUTPATIENT
Start: 2017-08-23 | End: 2018-08-23

## 2017-08-23 NOTE — TELEPHONE ENCOUNTER
Called pts daughter and informed her that Dr. Brothers sent an appetite stimulator to pts pharmacy.  Pts daughter verbalizes understanding.

## 2017-08-23 NOTE — TELEPHONE ENCOUNTER
----- Message from Alina Brothers MD sent at 8/23/2017 12:17 PM CDT -----  Appetite stimulant sent to [facility], let dasughter know

## 2017-08-24 ENCOUNTER — TELEPHONE (OUTPATIENT)
Dept: FAMILY MEDICINE | Facility: CLINIC | Age: 67
End: 2017-08-24

## 2017-08-24 NOTE — TELEPHONE ENCOUNTER
----- Message from Gareth Wood sent at 8/23/2017 11:32 AM CDT -----  Contact: Madonna Peacock with [employer] called regarding referral for home health have questions.please call back at [phone]. Thanks,

## 2017-08-25 DIAGNOSIS — C34.00 MALIGNANT NEOPLASM OF HILUS OF LUNG, UNSPECIFIED LATERALITY: Primary | ICD-10-CM

## 2017-08-25 RX ORDER — MORPHINE SULFATE 15 MG/1
15 TABLET, FILM COATED, EXTENDED RELEASE ORAL EVERY 8 HOURS PRN
Qty: 20 TABLET | Refills: 0 | Status: SHIPPED | OUTPATIENT
Start: 2017-08-25 | End: 2017-09-29

## 2017-09-05 ENCOUNTER — TELEPHONE (OUTPATIENT)
Dept: HEMATOLOGY/ONCOLOGY | Facility: CLINIC | Age: 67
End: 2017-09-05

## 2017-09-05 ENCOUNTER — OFFICE VISIT (OUTPATIENT)
Dept: HEMATOLOGY/ONCOLOGY | Facility: CLINIC | Age: 67
End: 2017-09-05
Payer: MEDICARE

## 2017-09-05 VITALS
BODY MASS INDEX: 21.45 KG/M2 | DIASTOLIC BLOOD PRESSURE: 52 MMHG | HEART RATE: 67 BPM | RESPIRATION RATE: 18 BRPM | HEIGHT: 65 IN | SYSTOLIC BLOOD PRESSURE: 108 MMHG | WEIGHT: 128.75 LBS | TEMPERATURE: 98 F

## 2017-09-05 DIAGNOSIS — E78.5 DYSLIPIDEMIA: ICD-10-CM

## 2017-09-05 DIAGNOSIS — I25.10 CORONARY ARTERY DISEASE INVOLVING NATIVE CORONARY ARTERY OF NATIVE HEART WITHOUT ANGINA PECTORIS: ICD-10-CM

## 2017-09-05 DIAGNOSIS — E08.00 DIABETES MELLITUS DUE TO UNDERLYING CONDITION WITH HYPEROSMOLARITY WITHOUT COMA, WITHOUT LONG-TERM CURRENT USE OF INSULIN: ICD-10-CM

## 2017-09-05 DIAGNOSIS — C50.011 MALIGNANT NEOPLASM OF NIPPLE OF RIGHT BREAST IN FEMALE, UNSPECIFIED ESTROGEN RECEPTOR STATUS: Primary | ICD-10-CM

## 2017-09-05 DIAGNOSIS — C34.00 MALIGNANT NEOPLASM OF HILUS OF LUNG, UNSPECIFIED LATERALITY: ICD-10-CM

## 2017-09-05 DIAGNOSIS — I10 ESSENTIAL HYPERTENSION: ICD-10-CM

## 2017-09-05 PROCEDURE — 99999 PR PBB SHADOW E&M-EST. PATIENT-LVL III: CPT | Mod: PBBFAC,,, | Performed by: INTERNAL MEDICINE

## 2017-09-05 PROCEDURE — 99499 UNLISTED E&M SERVICE: CPT | Mod: S$PBB,,, | Performed by: INTERNAL MEDICINE

## 2017-09-05 PROCEDURE — 3074F SYST BP LT 130 MM HG: CPT | Mod: S$GLB,,, | Performed by: INTERNAL MEDICINE

## 2017-09-05 PROCEDURE — 99214 OFFICE O/P EST MOD 30 MIN: CPT | Mod: S$GLB,,, | Performed by: INTERNAL MEDICINE

## 2017-09-05 PROCEDURE — 3078F DIAST BP <80 MM HG: CPT | Mod: S$GLB,,, | Performed by: INTERNAL MEDICINE

## 2017-09-05 PROCEDURE — 3008F BODY MASS INDEX DOCD: CPT | Mod: S$GLB,,, | Performed by: INTERNAL MEDICINE

## 2017-09-05 NOTE — TELEPHONE ENCOUNTER
----- Message from Vianey Varghese sent at 9/5/2017 12:25 PM CDT -----  Contact: Lottie lindsay/ [facility]  Lottie lindsay/ [facility] calling to speak with the Nurse about some new pain and swelling the patient is having. Please advise. Call to pod. Call connected to pod. Warm transferred.  Call back Lottie   Thanks!

## 2017-09-05 NOTE — TELEPHONE ENCOUNTER
----- Message from Beth Reaves, RT sent at 9/5/2017 10:29 AM CDT -----  Contact: EDSON Tafoya , [phone] [facility]  EDSON Tafoya , [phone] [facility], redness to her Rt axillary and pain / swelling and pain Lt lymphoid  near Lt ear, thanks.

## 2017-09-06 NOTE — PROGRESS NOTES
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  The patient has   started carb-taxol chemotherapy.  Previously, she has been treated for breast   cancer in 2007 received mitoxantrone and Cytoxan.  She also has known cardiac   disease, B12 deficiency, hypertension, diabetes, dyslipidemia, coronary artery   disease and depression.  The patient has had significant issues with tolerating   chemotherapy in the past and now makes an urgent visit today.  Her most recent   PET scan showed an SUV of 17.3 with central necrosis 7.4 x 6.1 cm mass, large   and cavitating left upper lobe extending into the apex of the left ventricle.    No other metastatic disease.  She also has lymph nodes that are hypermetabolic   in the prevascular anterior amber, left hilar aortopulmonary region.  The   patient states she had lots of difficulty with tolerating carbo-taxol.  She had   bone pain from Neulasta.  She did have a lumpy area that grew on the left side   with a rash under her right armpit.  Last week, her blood pressure dropped and   she became hypoxic, was sent to the Emergency Room, evaluated there and sent   back, home health nurse examined her armpit and felt that there were some lymph   nodes there and they made an urgent visit today.  Daughter is tearful again in   the clinic very stressed about mother's illness.    PHYSICAL EXAMINATION:  The patient is in a wheelchair, appears to have lost some weight.  She has had a   good appetite.  There is a healing rash to the right armpit, but no   lymphadenopathy noted.  There are no lymph nodes in either side of the cervical   area and no actual swelling noted.  The patient does have some amount of   temporal wasting.  She is alert, awake, oriented, cooperative to exam and   discussing clinic today well.  No labs today.  She does have an appointment for    Friday for ongoing treatment planning for starting next week with CBC, CMP.    Lengthy discussion today, I do not have much examination findings today to abort   or change treatments.  We will recheck on Friday with CBC and CMP for ongoing   therapy.  I have reassured the daughter and the patient that we need to carry on   with treatment as much as possible.  We will rescan after 4 cycles to look for   growth.  Currently, I feel no palpable adenopathy.  The patient will continue   care for diabetes.  Continue eating good nutritious foods and increase her   calorie intake.  Continue with hypertension, cardiac disease management.    The patient to start sublingual B12 instead of intramuscular and continue with   sublingual B12 and we will recheck this eventually.      SSS/HN  dd: 09/05/2017 15:06:44 (CDT)  td: 09/06/2017 09:43:35 (CDT)  Doc ID   #[id number]  Job ID #[id number]    CC:

## 2017-09-08 ENCOUNTER — LAB VISIT (OUTPATIENT)
Dept: LAB | Facility: HOSPITAL | Age: 67
End: 2017-09-08
Attending: INTERNAL MEDICINE
Payer: MEDICARE

## 2017-09-08 ENCOUNTER — OFFICE VISIT (OUTPATIENT)
Dept: HEMATOLOGY/ONCOLOGY | Facility: CLINIC | Age: 67
End: 2017-09-08
Payer: MEDICARE

## 2017-09-08 VITALS
SYSTOLIC BLOOD PRESSURE: 102 MMHG | HEIGHT: 65 IN | TEMPERATURE: 98 F | HEART RATE: 69 BPM | WEIGHT: 128.5 LBS | BODY MASS INDEX: 21.41 KG/M2 | DIASTOLIC BLOOD PRESSURE: 59 MMHG | RESPIRATION RATE: 16 BRPM

## 2017-09-08 DIAGNOSIS — Z95.1 S/P CABG (CORONARY ARTERY BYPASS GRAFT): ICD-10-CM

## 2017-09-08 DIAGNOSIS — D63.8 ANEMIA OF OTHER CHRONIC DISEASE: Primary | ICD-10-CM

## 2017-09-08 DIAGNOSIS — I25.10 CORONARY ARTERY DISEASE INVOLVING NATIVE CORONARY ARTERY OF NATIVE HEART WITHOUT ANGINA PECTORIS: ICD-10-CM

## 2017-09-08 DIAGNOSIS — E78.5 DYSLIPIDEMIA: ICD-10-CM

## 2017-09-08 DIAGNOSIS — E53.8 B12 DEFICIENCY: ICD-10-CM

## 2017-09-08 DIAGNOSIS — E08.00 DIABETES MELLITUS DUE TO UNDERLYING CONDITION WITH HYPEROSMOLARITY WITHOUT COMA, WITHOUT LONG-TERM CURRENT USE OF INSULIN: ICD-10-CM

## 2017-09-08 DIAGNOSIS — C34.00 MALIGNANT NEOPLASM OF HILUS OF LUNG, UNSPECIFIED LATERALITY: Primary | ICD-10-CM

## 2017-09-08 LAB
ALBUMIN SERPL BCP-MCNC: 3.3 G/DL
ALP SERPL-CCNC: 79 U/L
ALT SERPL W/O P-5'-P-CCNC: 22 U/L
ANION GAP SERPL CALC-SCNC: 10 MMOL/L
AST SERPL-CCNC: 34 U/L
BASOPHILS # BLD AUTO: 0.04 K/UL
BASOPHILS NFR BLD: 0.5 %
BILIRUB SERPL-MCNC: 0.5 MG/DL
BUN SERPL-MCNC: 9 MG/DL
CALCIUM SERPL-MCNC: 7.8 MG/DL
CHLORIDE SERPL-SCNC: 108 MMOL/L
CO2 SERPL-SCNC: 23 MMOL/L
CREAT SERPL-MCNC: 0.85 MG/DL
DIFFERENTIAL METHOD: ABNORMAL
EOSINOPHIL # BLD AUTO: 0.1 K/UL
EOSINOPHIL NFR BLD: 1.4 %
ERYTHROCYTE [DISTWIDTH] IN BLOOD BY AUTOMATED COUNT: 17.3 %
EST. GFR  (AFRICAN AMERICAN): >60 ML/MIN/1.73 M^2
EST. GFR  (NON AFRICAN AMERICAN): >60 ML/MIN/1.73 M^2
GLUCOSE SERPL-MCNC: 93 MG/DL
HCT VFR BLD AUTO: 27 %
HGB BLD-MCNC: 8.6 G/DL
LYMPHOCYTES # BLD AUTO: 3.3 K/UL
LYMPHOCYTES NFR BLD: 41.7 %
MCH RBC QN AUTO: 26.8 PG
MCHC RBC AUTO-ENTMCNC: 31.9 G/DL
MCV RBC AUTO: 84 FL
MONOCYTES # BLD AUTO: 1 K/UL
MONOCYTES NFR BLD: 12.1 %
NEUTROPHILS # BLD AUTO: 3.5 K/UL
NEUTROPHILS NFR BLD: 44.3 %
NRBC BLD-RTO: 0 /100 WBC
PLATELET # BLD AUTO: 278 K/UL
PMV BLD AUTO: 10.4 FL
POTASSIUM SERPL-SCNC: 4.1 MMOL/L
PROT SERPL-MCNC: 6.8 G/DL
RBC # BLD AUTO: 3.21 M/UL
SODIUM SERPL-SCNC: 141 MMOL/L
VIT B12 SERPL-MCNC: >1000 PG/ML
WBC # BLD AUTO: 7.93 K/UL

## 2017-09-08 PROCEDURE — 85025 COMPLETE CBC W/AUTO DIFF WBC: CPT

## 2017-09-08 PROCEDURE — 3008F BODY MASS INDEX DOCD: CPT | Mod: S$GLB,,, | Performed by: INTERNAL MEDICINE

## 2017-09-08 PROCEDURE — 85025 COMPLETE CBC W/AUTO DIFF WBC: CPT | Mod: PN

## 2017-09-08 PROCEDURE — 3074F SYST BP LT 130 MM HG: CPT | Mod: S$GLB,,, | Performed by: INTERNAL MEDICINE

## 2017-09-08 PROCEDURE — 99999 PR PBB SHADOW E&M-EST. PATIENT-LVL III: CPT | Mod: PBBFAC,,, | Performed by: INTERNAL MEDICINE

## 2017-09-08 PROCEDURE — 99215 OFFICE O/P EST HI 40 MIN: CPT | Mod: S$GLB,,, | Performed by: INTERNAL MEDICINE

## 2017-09-08 PROCEDURE — 82607 VITAMIN B-12: CPT

## 2017-09-08 PROCEDURE — 36415 COLL VENOUS BLD VENIPUNCTURE: CPT | Mod: PN

## 2017-09-08 PROCEDURE — 80053 COMPREHEN METABOLIC PANEL: CPT | Mod: PN

## 2017-09-08 PROCEDURE — 80053 COMPREHEN METABOLIC PANEL: CPT

## 2017-09-08 PROCEDURE — 99499 UNLISTED E&M SERVICE: CPT | Mod: S$PBB,,, | Performed by: INTERNAL MEDICINE

## 2017-09-08 PROCEDURE — 82607 VITAMIN B-12: CPT | Mod: PN

## 2017-09-08 PROCEDURE — 3078F DIAST BP <80 MM HG: CPT | Mod: S$GLB,,, | Performed by: INTERNAL MEDICINE

## 2017-09-08 RX ORDER — EPINEPHRINE 0.3 MG/.3ML
0.3 INJECTION SUBCUTANEOUS ONCE AS NEEDED
Status: CANCELLED | OUTPATIENT
Start: 2017-09-11 | End: 2017-09-11

## 2017-09-08 RX ORDER — HEPARIN 100 UNIT/ML
500 SYRINGE INTRAVENOUS
Status: CANCELLED | OUTPATIENT
Start: 2017-09-11

## 2017-09-08 RX ORDER — DIPHENHYDRAMINE HYDROCHLORIDE 50 MG/ML
50 INJECTION INTRAMUSCULAR; INTRAVENOUS ONCE AS NEEDED
Status: CANCELLED | OUTPATIENT
Start: 2017-09-11 | End: 2017-09-11

## 2017-09-08 RX ORDER — SODIUM CHLORIDE 0.9 % (FLUSH) 0.9 %
10 SYRINGE (ML) INJECTION
Status: CANCELLED | OUTPATIENT
Start: 2017-09-11

## 2017-09-08 RX ORDER — FAMOTIDINE 20 MG/50ML
20 INJECTION, SOLUTION INTRAVENOUS
Status: CANCELLED
Start: 2017-09-11 | End: 2017-09-11

## 2017-09-08 NOTE — PROGRESS NOTES
This is a 66-year-old  woman known to me.  The patient has been   following from a diagnosis of breast cancer in 2007.  She was treated at [facility].  At that time, mitoxantrone and Cytoxan was used because of significant   extensive coronary artery disease and peripheral vascular disease.  The patient   underwent double mastectomy, radiation therapy and adjuvant therapy all at   [facility], under the guidance of Dr. Downing.  She was seen by me for B12   deficiency, hypertension, diabetes, dyslipidemia, coronary artery disease and   depression, and at the last visit, she had complained of feeling more tired and   sleeping a lot, and had not had a recent follow up with Dr. Ha and hence, we   sent her back for cardiac evaluation.  The patient had imaging study done, an   echocardiogram that showed a mass that was wrapping around the heart.  This   raised immediate suspicion.  The patient had been seen by Dr. Hopkins in lieu of   Dr. Mclaughlin, and the patient's family practice doctor, Dr. King, was   contacted.  We asked for an immediate biopsy of this mass.  The films were   discussed with Dr. Martin.  Interventional radiology-guided biopsy was   recommended.  The patient was sent for biopsy with Dr. Connolly at [facility].  Pathology comes back as squamous cell CA.  Pt started carbo / taxol chemotherapy and made an interim visit at the beginning of this week because of multiple issue and side effects  PHYSICAL EXAMINATION:  Wt Readings from Last 3 Encounters:   09/08/17 58.3 kg (128 lb 8.5 oz)   09/05/17 58.4 kg (128 lb 12 oz)   08/31/17 60 kg (132 lb 4.4 oz)     Temp Readings from Last 3 Encounters:   09/08/17 98.3 °F (36.8 °C) (Oral)   09/08/17 97.7 °F (36.5 °C)   09/05/17 97.8 °F (36.6 °C)     BP Readings from Last 3 Encounters:   09/08/17 (!) 107/46   09/08/17 (!) 102/59   09/05/17 (!) 108/52     Pulse Readings from Last 3 Encounters:   09/08/17 70   09/08/17 69   09/05/17 67    GENERAL:  NAD  NEURO:  Awake, alert and oriented to time, person and place.  The patient   demonstrates no evidence of depression, anxiety or agitation.  Patient is   cooperative with exam and discussion.  EYES:  Normal conjunctivae and eyelids.  PERRLA 3 to 4 mm without icterus.  ENT AND MOUTH:  External appearance of ears and nose normal without scars,   lesions or masses.  Nasal mucosa, turbinates and septum are normal.  No ENT   drainage and dried blood noted.  No tenderness over frontal or maxillary sinus.    Lips and gums are normal.  Dentition is normal.  NECK:  Supple.  Trachea is central.  No crepitus.  No JVD.  No thyromegaly or   masses.   RESPIRATORY:  No intercostal retractions and no use of accessory muscles of   respirations noted.  No areas of dullness to percussion.  Chest is clear to   auscultation.  No rales, rhonchi or wheezes.  No crepitus.  Good air entry   bilaterally.  CARDIOVASCULAR:  No cardiomegaly.  Normal location.  No thrills or heaviness.    Normal carotid pulse.  No bruits.  S1 and S2 are normally heard without murmurs   or gallops.  All peripheral pulses, including pedal pulses, are present.  ABDOMEN:  Normal abdomen.  No hepatosplenomegaly.  No free fluid.  Bowel sounds   are present.  No hernia noted.  No masses.  No rebound or tenderness.  No   guarding or rigidity.   Umbilicus is midline.  LYMPHATICS:  No axillary, cervical, supraclavicular, submental, or inguinal   lymphadenopathy.  SKIN AND MUSCULOSKELETAL:  There is no evidence of excoriation marks or   ecchymosis.  No rashes.  No pigmented lesions, induration or subcutaneous   nodules.  No sores or abnormal moles.  No cyanosis.  No clubbing.  Nailbed   abnormalities are noted. No joint or skeletal deformities noted.  Normal range   of motion.  BREASTS:  No abnormal masses or discharge.  NEUROLOGIC:  Higher functions are appropriate.  No cranial nerve deficits.    Normal gait.  Normal strength.  Motor and sensory functions  are normal.  Deep   tendon reflexes are normal without Babinski's sign or ankle clonus.  GENITAL AND RECTAL:  Exams are deferred.    LABORATORIES:    Lab Results   Component Value Date    WBC 7.93 09/08/2017    HGB 8.6 (L) 09/08/2017    HCT 27.0 (L) 09/08/2017    MCV 84 09/08/2017     09/08/2017     CMP  Sodium   Date Value Ref Range Status   09/08/2017 141 136 - 145 mmol/L Final     Potassium   Date Value Ref Range Status   09/08/2017 4.1 3.5 - 5.1 mmol/L Final     Chloride   Date Value Ref Range Status   09/08/2017 108 95 - 110 mmol/L Final     CO2   Date Value Ref Range Status   09/08/2017 23 22 - 31 mmol/L Final     Glucose   Date Value Ref Range Status   09/08/2017 93 70 - 110 mg/dL Final     Comment:     The ADA recommends the following guidelines for fasting glucose:  Normal:       less than 100 mg/dL  Prediabetes:  100 mg/dL to 125 mg/dL  Diabetes:     126 mg/dL or higher       BUN, Bld   Date Value Ref Range Status   09/08/2017 9 7 - 18 mg/dL Final     Creatinine   Date Value Ref Range Status   09/08/2017 0.85 0.50 - 1.40 mg/dL Final     Calcium   Date Value Ref Range Status   09/08/2017 7.8 (L) 8.4 - 10.2 mg/dL Final     Total Protein   Date Value Ref Range Status   09/08/2017 6.8 6.0 - 8.4 g/dL Final     Albumin   Date Value Ref Range Status   09/08/2017 3.3 (L) 3.5 - 5.2 g/dL Final     Total Bilirubin   Date Value Ref Range Status   09/08/2017 0.5 0.2 - 1.3 mg/dL Final     Alkaline Phosphatase   Date Value Ref Range Status   09/08/2017 79 38 - 145 U/L Final     AST   Date Value Ref Range Status   09/08/2017 34 14 - 36 U/L Final     ALT   Date Value Ref Range Status   09/08/2017 22 10 - 44 U/L Final     Anion Gap   Date Value Ref Range Status   09/08/2017 10 8 - 16 mmol/L Final     eGFR if    Date Value Ref Range Status   09/08/2017 >60 >60 mL/min/1.73 m^2 Final     eGFR if non    Date Value Ref Range Status   09/08/2017 >60 >60 mL/min/1.73 m^2 Final     Comment:      Calculation used to obtain the estimated glomerular filtration  rate (eGFR) is the CKD-EPI equation. Since race is unknown   in our information system, the eGFR values for   -American and Non--American patients are given   for each creatinine result.      range.  PET scan shows there is mass as described above, with the SUV of 17.3,   7.4 x 6.1 cm, with central necrosis.  Large cavitary hypermetabolic left upper   lobe lesion at the apex of the left ventricle as well.  Hypermetabolic nodes   present in the aortopulmonary region, prevascular anterior amber left hilar   region, with a much lower SUV of the range of 4.7.  No other abdominal, pelvis   or skeletal abnormalities noted.    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    PLAN:  1.  Proceed with cycle #2 of carbo / taxol   With premeds   rtc 3 weeks with cbc, cmp   cont CAD montioring   transfuse 2 units prbc for support of symptomatic anemia    neulasta for chemotherapy indusced neutropebnia prevention . rescn after cycle #4

## 2017-09-10 RX ORDER — FENOFIBRATE 160 MG/1
TABLET ORAL
Qty: 30 TABLET | Refills: 5 | Status: ON HOLD | OUTPATIENT
Start: 2017-09-10 | End: 2018-01-08

## 2017-09-10 RX ORDER — AMLODIPINE BESYLATE 10 MG/1
TABLET ORAL
Qty: 30 TABLET | Refills: 5 | Status: ON HOLD | OUTPATIENT
Start: 2017-09-10 | End: 2018-01-08

## 2017-09-11 ENCOUNTER — DOCUMENTATION ONLY (OUTPATIENT)
Dept: INFUSION THERAPY | Facility: HOSPITAL | Age: 67
End: 2017-09-11

## 2017-09-11 ENCOUNTER — INFUSION (OUTPATIENT)
Dept: INFUSION THERAPY | Facility: HOSPITAL | Age: 67
End: 2017-09-11
Attending: INTERNAL MEDICINE
Payer: MEDICARE

## 2017-09-11 VITALS
HEIGHT: 65 IN | SYSTOLIC BLOOD PRESSURE: 118 MMHG | OXYGEN SATURATION: 98 % | WEIGHT: 127.69 LBS | DIASTOLIC BLOOD PRESSURE: 55 MMHG | RESPIRATION RATE: 18 BRPM | TEMPERATURE: 98 F | BODY MASS INDEX: 21.27 KG/M2 | HEART RATE: 69 BPM

## 2017-09-11 DIAGNOSIS — C34.00 MALIGNANT NEOPLASM OF HILUS OF LUNG, UNSPECIFIED LATERALITY: Primary | ICD-10-CM

## 2017-09-11 PROCEDURE — 96415 CHEMO IV INFUSION ADDL HR: CPT | Mod: PN

## 2017-09-11 PROCEDURE — 96375 TX/PRO/DX INJ NEW DRUG ADDON: CPT | Mod: PN

## 2017-09-11 PROCEDURE — 96413 CHEMO IV INFUSION 1 HR: CPT | Mod: PN

## 2017-09-11 PROCEDURE — 25000003 PHARM REV CODE 250: Mod: PN | Performed by: INTERNAL MEDICINE

## 2017-09-11 PROCEDURE — 96376 TX/PRO/DX INJ SAME DRUG ADON: CPT | Mod: PN

## 2017-09-11 PROCEDURE — 96417 CHEMO IV INFUS EACH ADDL SEQ: CPT | Mod: PN

## 2017-09-11 PROCEDURE — 96377 APPLICATON ON-BODY INJECTOR: CPT | Mod: 59,PN

## 2017-09-11 PROCEDURE — 63600175 PHARM REV CODE 636 W HCPCS: Mod: PN | Performed by: INTERNAL MEDICINE

## 2017-09-11 PROCEDURE — S0028 INJECTION, FAMOTIDINE, 20 MG: HCPCS | Mod: PN | Performed by: INTERNAL MEDICINE

## 2017-09-11 PROCEDURE — 96367 TX/PROPH/DG ADDL SEQ IV INF: CPT | Mod: PN

## 2017-09-11 RX ORDER — SODIUM CHLORIDE 0.9 % (FLUSH) 0.9 %
10 SYRINGE (ML) INJECTION
Status: DISCONTINUED | OUTPATIENT
Start: 2017-09-11 | End: 2017-09-12 | Stop reason: HOSPADM

## 2017-09-11 RX ORDER — FAMOTIDINE 10 MG/ML
20 INJECTION INTRAVENOUS ONCE
Status: COMPLETED | OUTPATIENT
Start: 2017-09-11 | End: 2017-09-11

## 2017-09-11 RX ORDER — HEPARIN 100 UNIT/ML
500 SYRINGE INTRAVENOUS
Status: DISCONTINUED | OUTPATIENT
Start: 2017-09-11 | End: 2017-09-12 | Stop reason: HOSPADM

## 2017-09-11 RX ORDER — ONDANSETRON 2 MG/ML
8 INJECTION INTRAMUSCULAR; INTRAVENOUS ONCE
Status: COMPLETED | OUTPATIENT
Start: 2017-09-11 | End: 2017-09-11

## 2017-09-11 RX ORDER — DIPHENHYDRAMINE HYDROCHLORIDE 50 MG/ML
50 INJECTION INTRAMUSCULAR; INTRAVENOUS ONCE
Status: COMPLETED | OUTPATIENT
Start: 2017-09-11 | End: 2017-09-11

## 2017-09-11 RX ORDER — FAMOTIDINE 20 MG/50ML
20 INJECTION, SOLUTION INTRAVENOUS
Status: COMPLETED | OUTPATIENT
Start: 2017-09-11 | End: 2017-09-11

## 2017-09-11 RX ORDER — METHYLPREDNISOLONE SOD SUCC 125 MG
125 VIAL (EA) INJECTION ONCE
Status: COMPLETED | OUTPATIENT
Start: 2017-09-11 | End: 2017-09-11

## 2017-09-11 RX ADMIN — ONDANSETRON 8 MG: 2 INJECTION INTRAMUSCULAR; INTRAVENOUS at 11:09

## 2017-09-11 RX ADMIN — METHYLPREDNISOLONE SODIUM SUCCINATE 125 MG: 125 INJECTION, POWDER, FOR SOLUTION INTRAMUSCULAR; INTRAVENOUS at 11:09

## 2017-09-11 RX ADMIN — PALONOSETRON HYDROCHLORIDE: 0.25 INJECTION INTRAVENOUS at 09:09

## 2017-09-11 RX ADMIN — FAMOTIDINE 20 MG: 20 INJECTION, SOLUTION INTRAVENOUS at 10:09

## 2017-09-11 RX ADMIN — DIPHENHYDRAMINE HYDROCHLORIDE 50 MG: 50 INJECTION, SOLUTION INTRAMUSCULAR; INTRAVENOUS at 10:09

## 2017-09-11 RX ADMIN — CARBOPLATIN 525 MG: 10 INJECTION, SOLUTION INTRAVENOUS at 02:09

## 2017-09-11 RX ADMIN — HEPARIN 500 UNITS: 100 SYRINGE at 04:09

## 2017-09-11 RX ADMIN — PEGFILGRASTIM 6 MG: KIT SUBCUTANEOUS at 04:09

## 2017-09-11 RX ADMIN — FAMOTIDINE 20 MG: 10 INJECTION INTRAVENOUS at 11:09

## 2017-09-11 RX ADMIN — PACLITAXEL 288 MG: 6 INJECTION, SOLUTION, CONCENTRATE INTRAVENOUS at 10:09

## 2017-09-11 RX ADMIN — DIPHENHYDRAMINE HYDROCHLORIDE 50 MG: 50 INJECTION INTRAMUSCULAR; INTRAVENOUS at 11:09

## 2017-09-11 NOTE — NURSING
1112: PT c/o nausea and chest tightness, taxol stopped, NS infusing, rxn kit given see MAR. /63, HR 75, 02 sat 98 on 2L 02 per NC. MD notified verbal order to restart in 30 min if pt feeling better.

## 2017-09-11 NOTE — NURSING
1500: Pt c/o SOB, nausea and feeling of heaviness in her chest. Carboplatin infusion stopped, NS infusing Vitals MD JA notified, verbal orders given to monitor pt and restart in 30 min if symptoms subside.

## 2017-09-11 NOTE — PROGRESS NOTES
Nutrition: Met with pt today while she was having chemo infusion #2. Pt is currently a moderate nutritional nutritional risk. Pt informed me she is eating, but not as much as she was before treatment started due to taste changes. Wt: 127# Weight loss of 4#s since last treatment. Discussed with patient what she was eating and based on food log she likes savory foods. Pointed that out to patient and encouraged her to eat more savory foods rather than sweet foods. Discussed foods that are easy to prepare that she can begin to incorporate into her diet. Encouraged pt to maintain weight. Discussed the importance of eating while on treatment. Offered encouragement and support. Will follow up at next cycle to monitor weight and oral intake. Desirae Serra,MS, RD, LDN

## 2017-09-11 NOTE — NURSING
1140: Taxol infusion restarted, pt with no complaints BP 13/65, HR 74, RR 18, 02 100% 2 L NC. Pt denies any SOB, chest tightness, or nausea at this time.

## 2017-09-11 NOTE — PLAN OF CARE
Problem: Patient Care Overview (Adult)  Goal: Plan of Care Review  Pt tolerated treatment well

## 2017-09-11 NOTE — NURSING
1530 Carboplatin restarted, VS WNL pt denies any SOB, nausea, or chest heaviness. Will continue to monitor.

## 2017-09-12 ENCOUNTER — TELEPHONE (OUTPATIENT)
Dept: FAMILY MEDICINE | Facility: CLINIC | Age: 67
End: 2017-09-12

## 2017-09-12 DIAGNOSIS — R52 PAIN: ICD-10-CM

## 2017-09-12 RX ORDER — HYDROCODONE BITARTRATE AND ACETAMINOPHEN 5; 325 MG/1; MG/1
1 TABLET ORAL EVERY 4 HOURS PRN
Qty: 60 TABLET | Refills: 0 | Status: SHIPPED | OUTPATIENT
Start: 2017-09-12 | End: 2017-09-29 | Stop reason: SDUPTHER

## 2017-09-12 NOTE — TELEPHONE ENCOUNTER
----- Message from Shawna Gonzalez sent at 9/12/2017  8:59 AM CDT -----  Contact: daughter Evelin   Patient's daughter Sharon called requesting refill on Norco 5 mg and her cough medication (not sure of name) called to [facility]    If any questions  Please call     Thanks

## 2017-09-12 NOTE — TELEPHONE ENCOUNTER
----- Message from Milena Cornell sent at 9/12/2017 10:07 AM CDT -----  [facility]/Jean [phone] ext [phone] is calling because oxygen was ordered for patient but it is too heavy. She needs orders for a portable B tank or a portable concentrator faxed to [phone].

## 2017-09-15 ENCOUNTER — TELEPHONE (OUTPATIENT)
Dept: HEMATOLOGY/ONCOLOGY | Facility: CLINIC | Age: 67
End: 2017-09-15

## 2017-09-15 NOTE — TELEPHONE ENCOUNTER
----- Message from Dianne Varma sent at 9/15/2017  1:24 PM CDT -----  Contact: Evelin Saunders  Daughter called regarding the patient currently in a lot of pain. Wanted to know instead of giving her 1 Narco pain med, can she give 2. Please contact Evelin [phone]

## 2017-09-18 ENCOUNTER — TELEPHONE (OUTPATIENT)
Dept: FAMILY MEDICINE | Facility: CLINIC | Age: 67
End: 2017-09-18

## 2017-09-18 NOTE — TELEPHONE ENCOUNTER
----- Message from Gia Lake sent at 9/18/2017  9:44 AM CDT -----  Contact: daughter- Evelin  States patient was told to schedule an appt after she started chemo. Also says patient is having difficulty with some meds.  There is nothing available with Dr King until 10/24.  Please call back at [phone].

## 2017-09-19 ENCOUNTER — OFFICE VISIT (OUTPATIENT)
Dept: FAMILY MEDICINE | Facility: CLINIC | Age: 67
End: 2017-09-19
Payer: MEDICARE

## 2017-09-19 VITALS
OXYGEN SATURATION: 97 % | DIASTOLIC BLOOD PRESSURE: 62 MMHG | SYSTOLIC BLOOD PRESSURE: 102 MMHG | BODY MASS INDEX: 20.87 KG/M2 | WEIGHT: 125.25 LBS | HEART RATE: 103 BPM | HEIGHT: 65 IN

## 2017-09-19 DIAGNOSIS — L08.9 SKIN INFLAMMATION: ICD-10-CM

## 2017-09-19 DIAGNOSIS — C34.00 MALIGNANT NEOPLASM OF HILUS OF LUNG, UNSPECIFIED LATERALITY: ICD-10-CM

## 2017-09-19 DIAGNOSIS — E11.9 DIABETES MELLITUS WITHOUT COMPLICATION: Primary | ICD-10-CM

## 2017-09-19 DIAGNOSIS — M79.10 MYALGIA: ICD-10-CM

## 2017-09-19 PROCEDURE — 3074F SYST BP LT 130 MM HG: CPT | Mod: S$GLB,,, | Performed by: FAMILY MEDICINE

## 2017-09-19 PROCEDURE — 99999 PR PBB SHADOW E&M-EST. PATIENT-LVL III: CPT | Mod: PBBFAC,,, | Performed by: FAMILY MEDICINE

## 2017-09-19 PROCEDURE — 3044F HG A1C LEVEL LT 7.0%: CPT | Mod: S$GLB,,, | Performed by: FAMILY MEDICINE

## 2017-09-19 PROCEDURE — 99499 UNLISTED E&M SERVICE: CPT | Mod: S$PBB,,, | Performed by: FAMILY MEDICINE

## 2017-09-19 PROCEDURE — 3078F DIAST BP <80 MM HG: CPT | Mod: S$GLB,,, | Performed by: FAMILY MEDICINE

## 2017-09-19 PROCEDURE — 99214 OFFICE O/P EST MOD 30 MIN: CPT | Mod: S$GLB,,, | Performed by: FAMILY MEDICINE

## 2017-09-19 PROCEDURE — 3008F BODY MASS INDEX DOCD: CPT | Mod: S$GLB,,, | Performed by: FAMILY MEDICINE

## 2017-09-19 RX ORDER — AMOXICILLIN 250 MG
1 CAPSULE ORAL DAILY
COMMUNITY

## 2017-09-19 NOTE — PROGRESS NOTES
Subjective:       Patient ID: Amber Sharp is a 66 y.o. female.    Chief Complaint: Medication Management and Diabetic Foot Exam    HPI     Seeing oncology for management of her large squamous cell lung cancer. Finished her 2nd chemotherapy.      Pt reports adverse effects from chemo. Reports generalized muscular pain. Ps: 6-8/10 while on two norco 5 every 4 hours.  Reports intolerance to ms contin which made her very sedated.      Dm2 with neuropathy:  cks 4 times a week  Fbs:   On metformin  a1c 8/2017-5.6%  Not utd with eye dr    Reports soreness on superior gluteal cleft x 2 weeks. Concerned about bed sore.        Review of Systems   Constitutional: Positive for activity change and unexpected weight change.   HENT: Positive for hearing loss, rhinorrhea and trouble swallowing.    Eyes: Negative for discharge and visual disturbance.   Respiratory: Positive for chest tightness and wheezing.    Cardiovascular: Positive for chest pain and palpitations.   Gastrointestinal: Positive for diarrhea. Negative for blood in stool, constipation and vomiting.   Endocrine: Negative for polydipsia and polyuria.   Genitourinary: Negative for difficulty urinating, dysuria, hematuria and menstrual problem.   Musculoskeletal: Positive for arthralgias and neck pain. Negative for joint swelling.   Neurological: Positive for weakness and headaches.   Psychiatric/Behavioral: Positive for confusion. Negative for dysphoric mood.       Objective:      Physical Exam   Constitutional: She appears well-developed.   HENT:   Head: Normocephalic and atraumatic.   Eyes: Conjunctivae are normal. Pupils are equal, round, and reactive to light.   Neck: Normal range of motion.   Cardiovascular: Normal rate and regular rhythm.    No murmur heard.  Pulmonary/Chest: Effort normal and breath sounds normal. She has no wheezes.   Genitourinary:   Genitourinary Comments: Nurse present:    Mild redness noted on superior gluteal cleft.  No break in  skin or ulceration.    Lymphadenopathy:     She has no cervical adenopathy.         Protective Sensation (w/ 10 gram monofilament):  Right: Intact  Left: Intact    Visual Inspection:  Normal -  Bilateral    Pedal Pulses:   Right: Present  Left: Present    Posterior tibialis:   Right:Present  Left: Present        Assessment:       1. Diabetes mellitus without complication    2. Malignant neoplasm of hilus of lung, unspecified laterality    3. Myalgia    4. Skin inflammation        Plan:       Diabetes mellitus without complication    Malignant neoplasm of hilus of lung, unspecified laterality    Myalgia    Skin inflammation                Plan:  D/c metformin  Patient needs stronger pain control such as oxycodone.  Recommend desitin for redness on superior gluteal cleft.   Cont current meds      Medication List with Changes/Refills   Current Medications    ALBUTEROL (PROVENTIL HFA) 90 MCG/ACTUATION INHALER    Inhale 2 puffs into the lungs every 6 (six) hours as needed.    ALENDRONATE (FOSAMAX) 70 MG TABLET    TAKE ONE TABLET BY MOUTH ONCE DAILY EVERY 7 days    AMLODIPINE (NORVASC) 10 MG TABLET    TAKE ONE TABLET BY MOUTH ONCE DAILY    ASPIRIN (ECOTRIN) 81 MG EC TABLET    Take 81 mg by mouth once daily.      ATORVASTATIN (LIPITOR) 40 MG TABLET    TAKE ONE TABLET BY MOUTH ONCE DAILY    AZELASTINE (ASTELIN) 137 MCG (0.1 %) NASAL SPRAY    1 spray (137 mcg total) by Nasal route 2 (two) times daily.    BLOOD SUGAR DIAGNOSTIC STRP    1 strip by Misc.(Non-Drug; Combo Route) route 2 (two) times daily.    BLOOD-GLUCOSE METER KIT    Use as instructed    CITALOPRAM (CELEXA) 20 MG TABLET    TAKE ONE TABLET BY MOUTH EVERY DAY    CLOPIDOGREL (PLAVIX) 75 MG TABLET    TAKE ONE TABLET BY MOUTH ONCE DAILY    CYANOCOBALAMIN, VITAMIN B-12, (VITAMIN B-12) 2,500 MCG SUBL    Place 2,500 mcg under the tongue 4 (four) times daily.    FENOFIBRATE 160 MG TAB    TAKE ONE TABLET BY MOUTH ONCE DAILY    FLUTICASONE (FLONASE) 50 MCG/ACTUATION NASAL  SPRAY    2 sprays by Each Nare route once daily.    GABAPENTIN (NEURONTIN) 300 MG CAPSULE    TAKE ONE CAPSULE BY MOUTH twice daily    HYDROCODONE-ACETAMINOPHEN 5-325MG (NORCO) 5-325 MG PER TABLET    Take 1 tablet by mouth every 4 (four) hours as needed for Pain.    HYDROCORTISONE 2.5 % CREAM    Apply topically 2 (two) times daily.    KETOCONAZOLE (NIZORAL) 2 % CREAM    APPLY TO THE AFFECTED AREA twice daily use with desonide    LANCETS MISC    1 Units by Misc.(Non-Drug; Combo Route) route 3 (three) times daily.    LIDOCAINE-PRILOCAINE (EMLA) CREAM    Apply to affected area once    LORAZEPAM (ATIVAN) 1 MG TABLET    TAKE ONE TABLET BY MOUTH EVERY EIGHT HOURS AS NEEDED    MEGESTROL (MEGACE) 400 MG/10 ML (40 MG/ML) SUSP    Take 10 mLs (400 mg total) by mouth once daily.    METFORMIN (GLUCOPHAGE) 500 MG TABLET    TAKE ONE TABLET BY MOUTH TWICE DAILY WITH FOOD    METOPROLOL SUCCINATE (TOPROL-XL) 25 MG 24 HR TABLET    TAKE ONE TABLET BY MOUTH ONCE DAILY    MOMETASONE 0.1% (ELOCON) 0.1 % CREAM    APPLY TO THE AFFECTED AREA twice daily. use with econazole cream as directed    MORPHINE (MS CONTIN) 15 MG 12 HR TABLET    Take 1 tablet (15 mg total) by mouth every 8 (eight) hours as needed for Pain. Add stool softeners daily to avoid constipation    MOUTHWASHES SOLN    PHARMACIST:  MIX 50mL Benadryl Elixir; 50mL Viscous Lidocaine; 50mL Nystatin Suspension; 50mL Milk of Magnesia  PATIENT:  Take 5mL by mouth - Swish and swallow - every 4 hours as needed for mouth/throat pain.    ONDANSETRON (ZOFRAN-ODT) 8 MG TBDL    Take 1 tablet (8 mg total) by mouth every 12 (twelve) hours as needed.    PROCHLORPERAZINE (COMPAZINE) 10 MG TABLET    Take 1 tablet (10 mg total) by mouth every 6 (six) hours as needed.    QUETIAPINE (SEROQUEL) 100 MG TAB    TAKE ONE TABLET BY MOUTH ONCE DAILY IN THE EVENING    SALIVA SUBSTITUTE COMBO NO.9 (BIOTENE DRY MOUTH ORAL RINSE) MWSH    by Mucous Membrane route.    SENNA-DOCUSATE 8.6-50 MG (SENNA WITH DOCUSATE  SODIUM) 8.6-50 MG PER TABLET    Take 1 tablet by mouth once daily.    SYMBICORT 160-4.5 MCG/ACTUATION HFAA    inhale TWO puffs BY MOUTH and into lungs TWICE DAILY    TRIAMCINOLONE ACETONIDE 0.1% (KENALOG) 0.1 % OINTMENT    Apply topically 2 (two) times daily. Avoid face, axillae and groin.

## 2017-09-27 ENCOUNTER — TELEPHONE (OUTPATIENT)
Dept: FAMILY MEDICINE | Facility: CLINIC | Age: 67
End: 2017-09-27

## 2017-09-27 NOTE — TELEPHONE ENCOUNTER
----- Message from Dianne Varma sent at 9/27/2017  3:59 PM CDT -----  Contact: self  Patient called regarding portal oxygen tank order, the medical supply company called and need prior authorization, [facility]. Please contact [phone] (home)

## 2017-09-29 ENCOUNTER — OFFICE VISIT (OUTPATIENT)
Dept: HEMATOLOGY/ONCOLOGY | Facility: CLINIC | Age: 67
End: 2017-09-29
Payer: MEDICARE

## 2017-09-29 ENCOUNTER — LAB VISIT (OUTPATIENT)
Dept: LAB | Facility: HOSPITAL | Age: 67
End: 2017-09-29
Attending: INTERNAL MEDICINE
Payer: MEDICARE

## 2017-09-29 VITALS
BODY MASS INDEX: 21.41 KG/M2 | DIASTOLIC BLOOD PRESSURE: 55 MMHG | SYSTOLIC BLOOD PRESSURE: 112 MMHG | HEART RATE: 64 BPM | RESPIRATION RATE: 18 BRPM | WEIGHT: 128.5 LBS | HEIGHT: 65 IN | TEMPERATURE: 98 F

## 2017-09-29 DIAGNOSIS — C34.00 MALIGNANT NEOPLASM OF HILUS OF LUNG, UNSPECIFIED LATERALITY: ICD-10-CM

## 2017-09-29 DIAGNOSIS — E08.00 DIABETES MELLITUS DUE TO UNDERLYING CONDITION WITH HYPEROSMOLARITY WITHOUT COMA, WITHOUT LONG-TERM CURRENT USE OF INSULIN: ICD-10-CM

## 2017-09-29 DIAGNOSIS — R52 PAIN: ICD-10-CM

## 2017-09-29 DIAGNOSIS — I10 ESSENTIAL HYPERTENSION: ICD-10-CM

## 2017-09-29 DIAGNOSIS — E53.8 B12 DEFICIENCY: ICD-10-CM

## 2017-09-29 DIAGNOSIS — C34.00 MALIGNANT NEOPLASM OF HILUS OF LUNG, UNSPECIFIED LATERALITY: Primary | ICD-10-CM

## 2017-09-29 DIAGNOSIS — E78.5 DYSLIPIDEMIA: ICD-10-CM

## 2017-09-29 DIAGNOSIS — C50.011 MALIGNANT NEOPLASM OF NIPPLE OF RIGHT BREAST IN FEMALE, UNSPECIFIED ESTROGEN RECEPTOR STATUS: ICD-10-CM

## 2017-09-29 DIAGNOSIS — Z95.1 S/P CABG (CORONARY ARTERY BYPASS GRAFT): ICD-10-CM

## 2017-09-29 LAB
ALBUMIN SERPL BCP-MCNC: 3.6 G/DL
ALP SERPL-CCNC: 81 U/L
ALT SERPL W/O P-5'-P-CCNC: 40 U/L
ANION GAP SERPL CALC-SCNC: 11 MMOL/L
AST SERPL-CCNC: 40 U/L
BASOPHILS # BLD AUTO: 0.03 K/UL
BASOPHILS NFR BLD: 0.4 %
BILIRUB SERPL-MCNC: 0.5 MG/DL
BUN SERPL-MCNC: 10 MG/DL
CALCIUM SERPL-MCNC: 8.4 MG/DL
CHLORIDE SERPL-SCNC: 105 MMOL/L
CO2 SERPL-SCNC: 26 MMOL/L
CREAT SERPL-MCNC: 0.81 MG/DL
DIFFERENTIAL METHOD: ABNORMAL
EOSINOPHIL # BLD AUTO: 0 K/UL
EOSINOPHIL NFR BLD: 0.4 %
ERYTHROCYTE [DISTWIDTH] IN BLOOD BY AUTOMATED COUNT: 16.3 %
EST. GFR  (AFRICAN AMERICAN): >60 ML/MIN/1.73 M^2
EST. GFR  (NON AFRICAN AMERICAN): >60 ML/MIN/1.73 M^2
GLUCOSE SERPL-MCNC: 112 MG/DL
HCT VFR BLD AUTO: 31.5 %
HGB BLD-MCNC: 10.2 G/DL
LYMPHOCYTES # BLD AUTO: 2.5 K/UL
LYMPHOCYTES NFR BLD: 36.2 %
MCH RBC QN AUTO: 28.2 PG
MCHC RBC AUTO-ENTMCNC: 32.4 G/DL
MCV RBC AUTO: 87 FL
MONOCYTES # BLD AUTO: 0.7 K/UL
MONOCYTES NFR BLD: 10.6 %
NEUTROPHILS # BLD AUTO: 3.6 K/UL
NEUTROPHILS NFR BLD: 52.4 %
NRBC BLD-RTO: 0 /100 WBC
PLATELET # BLD AUTO: 110 K/UL
PMV BLD AUTO: 10.7 FL
POTASSIUM SERPL-SCNC: 3.4 MMOL/L
PROT SERPL-MCNC: 6.8 G/DL
RBC # BLD AUTO: 3.62 M/UL
SODIUM SERPL-SCNC: 142 MMOL/L
WBC # BLD AUTO: 6.87 K/UL

## 2017-09-29 PROCEDURE — 3078F DIAST BP <80 MM HG: CPT | Mod: S$GLB,,, | Performed by: INTERNAL MEDICINE

## 2017-09-29 PROCEDURE — 80053 COMPREHEN METABOLIC PANEL: CPT | Mod: PN

## 2017-09-29 PROCEDURE — 3008F BODY MASS INDEX DOCD: CPT | Mod: S$GLB,,, | Performed by: INTERNAL MEDICINE

## 2017-09-29 PROCEDURE — 3074F SYST BP LT 130 MM HG: CPT | Mod: S$GLB,,, | Performed by: INTERNAL MEDICINE

## 2017-09-29 PROCEDURE — 80053 COMPREHEN METABOLIC PANEL: CPT

## 2017-09-29 PROCEDURE — 99499 UNLISTED E&M SERVICE: CPT | Mod: S$PBB,,, | Performed by: INTERNAL MEDICINE

## 2017-09-29 PROCEDURE — 99999 PR PBB SHADOW E&M-EST. PATIENT-LVL III: CPT | Mod: PBBFAC,,, | Performed by: INTERNAL MEDICINE

## 2017-09-29 PROCEDURE — 85025 COMPLETE CBC W/AUTO DIFF WBC: CPT

## 2017-09-29 PROCEDURE — 99215 OFFICE O/P EST HI 40 MIN: CPT | Mod: S$GLB,,, | Performed by: INTERNAL MEDICINE

## 2017-09-29 PROCEDURE — 85025 COMPLETE CBC W/AUTO DIFF WBC: CPT | Mod: PN

## 2017-09-29 PROCEDURE — 36415 COLL VENOUS BLD VENIPUNCTURE: CPT | Mod: PN

## 2017-09-29 RX ORDER — FENTANYL 25 UG/1
1 PATCH TRANSDERMAL
Qty: 10 PATCH | Refills: 0 | Status: SHIPPED | OUTPATIENT
Start: 2017-09-29 | End: 2017-09-29 | Stop reason: SDUPTHER

## 2017-09-29 RX ORDER — FENTANYL 25 UG/1
1 PATCH TRANSDERMAL
Qty: 10 PATCH | Refills: 0 | Status: SHIPPED | OUTPATIENT
Start: 2017-09-29 | End: 2017-10-02 | Stop reason: SDUPTHER

## 2017-09-29 RX ORDER — LIDOCAINE AND PRILOCAINE 25; 25 MG/G; MG/G
CREAM TOPICAL
Qty: 5 G | Refills: 0 | Status: SHIPPED | OUTPATIENT
Start: 2017-09-29 | End: 2017-10-20 | Stop reason: SDUPTHER

## 2017-09-29 RX ORDER — LIDOCAINE AND PRILOCAINE 25; 25 MG/G; MG/G
CREAM TOPICAL
Qty: 5 G | Refills: 0 | Status: SHIPPED | OUTPATIENT
Start: 2017-09-29 | End: 2017-09-29 | Stop reason: SDUPTHER

## 2017-09-29 RX ORDER — HYDROCODONE BITARTRATE AND ACETAMINOPHEN 5; 325 MG/1; MG/1
1 TABLET ORAL EVERY 4 HOURS PRN
Qty: 60 TABLET | Refills: 0 | Status: CANCELLED | OUTPATIENT
Start: 2017-09-29

## 2017-09-29 RX ORDER — HYDROCODONE BITARTRATE AND ACETAMINOPHEN 5; 325 MG/1; MG/1
2 TABLET ORAL EVERY 4 HOURS PRN
Qty: 180 TABLET | Refills: 0 | Status: SHIPPED | OUTPATIENT
Start: 2017-09-29 | End: 2017-10-20 | Stop reason: SDUPTHER

## 2017-09-29 RX ORDER — ONDANSETRON 8 MG/1
8 TABLET, ORALLY DISINTEGRATING ORAL EVERY 12 HOURS PRN
Qty: 30 TABLET | Refills: 1 | Status: CANCELLED | OUTPATIENT
Start: 2017-09-29 | End: 2018-09-29

## 2017-09-29 RX ORDER — ONDANSETRON 8 MG/1
8 TABLET, ORALLY DISINTEGRATING ORAL EVERY 12 HOURS PRN
Qty: 30 TABLET | Refills: 1 | Status: SHIPPED | OUTPATIENT
Start: 2017-09-29 | End: 2017-10-20 | Stop reason: SDUPTHER

## 2017-09-29 RX ORDER — LIDOCAINE AND PRILOCAINE 25; 25 MG/G; MG/G
CREAM TOPICAL
Qty: 5 G | Refills: 0 | Status: SHIPPED | OUTPATIENT
Start: 2017-09-29 | End: 2017-11-17 | Stop reason: SDUPTHER

## 2017-09-29 NOTE — PROGRESS NOTES
This is a 66-year-old  woman known to me.  The patient has been   following from a diagnosis of breast cancer in 2007.  She was treated at [facility].  At that time, mitoxantrone and Cytoxan was used because of significant   extensive coronary artery disease and peripheral vascular disease.  The patient   underwent double mastectomy, radiation therapy and adjuvant therapy all at   [facility], under the guidance of Dr. Downing.  She was seen by me for B12   deficiency, hypertension, diabetes, dyslipidemia, coronary artery disease and   depression, and at the last visit, she had complained of feeling more tired and   sleeping a lot, and had not had a recent follow up with Dr. Ha and hence, we   sent her back for cardiac evaluation.  The patient had imaging study done, an   echocardiogram that showed a mass that was wrapping around the heart.  This   raised immediate suspicion.  The patient had been seen by Dr. Hopkins in lieu of   Dr. Mclaughlin, and the patient's family practice doctor, Dr. King, was   contacted.  We asked for an immediate biopsy of this mass.  The films were   discussed with Dr. Martin.  Interventional radiology-guided biopsy was   recommended.  The patient was sent for biopsy with Dr. Connolly at [facility].  Pathology comes back as squamous cell CA.  Pt started carbo / taxol chemotherapy here for cycle #3. With daughter, has been considering stopping rx does have pain PS +1 and pain of about 6PHYSICAL EXAMINATION:  Wt Readings from Last 3 Encounters:   09/19/17 56.8 kg (125 lb 3.5 oz)   09/11/17 57.9 kg (127 lb 11.2 oz)   09/08/17 58.3 kg (128 lb 8.5 oz)     Temp Readings from Last 3 Encounters:   09/11/17 98.2 °F (36.8 °C)   09/08/17 98.6 °F (37 °C) (Oral)   09/08/17 97.7 °F (36.5 °C)     BP Readings from Last 3 Encounters:   09/19/17 102/62   09/11/17 (!) 118/55   09/08/17 (!) 118/58     Pulse Readings from Last 3 Encounters:   09/19/17 103   09/11/17 69    09/08/17 67   GENERAL:  NAD  NEURO:  Awake, alert and oriented to time, person and place.  The patient   demonstrates no evidence of depression, anxiety or agitation.  Patient is   cooperative with exam and discussion.  EYES:  Normal conjunctivae and eyelids.  PERRLA 3 to 4 mm without icterus.  ENT AND MOUTH:  External appearance of ears and nose normal without scars,   lesions or masses.  Nasal mucosa, turbinates and septum are normal.  No ENT   drainage and dried blood noted.  No tenderness over frontal or maxillary sinus.    Lips and gums are normal.  Dentition is normal.  NECK:  Supple.  Trachea is central.  No crepitus.  No JVD.  No thyromegaly or   masses.   RESPIRATORY:  No intercostal retractions and no use of accessory muscles of   respirations noted.  No areas of dullness to percussion.  Chest is clear to   auscultation.  No rales, rhonchi or wheezes.  No crepitus.  Good air entry   bilaterally.  CARDIOVASCULAR:  No cardiomegaly.  Normal location.  No thrills or heaviness.    Normal carotid pulse.  No bruits.  S1 and S2 are normally heard without murmurs   or gallops.  All peripheral pulses, including pedal pulses, are present.  ABDOMEN:  Normal abdomen.  No hepatosplenomegaly.  No free fluid.  Bowel sounds   are present.  No hernia noted.  No masses.  No rebound or tenderness.  No   guarding or rigidity.   Umbilicus is midline.  LYMPHATICS:  No axillary, cervical, supraclavicular, submental, or inguinal   lymphadenopathy.  SKIN AND MUSCULOSKELETAL:  There is no evidence of excoriation marks or   ecchymosis.  No rashes.  No pigmented lesions, induration or subcutaneous   nodules.  No sores or abnormal moles.  No cyanosis.  No clubbing.  Nailbed   abnormalities are noted. No joint or skeletal deformities noted.  Normal range   of motion.  BREASTS:  No abnormal masses or discharge.  NEUROLOGIC:  Higher functions are appropriate.  No cranial nerve deficits.    Normal gait.  Normal strength.  Motor and  sensory functions are normal.  Deep   tendon reflexes are normal without Babinski's sign or ankle clonus.  GENITAL AND RECTAL:  Exams are deferred.    LABORATORIES:    Lab Results   Component Value Date    WBC 6.87 09/29/2017    HGB 10.2 (L) 09/29/2017    HCT 31.5 (L) 09/29/2017    MCV 87 09/29/2017     (L) 09/29/2017     CMP  Sodium   Date Value Ref Range Status   09/29/2017 142 136 - 145 mmol/L Final     Potassium   Date Value Ref Range Status   09/29/2017 3.4 (L) 3.5 - 5.1 mmol/L Final     Chloride   Date Value Ref Range Status   09/29/2017 105 95 - 110 mmol/L Final     CO2   Date Value Ref Range Status   09/29/2017 26 22 - 31 mmol/L Final     Glucose   Date Value Ref Range Status   09/29/2017 112 (H) 70 - 110 mg/dL Final     Comment:     The ADA recommends the following guidelines for fasting glucose:  Normal:       less than 100 mg/dL  Prediabetes:  100 mg/dL to 125 mg/dL  Diabetes:     126 mg/dL or higher       BUN, Bld   Date Value Ref Range Status   09/29/2017 10 7 - 18 mg/dL Final     Creatinine   Date Value Ref Range Status   09/29/2017 0.81 0.50 - 1.40 mg/dL Final     Calcium   Date Value Ref Range Status   09/29/2017 8.4 8.4 - 10.2 mg/dL Final     Total Protein   Date Value Ref Range Status   09/29/2017 6.8 6.0 - 8.4 g/dL Final     Albumin   Date Value Ref Range Status   09/29/2017 3.6 3.5 - 5.2 g/dL Final     Total Bilirubin   Date Value Ref Range Status   09/29/2017 0.5 0.2 - 1.3 mg/dL Final     Alkaline Phosphatase   Date Value Ref Range Status   09/29/2017 81 38 - 145 U/L Final     AST   Date Value Ref Range Status   09/29/2017 40 (H) 14 - 36 U/L Final     ALT   Date Value Ref Range Status   09/29/2017 40 10 - 44 U/L Final     Anion Gap   Date Value Ref Range Status   09/29/2017 11 8 - 16 mmol/L Final     eGFR if    Date Value Ref Range Status   09/29/2017 >60 >60 mL/min/1.73 m^2 Final     eGFR if non    Date Value Ref Range Status   09/29/2017 >60 >60  mL/min/1.73 m^2 Final     Comment:     Calculation used to obtain the estimated glomerular filtration  rate (eGFR) is the CKD-EPI equation. Since race is unknown   in our information system, the eGFR values for   -American and Non--American patients are given   for each creatinine result.      range.  PET scan shows there is mass as described above, with the SUV of 17.3,   7.4 x 6.1 cm, with central necrosis.  Large cavitary hypermetabolic left upper   lobe lesion at the apex of the left ventricle as well.  Hypermetabolic nodes   present in the aortopulmonary region, prevascular anterior amber left hilar   region, with a much lower SUV of the range of 4.7.  No other abdominal, pelvis   or skeletal abnormalities noted.    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    PLAN:  1.  Proceed with cycle #3 of carbo / taxol   With premeds   rtc 3 weeks with cbc, cmp will get PET just prior to see if ongoing rx with same drugs is indicated   cont CAD montioring also of HTN, lipids and Dm     neulasta for chemotherapy indusced neutropebnia prevention .   cont cardiologu f/u   add fentanyl pt not taking morphine , hydrocodone refilled

## 2017-10-02 ENCOUNTER — DOCUMENTATION ONLY (OUTPATIENT)
Dept: INFUSION THERAPY | Facility: HOSPITAL | Age: 67
End: 2017-10-02

## 2017-10-02 ENCOUNTER — INFUSION (OUTPATIENT)
Dept: INFUSION THERAPY | Facility: HOSPITAL | Age: 67
End: 2017-10-02
Attending: INTERNAL MEDICINE
Payer: MEDICARE

## 2017-10-02 VITALS
RESPIRATION RATE: 18 BRPM | SYSTOLIC BLOOD PRESSURE: 112 MMHG | DIASTOLIC BLOOD PRESSURE: 60 MMHG | OXYGEN SATURATION: 98 % | BODY MASS INDEX: 21.13 KG/M2 | HEART RATE: 64 BPM | WEIGHT: 127 LBS | TEMPERATURE: 98 F

## 2017-10-02 DIAGNOSIS — C34.00 MALIGNANT NEOPLASM OF HILUS OF LUNG, UNSPECIFIED LATERALITY: Primary | ICD-10-CM

## 2017-10-02 DIAGNOSIS — C34.00 MALIGNANT NEOPLASM OF HILUS OF LUNG, UNSPECIFIED LATERALITY: ICD-10-CM

## 2017-10-02 PROCEDURE — 96415 CHEMO IV INFUSION ADDL HR: CPT | Mod: PN

## 2017-10-02 PROCEDURE — 96377 APPLICATON ON-BODY INJECTOR: CPT | Mod: PN

## 2017-10-02 PROCEDURE — 96413 CHEMO IV INFUSION 1 HR: CPT | Mod: PN

## 2017-10-02 PROCEDURE — 96372 THER/PROPH/DIAG INJ SC/IM: CPT | Mod: PN

## 2017-10-02 PROCEDURE — 96417 CHEMO IV INFUS EACH ADDL SEQ: CPT | Mod: PN

## 2017-10-02 PROCEDURE — 63600175 PHARM REV CODE 636 W HCPCS: Mod: PN | Performed by: INTERNAL MEDICINE

## 2017-10-02 PROCEDURE — 25000003 PHARM REV CODE 250: Mod: PN | Performed by: INTERNAL MEDICINE

## 2017-10-02 PROCEDURE — S0028 INJECTION, FAMOTIDINE, 20 MG: HCPCS | Mod: PN | Performed by: INTERNAL MEDICINE

## 2017-10-02 PROCEDURE — 96367 TX/PROPH/DG ADDL SEQ IV INF: CPT | Mod: PN

## 2017-10-02 RX ORDER — DIPHENHYDRAMINE HYDROCHLORIDE 50 MG/ML
50 INJECTION INTRAMUSCULAR; INTRAVENOUS ONCE AS NEEDED
Status: CANCELLED | OUTPATIENT
Start: 2017-10-02 | End: 2017-10-02

## 2017-10-02 RX ORDER — DIPHENHYDRAMINE HYDROCHLORIDE 50 MG/ML
50 INJECTION INTRAMUSCULAR; INTRAVENOUS ONCE AS NEEDED
Status: DISCONTINUED | OUTPATIENT
Start: 2017-10-02 | End: 2017-10-02 | Stop reason: HOSPADM

## 2017-10-02 RX ORDER — HEPARIN 100 UNIT/ML
500 SYRINGE INTRAVENOUS
Status: CANCELLED | OUTPATIENT
Start: 2017-10-02

## 2017-10-02 RX ORDER — FENTANYL 25 UG/1
1 PATCH TRANSDERMAL
Qty: 10 PATCH | Refills: 0 | Status: SHIPPED | OUTPATIENT
Start: 2017-10-02 | End: 2017-10-20 | Stop reason: SDUPTHER

## 2017-10-02 RX ORDER — HEPARIN 100 UNIT/ML
500 SYRINGE INTRAVENOUS
Status: DISCONTINUED | OUTPATIENT
Start: 2017-10-02 | End: 2017-10-02 | Stop reason: HOSPADM

## 2017-10-02 RX ORDER — EPINEPHRINE 0.3 MG/.3ML
0.3 INJECTION SUBCUTANEOUS ONCE AS NEEDED
Status: CANCELLED | OUTPATIENT
Start: 2017-10-02 | End: 2017-10-02

## 2017-10-02 RX ORDER — FAMOTIDINE 20 MG/50ML
20 INJECTION, SOLUTION INTRAVENOUS
Status: COMPLETED | OUTPATIENT
Start: 2017-10-02 | End: 2017-10-02

## 2017-10-02 RX ORDER — SODIUM CHLORIDE 0.9 % (FLUSH) 0.9 %
10 SYRINGE (ML) INJECTION
Status: DISCONTINUED | OUTPATIENT
Start: 2017-10-02 | End: 2017-10-02 | Stop reason: HOSPADM

## 2017-10-02 RX ORDER — SODIUM CHLORIDE 0.9 % (FLUSH) 0.9 %
10 SYRINGE (ML) INJECTION
Status: CANCELLED | OUTPATIENT
Start: 2017-10-02

## 2017-10-02 RX ORDER — FAMOTIDINE 20 MG/50ML
20 INJECTION, SOLUTION INTRAVENOUS
Status: CANCELLED
Start: 2017-10-02 | End: 2017-10-02

## 2017-10-02 RX ORDER — EPINEPHRINE 0.3 MG/.3ML
0.3 INJECTION SUBCUTANEOUS ONCE AS NEEDED
Status: DISCONTINUED | OUTPATIENT
Start: 2017-10-02 | End: 2017-10-02 | Stop reason: HOSPADM

## 2017-10-02 RX ADMIN — PALONOSETRON HYDROCHLORIDE: 0.25 INJECTION INTRAVENOUS at 11:10

## 2017-10-02 RX ADMIN — PACLITAXEL 288 MG: 6 INJECTION, SOLUTION, CONCENTRATE INTRAVENOUS at 11:10

## 2017-10-02 RX ADMIN — DIPHENHYDRAMINE HYDROCHLORIDE 50 MG: 50 INJECTION, SOLUTION INTRAMUSCULAR; INTRAVENOUS at 11:10

## 2017-10-02 RX ADMIN — CARBOPLATIN 545 MG: 10 INJECTION, SOLUTION INTRAVENOUS at 02:10

## 2017-10-02 RX ADMIN — SODIUM CHLORIDE: 900 INJECTION, SOLUTION INTRAVENOUS at 11:10

## 2017-10-02 RX ADMIN — HEPARIN 500 UNITS: 100 SYRINGE at 03:10

## 2017-10-02 RX ADMIN — PEGFILGRASTIM 6 MG: KIT SUBCUTANEOUS at 03:10

## 2017-10-02 RX ADMIN — FAMOTIDINE 20 MG: 20 INJECTION, SOLUTION INTRAVENOUS at 11:10

## 2017-10-02 NOTE — PROGRESS NOTES
Nutrition: Met with pts daughter today while patient was sleeping during chemo infusion. Pts daughter informed me Mrs. Clark is eating much better than she was last week. Wt: 127# Weight stable since last cycle. Desirae Serra MS, RD, LDN

## 2017-10-02 NOTE — NURSING
[id number] and carboplatin. Tolerated treatment. O2 at 2 liters NC. Neulasta on body injector to r arm.

## 2017-10-13 ENCOUNTER — TELEPHONE (OUTPATIENT)
Dept: HEMATOLOGY/ONCOLOGY | Facility: CLINIC | Age: 67
End: 2017-10-13

## 2017-10-13 DIAGNOSIS — C34.00 MALIGNANT NEOPLASM OF HILUS OF LUNG, UNSPECIFIED LATERALITY: Primary | ICD-10-CM

## 2017-10-13 DIAGNOSIS — R46.89 AGGRESSION: ICD-10-CM

## 2017-10-13 NOTE — TELEPHONE ENCOUNTER
Daughter, Evelin states patient is forgetful, has had 2 falls in the past few days that she is aware of and is now with aggressive behavior. Daughter is concerned that cancer has spread to brain. Called Dr. Brothers, discussed that it could also be the pain medication being too strong (daughter is aware of that scenario also), Ordered non-contrast ct of the brain, scheduled for Thursday 10/19/17 just prior to lab and PET scan already scheduled. Gave schedule to Evelin. Evelin voiced much appreciation.

## 2017-10-17 ENCOUNTER — PATIENT OUTREACH (OUTPATIENT)
Dept: ADMINISTRATIVE | Facility: HOSPITAL | Age: 67
End: 2017-10-17

## 2017-10-19 ENCOUNTER — HOSPITAL ENCOUNTER (OUTPATIENT)
Dept: RADIOLOGY | Facility: HOSPITAL | Age: 67
Discharge: HOME | End: 2017-10-19
Attending: INTERNAL MEDICINE
Payer: MEDICARE

## 2017-10-19 DIAGNOSIS — C34.00 MALIGNANT NEOPLASM OF HILUS OF LUNG, UNSPECIFIED LATERALITY: ICD-10-CM

## 2017-10-19 PROCEDURE — 70450 CT HEAD/BRAIN W/O DYE: CPT | Mod: 26,,, | Performed by: RADIOLOGY

## 2017-10-19 PROCEDURE — 78815 PET IMAGE W/CT SKULL-THIGH: CPT | Mod: 26,PS,, | Performed by: RADIOLOGY

## 2017-10-19 PROCEDURE — A9552 F18 FDG: HCPCS | Mod: PO

## 2017-10-19 PROCEDURE — 70450 CT HEAD/BRAIN W/O DYE: CPT | Mod: TC,PO

## 2017-10-20 ENCOUNTER — OFFICE VISIT (OUTPATIENT)
Dept: HEMATOLOGY/ONCOLOGY | Facility: CLINIC | Age: 67
End: 2017-10-20
Payer: MEDICARE

## 2017-10-20 VITALS
SYSTOLIC BLOOD PRESSURE: 99 MMHG | BODY MASS INDEX: 21.52 KG/M2 | TEMPERATURE: 98 F | DIASTOLIC BLOOD PRESSURE: 51 MMHG | WEIGHT: 129.19 LBS | HEIGHT: 65 IN | HEART RATE: 83 BPM | RESPIRATION RATE: 16 BRPM

## 2017-10-20 DIAGNOSIS — C34.00 MALIGNANT NEOPLASM OF HILUS OF LUNG, UNSPECIFIED LATERALITY: Primary | ICD-10-CM

## 2017-10-20 DIAGNOSIS — C50.011 MALIGNANT NEOPLASM OF NIPPLE OF RIGHT BREAST IN FEMALE, UNSPECIFIED ESTROGEN RECEPTOR STATUS: ICD-10-CM

## 2017-10-20 DIAGNOSIS — R52 PAIN: ICD-10-CM

## 2017-10-20 DIAGNOSIS — F31.9 BIPOLAR AFFECTIVE DISORDER: ICD-10-CM

## 2017-10-20 DIAGNOSIS — C34.00 MALIGNANT NEOPLASM OF HILUS OF LUNG, UNSPECIFIED LATERALITY: ICD-10-CM

## 2017-10-20 PROCEDURE — 99499 UNLISTED E&M SERVICE: CPT | Mod: S$PBB,,, | Performed by: INTERNAL MEDICINE

## 2017-10-20 PROCEDURE — 99999 PR PBB SHADOW E&M-EST. PATIENT-LVL III: CPT | Mod: PBBFAC,,, | Performed by: INTERNAL MEDICINE

## 2017-10-20 PROCEDURE — 99215 OFFICE O/P EST HI 40 MIN: CPT | Mod: S$GLB,,, | Performed by: INTERNAL MEDICINE

## 2017-10-20 RX ORDER — HYDROCODONE BITARTRATE AND ACETAMINOPHEN 5; 325 MG/1; MG/1
2 TABLET ORAL EVERY 4 HOURS PRN
Qty: 180 TABLET | Refills: 0 | Status: SHIPPED | OUTPATIENT
Start: 2017-10-20 | End: 2017-11-17 | Stop reason: SDUPTHER

## 2017-10-20 RX ORDER — ONDANSETRON 8 MG/1
8 TABLET, ORALLY DISINTEGRATING ORAL EVERY 12 HOURS PRN
Qty: 30 TABLET | Refills: 1 | Status: SHIPPED | OUTPATIENT
Start: 2017-10-20 | End: 2017-11-17 | Stop reason: SDUPTHER

## 2017-10-20 RX ORDER — METOPROLOL SUCCINATE 25 MG/1
TABLET, EXTENDED RELEASE ORAL
Qty: 30 TABLET | Refills: 2 | Status: ON HOLD | OUTPATIENT
Start: 2017-10-20 | End: 2018-01-08 | Stop reason: HOSPADM

## 2017-10-20 RX ORDER — LORAZEPAM 1 MG/1
TABLET ORAL
Qty: 90 TABLET | Refills: 2 | Status: SHIPPED | OUTPATIENT
Start: 2017-10-20 | End: 2018-01-22 | Stop reason: SDUPTHER

## 2017-10-20 RX ORDER — FENTANYL 25 UG/1
1 PATCH TRANSDERMAL
Qty: 10 PATCH | Refills: 0 | Status: SHIPPED | OUTPATIENT
Start: 2017-10-20 | End: 2017-11-17 | Stop reason: SDUPTHER

## 2017-10-20 NOTE — PROGRESS NOTES
This is a 66-year-old  woman known to me.  The patient has been   following from a diagnosis of breast cancer in 2007.  She was treated at [facility].  At that time, mitoxantrone and Cytoxan was used because of significant   extensive coronary artery disease and peripheral vascular disease.  The patient   underwent double mastectomy, radiation therapy and adjuvant therapy all at   [facility], under the guidance of Dr. Dowinng.  She was seen by me for B12   deficiency, hypertension, diabetes, dyslipidemia, coronary artery disease and   depression, and at the last visit, she had complained of feeling more tired and   sleeping a lot, and had not had a recent follow up with Dr. Ha and hence, we   sent her back for cardiac evaluation.  The patient had imaging study done, an   echocardiogram that showed a mass that was wrapping around the heart.  This   raised immediate suspicion.  The patient had been seen by Dr. Hopkins in lieu of   Dr. Mclaughlin, and the patient's family practice doctor, Dr. King, was   contacted.  We asked for an immediate biopsy of this mass.  The films were   discussed with Dr. Martin.  Interventional radiology-guided biopsy was   recommended.  The patient was sent for biopsy with Dr. Connolly at [facility].  Pathology comes back as squamous cell CA.  Pt started carbo / taxol chemotherapy here for discussion of recent PET With daughter, has been considering stopping  rx but very cheer ful today has gained weight   EXAMINATION:  Wt Readings from Last 3 Encounters:   10/02/17 57.6 kg (127 lb)   09/29/17 58.3 kg (128 lb 8.5 oz)   09/19/17 56.8 kg (125 lb 3.5 oz)     Temp Readings from Last 3 Encounters:   10/02/17 97.7 °F (36.5 °C)   09/29/17 98.2 °F (36.8 °C)   09/11/17 98.2 °F (36.8 °C)     BP Readings from Last 3 Encounters:   10/02/17 112/60   09/29/17 (!) 112/55   09/19/17 102/62     Pulse Readings from Last 3 Encounters:   10/02/17 64   09/29/17 64   09/19/17 103    GENERAL:  NAD  NEURO:  Awake, alert and oriented to time, person and place.  The patient   demonstrates no evidence of depression, anxiety or agitation.  Patient is   cooperative with exam and discussion.  EYES:  Normal conjunctivae and eyelids.  PERRLA 3 to 4 mm without icterus.  ENT AND MOUTH:  External appearance of ears and nose normal without scars,   lesions or masses.  Nasal mucosa, turbinates and septum are normal.  No ENT   drainage and dried blood noted.  No tenderness over frontal or maxillary sinus.    Lips and gums are normal.  Dentition is normal.  NECK:  Supple.  Trachea is central.  No crepitus.  No JVD.  No thyromegaly or   masses.   RESPIRATORY:  No intercostal retractions and no use of accessory muscles of   respirations noted.  No areas of dullness to percussion.  Chest is clear to   auscultation.  No rales, rhonchi or wheezes.  No crepitus.  Good air entry   bilaterally.  CARDIOVASCULAR:  No cardiomegaly.  Normal location.  No thrills or heaviness.    Normal carotid pulse.  No bruits.  S1 and S2 are normally heard without murmurs   or gallops.  All peripheral pulses, including pedal pulses, are present.  ABDOMEN:  Normal abdomen.  No hepatosplenomegaly.  No free fluid.  Bowel sounds   are present.  No hernia noted.  No masses.  No rebound or tenderness.  No   guarding or rigidity.   Umbilicus is midline.  LYMPHATICS:  No axillary, cervical, supraclavicular, submental, or inguinal   lymphadenopathy.  SKIN AND MUSCULOSKELETAL:  There is no evidence of excoriation marks or   ecchymosis.  No rashes.  No pigmented lesions, induration or subcutaneous   nodules.  No sores or abnormal moles.  No cyanosis.  No clubbing.  Nailbed   abnormalities are noted. No joint or skeletal deformities noted.  Normal range   of motion.  BREASTS:  No abnormal masses or discharge.  NEUROLOGIC:  Higher functions are appropriate.  No cranial nerve deficits.    Normal gait.  Normal strength.  Motor and sensory functions  are normal.  Deep   tendon reflexes are normal without Babinski's sign or ankle clonus.  GENITAL AND RECTAL:  Exams are deferred.    LABORATORIES:    Lab Results   Component Value Date    WBC 4.33 10/19/2017    HGB 8.5 (L) 10/19/2017    HCT 26.0 (L) 10/19/2017    MCV 88 10/19/2017    PLT 47 (L) 10/19/2017     CMP  Sodium   Date Value Ref Range Status   10/19/2017 145 136 - 145 mmol/L Final     Potassium   Date Value Ref Range Status   10/19/2017 3.3 (L) 3.5 - 5.1 mmol/L Final     Chloride   Date Value Ref Range Status   10/19/2017 105 95 - 110 mmol/L Final     CO2   Date Value Ref Range Status   10/19/2017 23 23 - 29 mmol/L Final     Glucose   Date Value Ref Range Status   10/19/2017 86 70 - 110 mg/dL Final     BUN, Bld   Date Value Ref Range Status   10/19/2017 7 (L) 8 - 23 mg/dL Final     Creatinine   Date Value Ref Range Status   10/19/2017 0.8 0.5 - 1.4 mg/dL Final     Calcium   Date Value Ref Range Status   10/19/2017 8.0 (L) 8.7 - 10.5 mg/dL Final     Total Protein   Date Value Ref Range Status   10/19/2017 7.1 6.0 - 8.4 g/dL Final     Albumin   Date Value Ref Range Status   10/19/2017 2.7 (L) 3.5 - 5.2 g/dL Final     Total Bilirubin   Date Value Ref Range Status   10/19/2017 0.5 0.1 - 1.0 mg/dL Final     Comment:     For infants and newborns, interpretation of results should be based  on gestational age, weight and in agreement with clinical  observations.  Premature Infant recommended reference ranges:  Up to 24 hours.............<8.0 mg/dL  Up to 48 hours............<12.0 mg/dL  3-5 days..................<15.0 mg/dL  6-29 days.................<15.0 mg/dL       Alkaline Phosphatase   Date Value Ref Range Status   10/19/2017 74 55 - 135 U/L Final     AST   Date Value Ref Range Status   10/19/2017 28 10 - 40 U/L Final     ALT   Date Value Ref Range Status   10/19/2017 18 10 - 44 U/L Final     Anion Gap   Date Value Ref Range Status   10/19/2017 17 (H) 8 - 16 mmol/L Final     eGFR if    Date  Value Ref Range Status   10/19/2017 >60 >60 mL/min/1.73 m^2 Final     eGFR if non    Date Value Ref Range Status   10/19/2017 >60 >60 mL/min/1.73 m^2 Final     Comment:     Calculation used to obtain the estimated glomerular filtration  rate (eGFR) is the CKD-EPI equation. Since race is unknown   in our information system, the eGFR values for   -American and Non--American patients are given   for each creatinine result.      PET 10/2017 shows improvement in size and hypermetabolic activity  IMPRESSION:  Stage IV squamous cell lung cancer with involvement around the   ventricle, but no other metastatic disease.    PLAN:  No rx today cont not better     rtc 1 weeks with cbc, cmp    cont CAD montioring also of HTN, lipids and Dm     neulasta for chemotherapy indusced neutropebnia prevention .   cont cardiologu f/u   add fentanyl pt not taking morphine , hydrocodone refilled

## 2017-10-27 ENCOUNTER — LAB VISIT (OUTPATIENT)
Dept: LAB | Facility: HOSPITAL | Age: 67
End: 2017-10-27
Attending: INTERNAL MEDICINE
Payer: MEDICARE

## 2017-10-27 ENCOUNTER — OFFICE VISIT (OUTPATIENT)
Dept: HEMATOLOGY/ONCOLOGY | Facility: CLINIC | Age: 67
End: 2017-10-27
Payer: MEDICARE

## 2017-10-27 VITALS
DIASTOLIC BLOOD PRESSURE: 53 MMHG | WEIGHT: 124.31 LBS | HEART RATE: 59 BPM | SYSTOLIC BLOOD PRESSURE: 101 MMHG | TEMPERATURE: 98 F | BODY MASS INDEX: 20.71 KG/M2 | HEIGHT: 65 IN | RESPIRATION RATE: 16 BRPM

## 2017-10-27 DIAGNOSIS — E78.5 DYSLIPIDEMIA: ICD-10-CM

## 2017-10-27 DIAGNOSIS — E08.00 DIABETES MELLITUS DUE TO UNDERLYING CONDITION WITH HYPEROSMOLARITY WITHOUT COMA, WITHOUT LONG-TERM CURRENT USE OF INSULIN: ICD-10-CM

## 2017-10-27 DIAGNOSIS — C34.00 MALIGNANT NEOPLASM OF HILUS OF LUNG, UNSPECIFIED LATERALITY: ICD-10-CM

## 2017-10-27 DIAGNOSIS — C34.00 MALIGNANT NEOPLASM OF HILUS OF LUNG, UNSPECIFIED LATERALITY: Primary | ICD-10-CM

## 2017-10-27 DIAGNOSIS — C50.011 MALIGNANT NEOPLASM OF NIPPLE OF RIGHT BREAST IN FEMALE, UNSPECIFIED ESTROGEN RECEPTOR STATUS: ICD-10-CM

## 2017-10-27 LAB
ALBUMIN SERPL BCP-MCNC: 3.3 G/DL
ALP SERPL-CCNC: 63 U/L
ALT SERPL W/O P-5'-P-CCNC: 28 U/L
ANION GAP SERPL CALC-SCNC: 10 MMOL/L
AST SERPL-CCNC: 27 U/L
BASOPHILS # BLD AUTO: 0.01 K/UL
BASOPHILS NFR BLD: 0.2 %
BILIRUB SERPL-MCNC: 0.6 MG/DL
BUN SERPL-MCNC: 9 MG/DL
CALCIUM SERPL-MCNC: 8 MG/DL
CHLORIDE SERPL-SCNC: 107 MMOL/L
CO2 SERPL-SCNC: 25 MMOL/L
CREAT SERPL-MCNC: 0.81 MG/DL
DIFFERENTIAL METHOD: ABNORMAL
EOSINOPHIL # BLD AUTO: 0.1 K/UL
EOSINOPHIL NFR BLD: 2.5 %
ERYTHROCYTE [DISTWIDTH] IN BLOOD BY AUTOMATED COUNT: 19.4 %
EST. GFR  (AFRICAN AMERICAN): >60 ML/MIN/1.73 M^2
EST. GFR  (NON AFRICAN AMERICAN): >60 ML/MIN/1.73 M^2
GLUCOSE SERPL-MCNC: 123 MG/DL
HCT VFR BLD AUTO: 33.4 %
HGB BLD-MCNC: 10.9 G/DL
LYMPHOCYTES # BLD AUTO: 2.1 K/UL
LYMPHOCYTES NFR BLD: 47.7 %
MCH RBC QN AUTO: 29.8 PG
MCHC RBC AUTO-ENTMCNC: 32.6 G/DL
MCV RBC AUTO: 91 FL
MONOCYTES # BLD AUTO: 0.5 K/UL
MONOCYTES NFR BLD: 11 %
NEUTROPHILS # BLD AUTO: 1.7 K/UL
NEUTROPHILS NFR BLD: 38.6 %
NRBC BLD-RTO: 0 /100 WBC
PLATELET # BLD AUTO: 167 K/UL
PMV BLD AUTO: 11 FL
POTASSIUM SERPL-SCNC: 3.6 MMOL/L
PROT SERPL-MCNC: 7 G/DL
RBC # BLD AUTO: 3.66 M/UL
SODIUM SERPL-SCNC: 142 MMOL/L
WBC # BLD AUTO: 4.38 K/UL

## 2017-10-27 PROCEDURE — 85025 COMPLETE CBC W/AUTO DIFF WBC: CPT | Mod: PN

## 2017-10-27 PROCEDURE — 99999 PR PBB SHADOW E&M-EST. PATIENT-LVL III: CPT | Mod: PBBFAC,,, | Performed by: INTERNAL MEDICINE

## 2017-10-27 PROCEDURE — 99499 UNLISTED E&M SERVICE: CPT | Mod: S$PBB,,, | Performed by: INTERNAL MEDICINE

## 2017-10-27 PROCEDURE — 99215 OFFICE O/P EST HI 40 MIN: CPT | Mod: S$GLB,,, | Performed by: INTERNAL MEDICINE

## 2017-10-27 PROCEDURE — 80053 COMPREHEN METABOLIC PANEL: CPT

## 2017-10-27 PROCEDURE — 80053 COMPREHEN METABOLIC PANEL: CPT | Mod: PN

## 2017-10-27 PROCEDURE — 36415 COLL VENOUS BLD VENIPUNCTURE: CPT | Mod: PN

## 2017-10-27 PROCEDURE — 85025 COMPLETE CBC W/AUTO DIFF WBC: CPT

## 2017-10-27 RX ORDER — EPINEPHRINE 0.3 MG/.3ML
0.3 INJECTION SUBCUTANEOUS ONCE AS NEEDED
Status: CANCELLED | OUTPATIENT
Start: 2017-10-27 | End: 2017-10-27

## 2017-10-27 RX ORDER — DIPHENHYDRAMINE HYDROCHLORIDE 50 MG/ML
50 INJECTION INTRAMUSCULAR; INTRAVENOUS ONCE AS NEEDED
Status: CANCELLED | OUTPATIENT
Start: 2017-10-27 | End: 2017-10-27

## 2017-10-27 RX ORDER — HEPARIN 100 UNIT/ML
500 SYRINGE INTRAVENOUS
Status: CANCELLED | OUTPATIENT
Start: 2017-10-27

## 2017-10-27 RX ORDER — SODIUM CHLORIDE 0.9 % (FLUSH) 0.9 %
10 SYRINGE (ML) INJECTION
Status: CANCELLED | OUTPATIENT
Start: 2017-10-27

## 2017-10-27 RX ORDER — FAMOTIDINE 20 MG/50ML
20 INJECTION, SOLUTION INTRAVENOUS
Status: CANCELLED
Start: 2017-10-27 | End: 2017-10-27

## 2017-10-27 NOTE — PROGRESS NOTES
This is a 66-year-old  woman known to me.  The patient has been   following from a diagnosis of breast cancer in 2007.  She was treated at [facility].  At that time, mitoxantrone and Cytoxan was used because of significant   extensive coronary artery disease and peripheral vascular disease.  The patient   underwent double mastectomy, radiation therapy and adjuvant therapy all at   [facility], under the guidance of Dr. Downing.  She was seen by me for B12   deficiency, hypertension, diabetes, dyslipidemia, coronary artery disease and   depression, and at the last visit, she had complained of feeling more tired and   sleeping a lot, and had not had a recent follow up with Dr. Ha and hence, we   sent her back for cardiac evaluation.  The patient had imaging study done, an   echocardiogram that showed a mass that was wrapping around the heart.  This   raised immediate suspicion.  The patient had been seen by Dr. Hopkins in lieu of   Dr. Mclaughlin, and the patient's family practice doctor, Dr. King, was   contacted.  We asked for an immediate biopsy of this mass.  The films were   discussed with Dr. Martin.  Interventional radiology-guided biopsy was   recommended.  The patient was sent for biopsy with Dr. Connolly at [facility].  Pathology comes back as squamous cell CA.  Pt started carbo / taxol  Rpt PET showing improvement , given a week delay tgo get couints up, continuing chemo   EXAMINATION:  Wt Readings from Last 3 Encounters:   10/27/17 56.4 kg (124 lb 5.4 oz)   10/20/17 58.6 kg (129 lb 3 oz)   10/02/17 57.6 kg (127 lb)     Temp Readings from Last 3 Encounters:   10/27/17 98.2 °F (36.8 °C)   10/23/17 98.2 °F (36.8 °C) (Oral)   10/20/17 98.2 °F (36.8 °C)     BP Readings from Last 3 Encounters:   10/27/17 (!) 101/53   10/23/17 (!) 121/58   10/20/17 (!) 99/51     Pulse Readings from Last 3 Encounters:   10/27/17 (!) 59   10/23/17 62   10/20/17 83   GENERAL:  NAD  NEURO:  Awake, alert  and oriented to time, person and place.  The patient   demonstrates no evidence of depression, anxiety or agitation.  Patient is   cooperative with exam and discussion.  EYES:  Normal conjunctivae and eyelids.  PERRLA 3 to 4 mm without icterus.  ENT AND MOUTH:  External appearance of ears and nose normal without scars,   lesions or masses.  Nasal mucosa, turbinates and septum are normal.  No ENT   drainage and dried blood noted.  No tenderness over frontal or maxillary sinus.    Lips and gums are normal.  Dentition is normal.  NECK:  Supple.  Trachea is central.  No crepitus.  No JVD.  No thyromegaly or   masses.   RESPIRATORY:  No intercostal retractions and no use of accessory muscles of   respirations noted.  No areas of dullness to percussion.  Chest is clear to   auscultation.  No rales, rhonchi or wheezes.  No crepitus.  Good air entry   bilaterally.  CARDIOVASCULAR:  No cardiomegaly.  Normal location.  No thrills or heaviness.    Normal carotid pulse.  No bruits.  S1 and S2 are normally heard without murmurs   or gallops.  All peripheral pulses, including pedal pulses, are present.  ABDOMEN:  Normal abdomen.  No hepatosplenomegaly.  No free fluid.  Bowel sounds   are present.  No hernia noted.  No masses.  No rebound or tenderness.  No   guarding or rigidity.   Umbilicus is midline.  LYMPHATICS:  No axillary, cervical, supraclavicular, submental, or inguinal   lymphadenopathy.  SKIN AND MUSCULOSKELETAL:  There is no evidence of excoriation marks or   ecchymosis.  No rashes.  No pigmented lesions, induration or subcutaneous   nodules.  No sores or abnormal moles.  No cyanosis.  No clubbing.  Nailbed   abnormalities are noted. No joint or skeletal deformities noted.  Normal range   of motion.  BREASTS:  No abnormal masses or discharge.  NEUROLOGIC:  Higher functions are appropriate.  No cranial nerve deficits.    Normal gait.  Normal strength.  Motor and sensory functions are normal.  Deep   tendon reflexes are  normal without Babinski's sign or ankle clonus.  GENITAL AND RECTAL:  Exams are deferred.    LABORATORIES:    Lab Results   Component Value Date    WBC 4.38 10/27/2017    HGB 10.9 (L) 10/27/2017    HCT 33.4 (L) 10/27/2017    MCV 91 10/27/2017     10/27/2017     CMP  Sodium   Date Value Ref Range Status   10/27/2017 142 136 - 145 mmol/L Final     Potassium   Date Value Ref Range Status   10/27/2017 3.6 3.5 - 5.1 mmol/L Final     Chloride   Date Value Ref Range Status   10/27/2017 107 95 - 110 mmol/L Final     CO2   Date Value Ref Range Status   10/27/2017 25 22 - 31 mmol/L Final     Glucose   Date Value Ref Range Status   10/27/2017 123 (H) 70 - 110 mg/dL Final     Comment:     The ADA recommends the following guidelines for fasting glucose:  Normal:       less than 100 mg/dL  Prediabetes:  100 mg/dL to 125 mg/dL  Diabetes:     126 mg/dL or higher       BUN, Bld   Date Value Ref Range Status   10/27/2017 9 7 - 18 mg/dL Final     Creatinine   Date Value Ref Range Status   10/27/2017 0.81 0.50 - 1.40 mg/dL Final     Calcium   Date Value Ref Range Status   10/27/2017 8.0 (L) 8.4 - 10.2 mg/dL Final     Total Protein   Date Value Ref Range Status   10/27/2017 7.0 6.0 - 8.4 g/dL Final     Albumin   Date Value Ref Range Status   10/27/2017 3.3 (L) 3.5 - 5.2 g/dL Final     Total Bilirubin   Date Value Ref Range Status   10/27/2017 0.6 0.2 - 1.3 mg/dL Final     Alkaline Phosphatase   Date Value Ref Range Status   10/27/2017 63 38 - 145 U/L Final     AST   Date Value Ref Range Status   10/27/2017 27 14 - 36 U/L Final     ALT   Date Value Ref Range Status   10/27/2017 28 10 - 44 U/L Final     Anion Gap   Date Value Ref Range Status   10/27/2017 10 8 - 16 mmol/L Final     eGFR if    Date Value Ref Range Status   10/27/2017 >60 >60 mL/min/1.73 m^2 Final     eGFR if non    Date Value Ref Range Status   10/27/2017 >60 >60 mL/min/1.73 m^2 Final     Comment:     Calculation used to obtain the  estimated glomerular filtration  rate (eGFR) is the CKD-EPI equation. Since race is unknown   in our information system, the eGFR values for   -American and Non--American patients are given   for each creatinine result.      PET 10/2017 shows improvement in size and hypermetabolic activity  IMPRESSION:  Stage IV squamous cell lung cancer with involvement around the   ventricle, but no other metastatic disease.    PLAN:   proceed with carbo taxol  rtc 3 weeks for next cycle with cbc, cmp  cont CAD montioring also of HTN, lipids and Dm     neulasta for chemotherapy indusced neutropebnia prevention .   cont cardiologu f/u

## 2017-10-30 ENCOUNTER — INFUSION (OUTPATIENT)
Dept: INFUSION THERAPY | Facility: HOSPITAL | Age: 67
End: 2017-10-30
Attending: INTERNAL MEDICINE
Payer: MEDICARE

## 2017-10-30 VITALS
HEART RATE: 78 BPM | BODY MASS INDEX: 21.93 KG/M2 | HEIGHT: 65 IN | TEMPERATURE: 98 F | WEIGHT: 131.63 LBS | SYSTOLIC BLOOD PRESSURE: 122 MMHG | RESPIRATION RATE: 16 BRPM | DIASTOLIC BLOOD PRESSURE: 67 MMHG | OXYGEN SATURATION: 99 %

## 2017-10-30 DIAGNOSIS — C34.00 MALIGNANT NEOPLASM OF HILUS OF LUNG, UNSPECIFIED LATERALITY: Primary | ICD-10-CM

## 2017-10-30 PROCEDURE — 96413 CHEMO IV INFUSION 1 HR: CPT | Mod: PN

## 2017-10-30 PROCEDURE — 25000003 PHARM REV CODE 250: Mod: PN | Performed by: INTERNAL MEDICINE

## 2017-10-30 PROCEDURE — 63600175 PHARM REV CODE 636 W HCPCS: Mod: PN | Performed by: INTERNAL MEDICINE

## 2017-10-30 PROCEDURE — A4216 STERILE WATER/SALINE, 10 ML: HCPCS | Mod: PN | Performed by: INTERNAL MEDICINE

## 2017-10-30 PROCEDURE — 96377 APPLICATON ON-BODY INJECTOR: CPT | Mod: PN

## 2017-10-30 PROCEDURE — 96417 CHEMO IV INFUS EACH ADDL SEQ: CPT | Mod: PN

## 2017-10-30 PROCEDURE — S0028 INJECTION, FAMOTIDINE, 20 MG: HCPCS | Mod: PN | Performed by: INTERNAL MEDICINE

## 2017-10-30 PROCEDURE — 96367 TX/PROPH/DG ADDL SEQ IV INF: CPT | Mod: PN

## 2017-10-30 RX ORDER — DIPHENHYDRAMINE HYDROCHLORIDE 50 MG/ML
50 INJECTION INTRAMUSCULAR; INTRAVENOUS ONCE AS NEEDED
Status: ACTIVE | OUTPATIENT
Start: 2017-10-30 | End: 2017-10-30

## 2017-10-30 RX ORDER — EPINEPHRINE 0.3 MG/.3ML
0.3 INJECTION SUBCUTANEOUS ONCE AS NEEDED
Status: ACTIVE | OUTPATIENT
Start: 2017-10-30 | End: 2017-10-30

## 2017-10-30 RX ORDER — SODIUM CHLORIDE 0.9 % (FLUSH) 0.9 %
10 SYRINGE (ML) INJECTION
Status: DISCONTINUED | OUTPATIENT
Start: 2017-10-30 | End: 2017-10-31 | Stop reason: HOSPADM

## 2017-10-30 RX ORDER — FAMOTIDINE 20 MG/50ML
20 INJECTION, SOLUTION INTRAVENOUS
Status: COMPLETED | OUTPATIENT
Start: 2017-10-30 | End: 2017-10-30

## 2017-10-30 RX ORDER — HEPARIN 100 UNIT/ML
500 SYRINGE INTRAVENOUS
Status: DISCONTINUED | OUTPATIENT
Start: 2017-10-30 | End: 2017-10-31 | Stop reason: HOSPADM

## 2017-10-30 RX ADMIN — FAMOTIDINE 20 MG: 20 INJECTION, SOLUTION INTRAVENOUS at 10:10

## 2017-10-30 RX ADMIN — SODIUM CHLORIDE: 900 INJECTION, SOLUTION INTRAVENOUS at 10:10

## 2017-10-30 RX ADMIN — SODIUM CHLORIDE, PRESERVATIVE FREE 10 ML: 5 INJECTION INTRAVENOUS at 10:10

## 2017-10-30 RX ADMIN — PEGFILGRASTIM 6 MG: KIT SUBCUTANEOUS at 03:10

## 2017-10-30 RX ADMIN — CARBOPLATIN 545 MG: 10 INJECTION, SOLUTION INTRAVENOUS at 02:10

## 2017-10-30 RX ADMIN — PALONOSETRON HYDROCHLORIDE: 0.25 INJECTION INTRAVENOUS at 10:10

## 2017-10-30 RX ADMIN — PACLITAXEL 288 MG: 6 INJECTION, SOLUTION, CONCENTRATE INTRAVENOUS at 11:10

## 2017-10-30 RX ADMIN — DIPHENHYDRAMINE HYDROCHLORIDE 50 MG: 50 INJECTION, SOLUTION INTRAMUSCULAR; INTRAVENOUS at 11:10

## 2017-11-06 ENCOUNTER — TELEPHONE (OUTPATIENT)
Dept: HEMATOLOGY/ONCOLOGY | Facility: CLINIC | Age: 67
End: 2017-11-06

## 2017-11-06 ENCOUNTER — LAB VISIT (OUTPATIENT)
Dept: LAB | Facility: HOSPITAL | Age: 67
End: 2017-11-06
Attending: INTERNAL MEDICINE
Payer: MEDICARE

## 2017-11-06 DIAGNOSIS — D50.8 OTHER IRON DEFICIENCY ANEMIA: ICD-10-CM

## 2017-11-06 DIAGNOSIS — D50.8 OTHER IRON DEFICIENCY ANEMIA: Primary | ICD-10-CM

## 2017-11-06 LAB
ALBUMIN SERPL BCP-MCNC: 2.9 G/DL
ALP SERPL-CCNC: 70 U/L
ALT SERPL W/O P-5'-P-CCNC: 15 U/L
ANION GAP SERPL CALC-SCNC: 15 MMOL/L
ANISOCYTOSIS BLD QL SMEAR: SLIGHT
AST SERPL-CCNC: 26 U/L
BASOPHILS # BLD AUTO: ABNORMAL K/UL
BASOPHILS NFR BLD: 0 %
BILIRUB SERPL-MCNC: 0.6 MG/DL
BUN SERPL-MCNC: 17 MG/DL
CALCIUM SERPL-MCNC: 9.2 MG/DL
CHLORIDE SERPL-SCNC: 99 MMOL/L
CO2 SERPL-SCNC: 28 MMOL/L
CREAT SERPL-MCNC: 1 MG/DL
DIFFERENTIAL METHOD: ABNORMAL
DOHLE BOD BLD QL SMEAR: PRESENT
EOSINOPHIL # BLD AUTO: ABNORMAL K/UL
EOSINOPHIL NFR BLD: 2 %
ERYTHROCYTE [DISTWIDTH] IN BLOOD BY AUTOMATED COUNT: 18.5 %
EST. GFR  (AFRICAN AMERICAN): >60 ML/MIN/1.73 M^2
EST. GFR  (NON AFRICAN AMERICAN): 59 ML/MIN/1.73 M^2
GLUCOSE SERPL-MCNC: 97 MG/DL
HCT VFR BLD AUTO: 29.6 %
HGB BLD-MCNC: 9.7 G/DL
LYMPHOCYTES # BLD AUTO: ABNORMAL K/UL
LYMPHOCYTES NFR BLD: 62 %
MCH RBC QN AUTO: 29.6 PG
MCHC RBC AUTO-ENTMCNC: 32.8 G/DL
MCV RBC AUTO: 90 FL
MONOCYTES # BLD AUTO: ABNORMAL K/UL
MONOCYTES NFR BLD: 25 %
NEUTROPHILS NFR BLD: 9 %
NEUTS BAND NFR BLD MANUAL: 2 %
PLATELET # BLD AUTO: 71 K/UL
PLATELET BLD QL SMEAR: ABNORMAL
PMV BLD AUTO: 11.8 FL
POTASSIUM SERPL-SCNC: 4.4 MMOL/L
PROT SERPL-MCNC: 6.9 G/DL
RBC # BLD AUTO: 3.28 M/UL
SODIUM SERPL-SCNC: 142 MMOL/L
WBC # BLD AUTO: 2.81 K/UL

## 2017-11-06 PROCEDURE — 80053 COMPREHEN METABOLIC PANEL: CPT | Mod: PO

## 2017-11-06 PROCEDURE — 85007 BL SMEAR W/DIFF WBC COUNT: CPT | Mod: PO

## 2017-11-06 PROCEDURE — 36415 COLL VENOUS BLD VENIPUNCTURE: CPT | Mod: PO

## 2017-11-06 PROCEDURE — 85027 COMPLETE CBC AUTOMATED: CPT | Mod: PO

## 2017-11-06 NOTE — TELEPHONE ENCOUNTER
----- Message from Alina Brothers MD sent at 11/6/2017  1:10 PM CST -----  She should have labs cbc, cmp drawn , if worse symptoms go to er

## 2017-11-06 NOTE — TELEPHONE ENCOUNTER
Rtc to daughter Evelin. Pt has had diarrhea all weekend and the immodium is not working. She also is having jerking movements to all extremities , can not hold a glass, never had before. She is also having reoccurring nose bleeds. In the past had to have blood transfusions. She is able to get around with her walker, but not able to make it to the bathroom for diarrhea. Denied any elevated temp or N&V. Please review and advise. Thanks Tahira

## 2017-11-06 NOTE — TELEPHONE ENCOUNTER
----- Message from Darian Chavarria sent at 11/6/2017  9:01 AM CST -----  Contact: pt's daughter Evelin   Pt's daughter is requesting a callback, would like to speak with nurse about some jerking that her mom is doing  Call Back#[phone]  Thanks

## 2017-11-06 NOTE — TELEPHONE ENCOUNTER
I rtc and notified Evelin of Dr Brothers's order and she agreed to go this afternoon and get labs. Cm  Dr Brothers please be on the look out for the results. Tahira

## 2017-11-17 ENCOUNTER — TELEPHONE (OUTPATIENT)
Dept: INFUSION THERAPY | Facility: HOSPITAL | Age: 67
End: 2017-11-17

## 2017-11-17 ENCOUNTER — TELEPHONE (OUTPATIENT)
Dept: HEMATOLOGY/ONCOLOGY | Facility: CLINIC | Age: 67
End: 2017-11-17

## 2017-11-17 ENCOUNTER — OFFICE VISIT (OUTPATIENT)
Dept: HEMATOLOGY/ONCOLOGY | Facility: CLINIC | Age: 67
End: 2017-11-17
Payer: MEDICARE

## 2017-11-17 VITALS
BODY MASS INDEX: 21.16 KG/M2 | HEIGHT: 65 IN | HEART RATE: 83 BPM | WEIGHT: 127 LBS | TEMPERATURE: 98 F | SYSTOLIC BLOOD PRESSURE: 108 MMHG | RESPIRATION RATE: 14 BRPM | DIASTOLIC BLOOD PRESSURE: 53 MMHG

## 2017-11-17 DIAGNOSIS — C34.00 MALIGNANT NEOPLASM OF HILUS OF LUNG, UNSPECIFIED LATERALITY: ICD-10-CM

## 2017-11-17 DIAGNOSIS — E53.8 B12 DEFICIENCY: Primary | ICD-10-CM

## 2017-11-17 DIAGNOSIS — C50.011 MALIGNANT NEOPLASM OF NIPPLE OF RIGHT BREAST IN FEMALE, UNSPECIFIED ESTROGEN RECEPTOR STATUS: ICD-10-CM

## 2017-11-17 DIAGNOSIS — R52 PAIN: ICD-10-CM

## 2017-11-17 DIAGNOSIS — Z95.1 S/P CABG (CORONARY ARTERY BYPASS GRAFT): ICD-10-CM

## 2017-11-17 PROCEDURE — 99499 UNLISTED E&M SERVICE: CPT | Mod: S$PBB,,, | Performed by: INTERNAL MEDICINE

## 2017-11-17 PROCEDURE — 99214 OFFICE O/P EST MOD 30 MIN: CPT | Mod: S$GLB,,, | Performed by: INTERNAL MEDICINE

## 2017-11-17 PROCEDURE — 99999 PR PBB SHADOW E&M-EST. PATIENT-LVL III: CPT | Mod: PBBFAC,,, | Performed by: INTERNAL MEDICINE

## 2017-11-17 RX ORDER — HYDROCODONE BITARTRATE AND ACETAMINOPHEN 5; 325 MG/1; MG/1
2 TABLET ORAL EVERY 4 HOURS PRN
Qty: 180 TABLET | Refills: 0 | Status: SHIPPED | OUTPATIENT
Start: 2017-11-17 | End: 2017-12-22 | Stop reason: SDUPTHER

## 2017-11-17 RX ORDER — LIDOCAINE AND PRILOCAINE 25; 25 MG/G; MG/G
CREAM TOPICAL
Qty: 5 G | Refills: 0 | Status: SHIPPED | OUTPATIENT
Start: 2017-11-17

## 2017-11-17 RX ORDER — PROCHLORPERAZINE MALEATE 10 MG
10 TABLET ORAL EVERY 6 HOURS PRN
Qty: 30 TABLET | Refills: 1 | Status: SHIPPED | OUTPATIENT
Start: 2017-11-17 | End: 2018-11-17

## 2017-11-17 RX ORDER — FENTANYL 25 UG/1
1 PATCH TRANSDERMAL
Qty: 10 PATCH | Refills: 0 | Status: SHIPPED | OUTPATIENT
Start: 2017-11-17 | End: 2017-11-20 | Stop reason: SDUPTHER

## 2017-11-17 RX ORDER — GABAPENTIN 300 MG/1
300 CAPSULE ORAL 2 TIMES DAILY
Qty: 60 CAPSULE | Refills: 11 | Status: SHIPPED | OUTPATIENT
Start: 2017-11-17 | End: 2018-02-15 | Stop reason: SDUPTHER

## 2017-11-17 RX ORDER — ONDANSETRON 8 MG/1
8 TABLET, ORALLY DISINTEGRATING ORAL EVERY 12 HOURS PRN
Qty: 30 TABLET | Refills: 1 | Status: SHIPPED | OUTPATIENT
Start: 2017-11-17 | End: 2017-11-24 | Stop reason: SDUPTHER

## 2017-11-17 NOTE — TELEPHONE ENCOUNTER
no treatment on monday for mrs castro  mrn [id number]  [11/17/2017 11:09 AM] Piedad Steele:   are we delay 1 week   [11/17/2017 11:09 AM] Danette Vaz:   change of plan

## 2017-11-17 NOTE — PROGRESS NOTES
This is a 66-year-old  woman known to me.  The patient has been   following from a diagnosis of breast cancer in 2007.  She was treated at [facility].  At that time, mitoxantrone and Cytoxan was used because of significant   extensive coronary artery disease and peripheral vascular disease.  The patient   underwent double mastectomy, radiation therapy and adjuvant therapy all at   [facility], under the guidance of Dr. Downing.  She was seen by me for B12   deficiency, hypertension, diabetes, dyslipidemia, coronary artery disease and   depression, and at the last visit, she had complained of feeling more tired and   sleeping a lot, and had not had a recent follow up with Dr. Ha and hence, we   sent her back for cardiac evaluation.  The patient had imaging study done, an   echocardiogram that showed a mass that was wrapping around the heart.  This   raised immediate suspicion.  The patient had been seen by Dr. Hopkins in lieu of   Dr. Mclaughlin, and the patient's family practice doctor, Dr. King, was   contacted.  We asked for an immediate biopsy of this mass.  The films were   discussed with Dr. Martin.  Interventional radiology-guided biopsy was   recommended.  The patient was sent for biopsy with [place]. [place]olly [place].  Pathology comes back as squamous cell CA.  Pt started carbo / taxol  Rpt PET showing improvement , given a week delay tgo get couints up, continuing chemo here for f/u   she has had a difficult time with loose stools and fatigue past 3 weeks   EXAMINATION:  Wt Readings from Last 3 Encounters:   10/30/17 59.7 kg (131 lb 9.6 oz)   10/27/17 56.4 kg (124 lb 5.4 oz)   10/20/17 58.6 kg (129 lb 3 oz)     Temp Readings from Last 3 Encounters:   10/30/17 97.9 °F (36.6 °C)   10/27/17 98.2 °F (36.8 °C)   10/23/17 98.2 °F (36.8 °C) (Oral)     BP Readings from Last 3 Encounters:   10/30/17 122/67   10/27/17 (!) 101/53   10/23/17 (!) 121/58     Pulse Readings from Last 3  Encounters:   10/30/17 78   10/27/17 (!) 59   10/23/17 62   GENERAL:  NAD  NEURO:  Awake, alert and oriented to time, person and place.  The patient   demonstrates no evidence of depression, anxiety or agitation.  Patient is   cooperative with exam and discussion.  EYES:  Normal conjunctivae and eyelids.  PERRLA 3 to 4 mm without icterus.  ENT AND MOUTH:  External appearance of ears and nose normal without scars,   lesions or masses.  Nasal mucosa, turbinates and septum are normal.  No ENT   drainage and dried blood noted.  No tenderness over frontal or maxillary sinus.    Lips and gums are normal.  Dentition is normal.  NECK:  Supple.  Trachea is central.  No crepitus.  No JVD.  No thyromegaly or   masses.   RESPIRATORY:  No intercostal retractions and no use of accessory muscles of   respirations noted.  No areas of dullness to percussion.  Chest is clear to   auscultation.  No rales, rhonchi or wheezes.  No crepitus.  Good air entry   bilaterally.  CARDIOVASCULAR:  No cardiomegaly.  Normal location.  No thrills or heaviness.    Normal carotid pulse.  No bruits.  S1 and S2 are normally heard without murmurs   or gallops.  All peripheral pulses, including pedal pulses, are present.  ABDOMEN:  Normal abdomen.  No hepatosplenomegaly.  No free fluid.  Bowel sounds   are present.  No hernia noted.  No masses.  No rebound or tenderness.  No   guarding or rigidity.   Umbilicus is midline.  LYMPHATICS:  No axillary, cervical, supraclavicular, submental, or inguinal   lymphadenopathy.  SKIN AND MUSCULOSKELETAL:  There is no evidence of excoriation marks or   ecchymosis.  No rashes.  No pigmented lesions, induration or subcutaneous   nodules.  No sores or abnormal moles.  No cyanosis.  No clubbing.  Nailbed   abnormalities are noted. No joint or skeletal deformities noted.  Normal range   of motion.  BREASTS:  No abnormal masses or discharge.  NEUROLOGIC:  Higher functions are appropriate.  No cranial nerve deficits.     Normal gait.  Normal strength.  Motor and sensory functions are normal.  Deep   tendon reflexes are normal without Babinski's sign or ankle clonus.  GENITAL AND RECTAL:  Exams are deferred.    LABORATORIES:    Lab Results   Component Value Date    WBC 8.38 11/17/2017    HGB 8.4 (L) 11/17/2017    HCT 26.0 (L) 11/17/2017    MCV 93 11/17/2017    PLT 64 (L) 11/17/2017     CMP  Sodium   Date Value Ref Range Status   11/17/2017 141 136 - 145 mmol/L Final     Potassium   Date Value Ref Range Status   11/17/2017 3.4 (L) 3.5 - 5.1 mmol/L Final     Chloride   Date Value Ref Range Status   11/17/2017 102 95 - 110 mmol/L Final     CO2   Date Value Ref Range Status   11/17/2017 26 22 - 31 mmol/L Final     Glucose   Date Value Ref Range Status   11/17/2017 120 (H) 70 - 110 mg/dL Final     Comment:     The ADA recommends the following guidelines for fasting glucose:  Normal:       less than 100 mg/dL  Prediabetes:  100 mg/dL to 125 mg/dL  Diabetes:     126 mg/dL or higher       BUN, Bld   Date Value Ref Range Status   11/17/2017 10 7 - 18 mg/dL Final     Creatinine   Date Value Ref Range Status   11/17/2017 0.88 0.50 - 1.40 mg/dL Final     Calcium   Date Value Ref Range Status   11/17/2017 7.1 (L) 8.4 - 10.2 mg/dL Final     Total Protein   Date Value Ref Range Status   11/17/2017 7.2 6.0 - 8.4 g/dL Final     Albumin   Date Value Ref Range Status   11/17/2017 3.7 3.5 - 5.2 g/dL Final     Total Bilirubin   Date Value Ref Range Status   11/17/2017 0.7 0.2 - 1.3 mg/dL Final     Alkaline Phosphatase   Date Value Ref Range Status   11/17/2017 86 38 - 145 U/L Final     AST   Date Value Ref Range Status   11/17/2017 36 14 - 36 U/L Final     ALT   Date Value Ref Range Status   11/17/2017 29 10 - 44 U/L Final     Anion Gap   Date Value Ref Range Status   11/17/2017 13 8 - 16 mmol/L Final     eGFR if    Date Value Ref Range Status   11/17/2017 >60 >60 mL/min/1.73 m^2 Final     eGFR if non    Date Value Ref  Range Status   11/17/2017 >60 >60 mL/min/1.73 m^2 Final     Comment:     Calculation used to obtain the estimated glomerular filtration  rate (eGFR) is the CKD-EPI equation.       PET 10/2017 shows improvement in size and hypermetabolic activity  IMPRESSION:  Stage IV squamous cell lung cancer with involvement around the   ventricle, but no other metastatic disease.    PLAN:   will dc carbo/ taxol and see if oral afatinib can be started due to completion of platin drug and toxicities related to infusion  rtc after thanksgiving for start if approvals obtained  cont CAD montioring also of HTN, lipids and Dm       cont cardiologu f/u

## 2017-11-18 NOTE — TELEPHONE ENCOUNTER
----- Message from Rukhsana Vitale sent at 11/17/2017  4:04 PM CST -----  Contact: daughter  Daughter - Evelin Russell [phone] is calling to say that [facility] did not have the prescription for the Fentanyl Patches/please call as soon as possible for patient and advise daughter when sent

## 2017-11-20 DIAGNOSIS — C34.00 MALIGNANT NEOPLASM OF HILUS OF LUNG, UNSPECIFIED LATERALITY: ICD-10-CM

## 2017-11-20 RX ORDER — FENTANYL 25 UG/1
1 PATCH TRANSDERMAL
Qty: 10 PATCH | Refills: 0 | Status: SHIPPED | OUTPATIENT
Start: 2017-11-20 | End: 2018-01-19 | Stop reason: SDUPTHER

## 2017-11-21 ENCOUNTER — TELEPHONE (OUTPATIENT)
Dept: PHARMACY | Facility: HOSPITAL | Age: 67
End: 2017-11-21

## 2017-11-21 NOTE — TELEPHONE ENCOUNTER
Informed patient [facility] received a prescription for Gilotrif and it will require a prior authorization with their insurance company. We will update patient of status as more information is received.

## 2017-11-22 NOTE — TELEPHONE ENCOUNTER
DOCUMENTATION ONLY   PA was submitted to the insurance  11/24/2017  PA approved until 11/21/2018  Copay $3.70

## 2017-11-24 ENCOUNTER — TELEPHONE (OUTPATIENT)
Dept: PHARMACY | Facility: CLINIC | Age: 67
End: 2017-11-24

## 2017-11-24 DIAGNOSIS — C34.00 MALIGNANT NEOPLASM OF HILUS OF LUNG, UNSPECIFIED LATERALITY: ICD-10-CM

## 2017-11-27 DIAGNOSIS — C34.90 MALIGNANT NEOPLASM OF LUNG, UNSPECIFIED LATERALITY, UNSPECIFIED PART OF LUNG: Primary | ICD-10-CM

## 2017-11-27 RX ORDER — ONDANSETRON 8 MG/1
8 TABLET, ORALLY DISINTEGRATING ORAL EVERY 12 HOURS PRN
Qty: 30 TABLET | Refills: 1 | Status: SHIPPED | OUTPATIENT
Start: 2017-11-27 | End: 2018-11-27

## 2017-11-27 NOTE — TELEPHONE ENCOUNTER
Patient presented to pharmacy to  Gilotrif and receive counseling in person. Patient is scheduled to start Gilotrif on 11/27/17.      Patient was counseled on the administration directions for Gilotrif 40mg:  Take 1 tablet by mouth once daily on an empty stomach, take either 1 hour before or 2 hours after meals.    Do not chew, crush, or break the tablets.   If possible, patient was instructed tip the tablets from the RX bottle to the cap, and take directly from the cap to the mouth.  Patient may handle the medication with their hands if they wear with a latex or nitrile glove and wash their hands before and after handling the tablets.    Patient was counseled on the following possible side effects, which include, but are not limited to:  dry skin, acne/pimples, rash, diarrhea, nausea, loss of appetite, vomiting, mouth sores, epistaxis.  Patient was given Eucerin cream for dry skin and Imodium for diarrhea. Patient advised to watch for severe side effects, such as signs of liver, kidney, and eye problems. Informed patient to alert MD if any of these side effects became intolerable as a dose adjustment may be warranted.  Patient states she has a calendar to keep up with lab and MD appointments and was encouraged to remain compliant.      DDIs: none expected with Gilotrif, Medication list reviewed.     Patient was given 2 patient education handouts on how to handle oral chemotherapy and specific recommendations- do's and don'ts. Instructed the patient that if they have any remaining oral chemotherapy, not to flush down the toilet or throw away in the trash; the patient or caregiver should return the unused oral chemotherapy to either the clinic or to myself in the Pharmacy where the oral chemotherapy can be disposed of properly.     Patient confirmed understanding. Patient did not have additional questions and understands he can call [facility] with any questions. Patient is scheduled to start Gilotrif on 11/27/17.   WCB in 2 weeks to see how patient is doing on therapy.  [facility]CB in 3 weeks to confirm readiness for refill.

## 2017-11-28 ENCOUNTER — OFFICE VISIT (OUTPATIENT)
Dept: HEMATOLOGY/ONCOLOGY | Facility: CLINIC | Age: 67
End: 2017-11-28
Payer: MEDICARE

## 2017-11-28 VITALS
TEMPERATURE: 98 F | RESPIRATION RATE: 18 BRPM | HEIGHT: 65 IN | SYSTOLIC BLOOD PRESSURE: 107 MMHG | BODY MASS INDEX: 21.71 KG/M2 | DIASTOLIC BLOOD PRESSURE: 51 MMHG | HEART RATE: 61 BPM | WEIGHT: 130.31 LBS

## 2017-11-28 DIAGNOSIS — C34.00 MALIGNANT NEOPLASM OF HILUS OF LUNG, UNSPECIFIED LATERALITY: Primary | ICD-10-CM

## 2017-11-28 PROCEDURE — 99214 OFFICE O/P EST MOD 30 MIN: CPT | Mod: S$GLB,,, | Performed by: NURSE PRACTITIONER

## 2017-11-28 PROCEDURE — 99999 PR PBB SHADOW E&M-EST. PATIENT-LVL V: CPT | Mod: PBBFAC,,, | Performed by: NURSE PRACTITIONER

## 2017-11-28 NOTE — PROGRESS NOTES
Chemotherapy education provided for Afatinib.  Patient has received medication & review with pharmacist.  Consent for treatment signed.  Treatment plan reviewed in [place].

## 2017-11-29 ENCOUNTER — OFFICE VISIT (OUTPATIENT)
Dept: HEMATOLOGY/ONCOLOGY | Facility: CLINIC | Age: 67
End: 2017-11-29
Payer: MEDICARE

## 2017-11-29 VITALS
BODY MASS INDEX: 20.87 KG/M2 | RESPIRATION RATE: 18 BRPM | WEIGHT: 125.25 LBS | HEART RATE: 61 BPM | HEIGHT: 65 IN | TEMPERATURE: 98 F | SYSTOLIC BLOOD PRESSURE: 102 MMHG | DIASTOLIC BLOOD PRESSURE: 56 MMHG

## 2017-11-29 DIAGNOSIS — C50.011 MALIGNANT NEOPLASM OF NIPPLE OF RIGHT BREAST IN FEMALE, UNSPECIFIED ESTROGEN RECEPTOR STATUS: ICD-10-CM

## 2017-11-29 DIAGNOSIS — C34.00 MALIGNANT NEOPLASM OF HILUS OF LUNG, UNSPECIFIED LATERALITY: Primary | ICD-10-CM

## 2017-11-29 PROCEDURE — 99215 OFFICE O/P EST HI 40 MIN: CPT | Mod: S$GLB,,, | Performed by: INTERNAL MEDICINE

## 2017-11-29 PROCEDURE — 99499 UNLISTED E&M SERVICE: CPT | Mod: S$PBB,,, | Performed by: INTERNAL MEDICINE

## 2017-11-29 PROCEDURE — 99999 PR PBB SHADOW E&M-EST. PATIENT-LVL III: CPT | Mod: PBBFAC,,, | Performed by: INTERNAL MEDICINE

## 2017-11-29 NOTE — PROGRESS NOTES
This is a 66-year-old  woman known to me.  The patient has been   following from a diagnosis of breast cancer in 2007.  She was treated at [facility].  At that time, mitoxantrone and Cytoxan was used because of significant   extensive coronary artery disease and peripheral vascular disease.  The patient   underwent double mastectomy, radiation therapy and adjuvant therapy all at   [facility], under the guidance of Dr. Downing.  She was seen by me for B12   deficiency, hypertension, diabetes, dyslipidemia, coronary artery disease and   depression, and at the last visit, she had complained of feeling more tired and   sleeping a lot, and had not had a recent follow up with Dr. Ha and hence, we   sent her back for cardiac evaluation.  The patient had imaging study done, an   echocardiogram that showed a mass that was wrapping around the heart.  This   raised immediate suspicion.  The patient had been seen by Dr. Hopkins in lieu of   Dr. Mclaughlin, and the patient's family practice doctor, Dr. King, was   contacted.  We asked for an immediate biopsy of this mass.  The films were   discussed with Dr. Martin.  Interventional radiology-guided biopsy was   recommended.  The patient was sent for biopsy with Dr. Connolly at [facility].  Pathology comes back as squamous cell CA.  Pt started carbo / taxol  Rpt PET showing improvement , given a week delay tgo get couints up, continuing chemo here for f/u   she has had a difficult time with loose stools and fatigue pt has stable dz and is starting afatinib , med had arrived   EXAMINATION:  Wt Readings from Last 3 Encounters:   11/28/17 59.1 kg (130 lb 4.7 oz)   11/17/17 57.6 kg (126 lb 15.8 oz)   10/30/17 59.7 kg (131 lb 9.6 oz)     Temp Readings from Last 3 Encounters:   11/28/17 97.9 °F (36.6 °C) (Oral)   11/20/17 98.2 °F (36.8 °C) (Oral)   11/17/17 98.2 °F (36.8 °C)     BP Readings from Last 3 Encounters:   11/28/17 (!) 107/51   11/20/17 (!)  127/59   11/17/17 (!) 108/53     Pulse Readings from Last 3 Encounters:   11/28/17 61   11/20/17 60   11/17/17 83   GENERAL:  NAD  NEURO:  Awake, alert and oriented to time, person and place.  The patient   demonstrates no evidence of depression, anxiety or agitation.  Patient is   cooperative with exam and discussion.  EYES:  Normal conjunctivae and eyelids.  PERRLA 3 to 4 mm without icterus.  ENT AND MOUTH:  External appearance of ears and nose normal without scars,   lesions or masses.  Nasal mucosa, turbinates and septum are normal.  No ENT   drainage and dried blood noted.  No tenderness over frontal or maxillary sinus.    Lips and gums are normal.  Dentition is normal.  NECK:  Supple.  Trachea is central.  No crepitus.  No JVD.  No thyromegaly or   masses.   RESPIRATORY:  No intercostal retractions and no use of accessory muscles of   respirations noted.  No areas of dullness to percussion.  Chest is clear to   auscultation.  No rales, rhonchi or wheezes.  No crepitus.  Good air entry   bilaterally.  CARDIOVASCULAR:  No cardiomegaly.  Normal location.  No thrills or heaviness.    Normal carotid pulse.  No bruits.  S1 and S2 are normally heard without murmurs   or gallops.  All peripheral pulses, including pedal pulses, are present.  ABDOMEN:  Normal abdomen.  No hepatosplenomegaly.  No free fluid.  Bowel sounds   are present.  No hernia noted.  No masses.  No rebound or tenderness.  No   guarding or rigidity.   Umbilicus is midline.  LYMPHATICS:  No axillary, cervical, supraclavicular, submental, or inguinal   lymphadenopathy.  SKIN AND MUSCULOSKELETAL:  There is no evidence of excoriation marks or   ecchymosis.  No rashes.  No pigmented lesions, induration or subcutaneous   nodules.  No sores or abnormal moles.  No cyanosis.  No clubbing.  Nailbed   abnormalities are noted. No joint or skeletal deformities noted.  Normal range   of motion.  BREASTS:  No abnormal masses or discharge.  NEUROLOGIC:  Higher  functions are appropriate.  No cranial nerve deficits.    Normal gait.  Normal strength.  Motor and sensory functions are normal.  Deep   tendon reflexes are normal without Babinski's sign or ankle clonus.  GENITAL AND RECTAL:  Exams are deferred.    LABORATORIES:    Lab Results   Component Value Date    WBC 5.83 11/28/2017    HGB 11.2 (L) 11/28/2017    HCT 35.3 (L) 11/28/2017    MCV 95 11/28/2017     11/28/2017     CMP  Sodium   Date Value Ref Range Status   11/28/2017 143 136 - 145 mmol/L Final     Potassium   Date Value Ref Range Status   11/28/2017 4.4 3.5 - 5.1 mmol/L Final     Chloride   Date Value Ref Range Status   11/28/2017 107 95 - 110 mmol/L Final     CO2   Date Value Ref Range Status   11/28/2017 26 22 - 31 mmol/L Final     Glucose   Date Value Ref Range Status   11/28/2017 98 70 - 110 mg/dL Final     Comment:     The ADA recommends the following guidelines for fasting glucose:  Normal:       less than 100 mg/dL  Prediabetes:  100 mg/dL to 125 mg/dL  Diabetes:     126 mg/dL or higher       BUN, Bld   Date Value Ref Range Status   11/28/2017 13 7 - 18 mg/dL Final     Creatinine   Date Value Ref Range Status   11/28/2017 0.85 0.50 - 1.40 mg/dL Final     Calcium   Date Value Ref Range Status   11/28/2017 7.9 (L) 8.4 - 10.2 mg/dL Final     Total Protein   Date Value Ref Range Status   11/28/2017 7.3 6.0 - 8.4 g/dL Final     Albumin   Date Value Ref Range Status   11/28/2017 3.4 (L) 3.5 - 5.2 g/dL Final     Total Bilirubin   Date Value Ref Range Status   11/28/2017 0.6 0.2 - 1.3 mg/dL Final     Alkaline Phosphatase   Date Value Ref Range Status   11/28/2017 68 38 - 145 U/L Final     AST   Date Value Ref Range Status   11/28/2017 35 14 - 36 U/L Final     ALT   Date Value Ref Range Status   11/28/2017 30 10 - 44 U/L Final     Anion Gap   Date Value Ref Range Status   11/28/2017 10 8 - 16 mmol/L Final     eGFR if    Date Value Ref Range Status   11/28/2017 >60 >60 mL/min/1.73 m^2 Final      eGFR if non    Date Value Ref Range Status   11/28/2017 >60 >60 mL/min/1.73 m^2 Final     Comment:     Calculation used to obtain the estimated glomerular filtration  rate (eGFR) is the CKD-EPI equation.       PET 10/2017 shows improvement in size and hypermetabolic activity  IMPRESSION:  Stage IV squamous cell lung cancer with involvement around the   ventricle, but no other metastatic disease.    PLAN:   will dc carbo/ taxol and proceed with oral afatinib can be started due to completion of platin drug and toxicities related to infusion  rtc 2 weeks with cbc, cmp cont CAD montioring also of HTN, lipids and Dm       cont cardiologu f/u

## 2017-12-05 ENCOUNTER — TELEPHONE (OUTPATIENT)
Dept: PHARMACY | Facility: CLINIC | Age: 67
End: 2017-12-05

## 2017-12-05 NOTE — TELEPHONE ENCOUNTER
Patient reports Gilotrif not started until 12/5. Patient received Imodium and aware to start at first sign of diarrhea.  [facility] will follow up in 1 week.

## 2017-12-12 ENCOUNTER — TELEPHONE (OUTPATIENT)
Dept: PHARMACY | Facility: CLINIC | Age: 67
End: 2017-12-12

## 2017-12-13 ENCOUNTER — TELEPHONE (OUTPATIENT)
Dept: HEMATOLOGY/ONCOLOGY | Facility: CLINIC | Age: 67
End: 2017-12-13

## 2017-12-13 DIAGNOSIS — C34.00 MALIGNANT NEOPLASM OF HILUS OF LUNG, UNSPECIFIED LATERALITY: ICD-10-CM

## 2017-12-14 ENCOUNTER — TELEPHONE (OUTPATIENT)
Dept: HEMATOLOGY/ONCOLOGY | Facility: CLINIC | Age: 67
End: 2017-12-14

## 2017-12-14 NOTE — TELEPHONE ENCOUNTER
----- Message from Rukhsana Westfall sent at 12/14/2017  1:16 PM CST -----  Contact: Coni from [facility]  Calling to request refill on mouthwash to be sent to [facility]. Thanks!

## 2017-12-14 NOTE — TELEPHONE ENCOUNTER
----- Message from Leila Rothman sent at 12/13/2017  4:39 PM CST -----  Contact: daughter-Evelin  Pt daughter Sharon calling to see why or when the magic mouthwash for the pt sores called in,checking the status cause the specialty pharmacy states they haven't got a request to fill....[phone]

## 2017-12-14 NOTE — TELEPHONE ENCOUNTER
----- Message from Vianey Varghese sent at 12/14/2017  1:23 PM CST -----  Contact: patient  Patient calling in regards to the prescription. She wants to speak with the Nurse to find out which location is the prescription at. Please advise.  Call back   Thanks!

## 2017-12-14 NOTE — TELEPHONE ENCOUNTER
I called and notified Ms Waters that the magic mouthwash was sent to [facility] at [address] [facility] [address]. She will go and get it. She said her mom was very anxious and did not start her med trmt for a week. Dr Brothers told her if she was doing ok not to come and wait till the 28th of this month. She does have some mouth sores. Told her to let us know how the mouthwash does for her. She said she cx the appt when she called for the mouthwash. I told her I would do it now. cm

## 2017-12-14 NOTE — TELEPHONE ENCOUNTER
Patient notified to stop taking afatinib (Chemo Pill) until next MD appointment due to excessive diarrhea. Will f/u with patient on Monday to see if diarrhea has subsided.

## 2017-12-15 DIAGNOSIS — Z11.59 NEED FOR HEPATITIS C SCREENING TEST: ICD-10-CM

## 2017-12-18 ENCOUNTER — TELEPHONE (OUTPATIENT)
Dept: HEMATOLOGY/ONCOLOGY | Facility: CLINIC | Age: 67
End: 2017-12-18

## 2017-12-18 NOTE — TELEPHONE ENCOUNTER
----- Message from Thuy Moreno sent at 12/18/2017  2:22 PM CST -----  Call han / Evelin [phone] ... Asking to get an appointment scheduled for this week please

## 2017-12-22 ENCOUNTER — OFFICE VISIT (OUTPATIENT)
Dept: HEMATOLOGY/ONCOLOGY | Facility: CLINIC | Age: 67
End: 2017-12-22
Payer: MEDICARE

## 2017-12-22 ENCOUNTER — TELEPHONE (OUTPATIENT)
Dept: INFUSION THERAPY | Facility: HOSPITAL | Age: 67
End: 2017-12-22

## 2017-12-22 ENCOUNTER — LAB VISIT (OUTPATIENT)
Dept: LAB | Facility: HOSPITAL | Age: 67
End: 2017-12-22
Attending: INTERNAL MEDICINE
Payer: MEDICARE

## 2017-12-22 VITALS
DIASTOLIC BLOOD PRESSURE: 51 MMHG | SYSTOLIC BLOOD PRESSURE: 99 MMHG | HEIGHT: 65 IN | BODY MASS INDEX: 20.75 KG/M2 | HEART RATE: 62 BPM | WEIGHT: 124.56 LBS | TEMPERATURE: 98 F | RESPIRATION RATE: 16 BRPM

## 2017-12-22 DIAGNOSIS — C34.00 MALIGNANT NEOPLASM OF HILUS OF LUNG, UNSPECIFIED LATERALITY: Primary | ICD-10-CM

## 2017-12-22 DIAGNOSIS — R52 PAIN: ICD-10-CM

## 2017-12-22 DIAGNOSIS — I25.10 CORONARY ARTERY DISEASE INVOLVING NATIVE CORONARY ARTERY OF NATIVE HEART WITHOUT ANGINA PECTORIS: ICD-10-CM

## 2017-12-22 DIAGNOSIS — E53.8 B12 DEFICIENCY: ICD-10-CM

## 2017-12-22 DIAGNOSIS — C50.011 MALIGNANT NEOPLASM OF NIPPLE OF RIGHT BREAST IN FEMALE, UNSPECIFIED ESTROGEN RECEPTOR STATUS: ICD-10-CM

## 2017-12-22 DIAGNOSIS — C34.90 MALIGNANT NEOPLASM OF LUNG, UNSPECIFIED LATERALITY, UNSPECIFIED PART OF LUNG: ICD-10-CM

## 2017-12-22 LAB
ALBUMIN SERPL BCP-MCNC: 3.7 G/DL
ALP SERPL-CCNC: 69 U/L
ALT SERPL W/O P-5'-P-CCNC: 35 U/L
ANION GAP SERPL CALC-SCNC: 12 MMOL/L
AST SERPL-CCNC: 39 U/L
BASOPHILS # BLD AUTO: 0.03 K/UL
BASOPHILS NFR BLD: 0.5 %
BILIRUB SERPL-MCNC: 0.6 MG/DL
BUN SERPL-MCNC: 10 MG/DL
CALCIUM SERPL-MCNC: 8.8 MG/DL
CHLORIDE SERPL-SCNC: 103 MMOL/L
CO2 SERPL-SCNC: 29 MMOL/L
CREAT SERPL-MCNC: 0.97 MG/DL
DIFFERENTIAL METHOD: ABNORMAL
EOSINOPHIL # BLD AUTO: 0.2 K/UL
EOSINOPHIL NFR BLD: 4.3 %
ERYTHROCYTE [DISTWIDTH] IN BLOOD BY AUTOMATED COUNT: 17.4 %
EST. GFR  (AFRICAN AMERICAN): >60 ML/MIN/1.73 M^2
EST. GFR  (NON AFRICAN AMERICAN): >60 ML/MIN/1.73 M^2
GLUCOSE SERPL-MCNC: 132 MG/DL
HCT VFR BLD AUTO: 32.7 %
HGB BLD-MCNC: 10.7 G/DL
LYMPHOCYTES # BLD AUTO: 2.1 K/UL
LYMPHOCYTES NFR BLD: 38.4 %
MCH RBC QN AUTO: 31.4 PG
MCHC RBC AUTO-ENTMCNC: 32.7 G/DL
MCV RBC AUTO: 96 FL
MONOCYTES # BLD AUTO: 0.5 K/UL
MONOCYTES NFR BLD: 9.8 %
NEUTROPHILS # BLD AUTO: 2.6 K/UL
NEUTROPHILS NFR BLD: 47 %
NRBC BLD-RTO: 0 /100 WBC
PLATELET # BLD AUTO: 220 K/UL
PMV BLD AUTO: 10 FL
POTASSIUM SERPL-SCNC: 4.1 MMOL/L
PROT SERPL-MCNC: 7.6 G/DL
RBC # BLD AUTO: 3.41 M/UL
SODIUM SERPL-SCNC: 144 MMOL/L
WBC # BLD AUTO: 5.52 K/UL

## 2017-12-22 PROCEDURE — 80053 COMPREHEN METABOLIC PANEL: CPT

## 2017-12-22 PROCEDURE — 85025 COMPLETE CBC W/AUTO DIFF WBC: CPT | Mod: PN

## 2017-12-22 PROCEDURE — 99499 UNLISTED E&M SERVICE: CPT | Mod: S$PBB,,, | Performed by: INTERNAL MEDICINE

## 2017-12-22 PROCEDURE — 80053 COMPREHEN METABOLIC PANEL: CPT | Mod: PN

## 2017-12-22 PROCEDURE — 85025 COMPLETE CBC W/AUTO DIFF WBC: CPT

## 2017-12-22 PROCEDURE — 99214 OFFICE O/P EST MOD 30 MIN: CPT | Mod: S$GLB,,, | Performed by: INTERNAL MEDICINE

## 2017-12-22 PROCEDURE — 99999 PR PBB SHADOW E&M-EST. PATIENT-LVL III: CPT | Mod: PBBFAC,,, | Performed by: INTERNAL MEDICINE

## 2017-12-22 PROCEDURE — 36415 COLL VENOUS BLD VENIPUNCTURE: CPT | Mod: PN

## 2017-12-22 RX ORDER — DRONABINOL 2.5 MG/1
2.5 CAPSULE ORAL 2 TIMES DAILY
Status: DISCONTINUED | OUTPATIENT
Start: 2017-12-22 | End: 2018-01-02 | Stop reason: CLARIF

## 2017-12-22 RX ORDER — HYDROCODONE BITARTRATE AND ACETAMINOPHEN 5; 325 MG/1; MG/1
2 TABLET ORAL EVERY 4 HOURS PRN
Qty: 180 TABLET | Refills: 0 | Status: SHIPPED | OUTPATIENT
Start: 2017-12-22 | End: 2018-01-19 | Stop reason: SDUPTHER

## 2017-12-22 RX ORDER — DIPHENOXYLATE HYDROCHLORIDE AND ATROPINE SULFATE 2.5; .025 MG/1; MG/1
1 TABLET ORAL 4 TIMES DAILY PRN
Qty: 30 TABLET | Refills: 1 | Status: ON HOLD | OUTPATIENT
Start: 2017-12-22 | End: 2018-01-08

## 2017-12-22 RX ORDER — ALENDRONATE SODIUM 70 MG/1
TABLET ORAL
Qty: 4 TABLET | Refills: 11 | Status: ON HOLD | OUTPATIENT
Start: 2017-12-22 | End: 2018-01-08

## 2017-12-22 NOTE — PROGRESS NOTES
This is a 66-year-old  woman known to me.  The patient has been   following from a diagnosis of breast cancer in 2007.  She was treated at [facility].  At that time, mitoxantrone and Cytoxan was used because of significant   extensive coronary artery disease and peripheral vascular disease.  The patient   underwent double mastectomy, radiation therapy and adjuvant therapy all at   [facility], under the guidance of Dr. Downing.  She was seen by me for B12   deficiency, hypertension, diabetes, dyslipidemia, coronary artery disease and   depression, and at the last visit, she had complained of feeling more tired and   sleeping a lot, and had not had a recent follow up with Dr. Ha and hence, we   sent her back for cardiac evaluation.  The patient had imaging study done, an   echocardiogram that showed a mass that was wrapping around the heart.  This   raised immediate suspicion.  The patient had been seen by Dr. Hopkins in lieu of   Dr. Mclaughlin, and the patient's family practice doctor, Dr. King, was   contacted.  We asked for an immediate biopsy of this mass.  The films were   discussed with Dr. Martin.  Interventional radiology-guided biopsy was   recommended.  The patient was sent for biopsy with Dr. Connolly at [facility].  Pathology comes back as squamous cell CA.  Pt started carbo / taxol  Rpt PET showing improvement , given a week delay tgo get couints up, continuing chemo here for f/u   she has had a difficult time with loose stools and fatigue pt has stable dz and is starting afatinib , she had significant diarrhea on afatinib , incontinece,   She is tired , here with daughter, on imodium   EXAMINATION:  Wt Readings from Last 3 Encounters:   11/29/17 56.8 kg (125 lb 3.5 oz)   11/28/17 59.1 kg (130 lb 4.7 oz)   11/17/17 57.6 kg (126 lb 15.8 oz)     Temp Readings from Last 3 Encounters:   11/29/17 98.4 °F (36.9 °C)   11/28/17 97.9 °F (36.6 °C) (Oral)   11/20/17 98.2 °F (36.8 °C)  (Oral)     BP Readings from Last 3 Encounters:   11/29/17 (!) 102/56   11/28/17 (!) 107/51   11/20/17 (!) 127/59     Pulse Readings from Last 3 Encounters:   11/29/17 61   11/28/17 61   11/20/17 60   GENERAL:  NAD  NEURO:  Awake, alert and oriented to time, person and place.  The patient   demonstrates no evidence of depression, anxiety or agitation.  Patient is   cooperative with exam and discussion.  EYES:  Normal conjunctivae and eyelids.  PERRLA 3 to 4 mm without icterus.  ENT AND MOUTH:  External appearance of ears and nose normal without scars,   lesions or masses.  Nasal mucosa, turbinates and septum are normal.  No ENT   drainage and dried blood noted.  No tenderness over frontal or maxillary sinus.    Lips and gums are normal.  Dentition is normal.  NECK:  Supple.  Trachea is central.  No crepitus.  No JVD.  No thyromegaly or   masses.   RESPIRATORY:  No intercostal retractions and no use of accessory muscles of   respirations noted.  No areas of dullness to percussion.  Chest is clear to   auscultation.  No rales, rhonchi or wheezes.  No crepitus.  Good air entry   bilaterally.  CARDIOVASCULAR:  No cardiomegaly.  Normal location.  No thrills or heaviness.    Normal carotid pulse.  No bruits.  S1 and S2 are normally heard without murmurs   or gallops.  All peripheral pulses, including pedal pulses, are present.  ABDOMEN:  Normal abdomen.  No hepatosplenomegaly.  No free fluid.  Bowel sounds   are present.  No hernia noted.  No masses.  No rebound or tenderness.  No   guarding or rigidity.   Umbilicus is midline.  LYMPHATICS:  No axillary, cervical, supraclavicular, submental, or inguinal   lymphadenopathy.  SKIN AND MUSCULOSKELETAL:  There is no evidence of excoriation marks or   ecchymosis.  No rashes.  No pigmented lesions, induration or subcutaneous   nodules.  No sores or abnormal moles.  No cyanosis.  No clubbing.  Nailbed   abnormalities are noted. No joint or skeletal deformities noted.  Normal range    of motion.  BREASTS:  No abnormal masses or discharge.  NEUROLOGIC:  Higher functions are appropriate.  No cranial nerve deficits.    Normal gait.  Normal strength.  Motor and sensory functions are normal.  Deep   tendon reflexes are normal without Babinski's sign or ankle clonus.  GENITAL AND RECTAL:  Exams are deferred.    LABORATORIES:    Lab Results   Component Value Date    WBC 5.52 12/22/2017    HGB 10.7 (L) 12/22/2017    HCT 32.7 (L) 12/22/2017    MCV 96 12/22/2017     12/22/2017     CMP  Sodium   Date Value Ref Range Status   11/28/2017 143 136 - 145 mmol/L Final     Potassium   Date Value Ref Range Status   11/28/2017 4.4 3.5 - 5.1 mmol/L Final     Chloride   Date Value Ref Range Status   11/28/2017 107 95 - 110 mmol/L Final     CO2   Date Value Ref Range Status   11/28/2017 26 22 - 31 mmol/L Final     Glucose   Date Value Ref Range Status   11/28/2017 98 70 - 110 mg/dL Final     Comment:     The ADA recommends the following guidelines for fasting glucose:  Normal:       less than 100 mg/dL  Prediabetes:  100 mg/dL to 125 mg/dL  Diabetes:     126 mg/dL or higher       BUN, Bld   Date Value Ref Range Status   11/28/2017 13 7 - 18 mg/dL Final     Creatinine   Date Value Ref Range Status   11/28/2017 0.85 0.50 - 1.40 mg/dL Final     Calcium   Date Value Ref Range Status   11/28/2017 7.9 (L) 8.4 - 10.2 mg/dL Final     Total Protein   Date Value Ref Range Status   11/28/2017 7.3 6.0 - 8.4 g/dL Final     Albumin   Date Value Ref Range Status   11/28/2017 3.4 (L) 3.5 - 5.2 g/dL Final     Total Bilirubin   Date Value Ref Range Status   11/28/2017 0.6 0.2 - 1.3 mg/dL Final     Alkaline Phosphatase   Date Value Ref Range Status   11/28/2017 68 38 - 145 U/L Final     AST   Date Value Ref Range Status   11/28/2017 35 14 - 36 U/L Final     ALT   Date Value Ref Range Status   11/28/2017 30 10 - 44 U/L Final     Anion Gap   Date Value Ref Range Status   11/28/2017 10 8 - 16 mmol/L Final     eGFR if African  American   Date Value Ref Range Status   11/28/2017 >60 >60 mL/min/1.73 m^2 Final     eGFR if non    Date Value Ref Range Status   11/28/2017 >60 >60 mL/min/1.73 m^2 Final     Comment:     Calculation used to obtain the estimated glomerular filtration  rate (eGFR) is the CKD-EPI equation.       PET 10/2017 shows improvement in size and hypermetabolic activity  IMPRESSION:  Stage IV squamous cell lung cancer with involvement around the   ventricle, but no other metastatic disease.    PLAN:   will resume afating with a 3 -4 day delay so she can enjoy xmas, lomotil rx given + imodium , if above doesn't help will decrease dose by 10 mg .   rtc 4 weeks with cbc, cmp   cont cards f/u

## 2017-12-26 ENCOUNTER — TELEPHONE (OUTPATIENT)
Dept: HEMATOLOGY/ONCOLOGY | Facility: CLINIC | Age: 67
End: 2017-12-26

## 2017-12-28 ENCOUNTER — TELEPHONE (OUTPATIENT)
Dept: HEMATOLOGY/ONCOLOGY | Facility: CLINIC | Age: 67
End: 2017-12-28

## 2017-12-28 NOTE — TELEPHONE ENCOUNTER
I rtc and left v/m I got her message, no mention of elevated temp, but with green sputum she needs to be evaluated by [facility], urgent care or ER. Please call me back and let me know she got this message. cm

## 2017-12-28 NOTE — TELEPHONE ENCOUNTER
----- Message from Thuy Moreno sent at 12/28/2017 12:44 PM CST -----  Productive cough/ green sputum / home health nurse / Lottie [phone]

## 2018-01-02 ENCOUNTER — TELEPHONE (OUTPATIENT)
Dept: HEMATOLOGY/ONCOLOGY | Facility: CLINIC | Age: 68
End: 2018-01-02

## 2018-01-02 PROBLEM — S72.001A CLOSED FRACTURE OF NECK OF RIGHT FEMUR: Status: ACTIVE | Noted: 2018-01-02

## 2018-01-02 PROBLEM — Z71.89 ADVANCE CARE PLANNING: Status: ACTIVE | Noted: 2018-01-02

## 2018-01-02 NOTE — TELEPHONE ENCOUNTER
Returned call to daughter, who stated patient is currently at [facility] ER being admitted. Patient had fall and has broken right hip. ER md called to speak with Dr. Brothers, Dr. Brothers's cell number provided to ER md Tahira Smith LPN as Dr. Brothers is covering [facility]

## 2018-01-02 NOTE — TELEPHONE ENCOUNTER
----- Message from Milena Cornell sent at 1/2/2018  8:59 AM CST -----  Daughter/Evelin would like to get patient in today because she was brought to the [facility] for a cold. Please call back with availability at [phone].

## 2018-01-04 PROBLEM — R19.7 DIARRHEA: Status: ACTIVE | Noted: 2018-01-04

## 2018-01-05 ENCOUNTER — TELEPHONE (OUTPATIENT)
Dept: FAMILY MEDICINE | Facility: CLINIC | Age: 68
End: 2018-01-05

## 2018-01-05 NOTE — TELEPHONE ENCOUNTER
----- Message from Gareth Wood sent at 1/5/2018 11:39 AM CST -----  Contact: Evelin Matthewson patient's daughter called with questions didn't disclose reason for the call.please call back at [phone]. Thanks,

## 2018-01-10 ENCOUNTER — TELEPHONE (OUTPATIENT)
Dept: ORTHOPEDICS | Facility: CLINIC | Age: 68
End: 2018-01-10

## 2018-01-10 NOTE — TELEPHONE ENCOUNTER
----- Message from Radha Rodriguez sent at 1/10/2018  3:30 PM CST -----  Contact: Vahe / /[employer] / [phone]  Vahe left a voice mail message today at 3:00 that he needs to speak with the nurse regarding patient's hip precautions.  Please call.

## 2018-01-11 ENCOUNTER — TELEPHONE (OUTPATIENT)
Dept: HEMATOLOGY/ONCOLOGY | Facility: CLINIC | Age: 68
End: 2018-01-11

## 2018-01-11 NOTE — TELEPHONE ENCOUNTER
Called patient with hospital follow up and lab on 1/19/18, patient voiced understanding and appreciation.

## 2018-01-12 DIAGNOSIS — C34.00 MALIGNANT NEOPLASM OF HILUS OF LUNG, UNSPECIFIED LATERALITY: ICD-10-CM

## 2018-01-16 ENCOUNTER — TELEPHONE (OUTPATIENT)
Dept: HEMATOLOGY/ONCOLOGY | Facility: CLINIC | Age: 68
End: 2018-01-16

## 2018-01-16 RX ORDER — DIPHENOXYLATE HYDROCHLORIDE AND ATROPINE SULFATE 2.5; .025 MG/1; MG/1
1 TABLET ORAL 3 TIMES DAILY PRN
Start: 2018-01-16 | End: 2019-01-16
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
## 2018-01-18 ENCOUNTER — TELEPHONE (OUTPATIENT)
Dept: ORTHOPEDICS | Facility: CLINIC | Age: 68
End: 2018-01-18

## 2018-01-18 NOTE — TELEPHONE ENCOUNTER
----- Message from Radha Rodriguez sent at 1/18/2018 11:37 AM CST -----  Contact: Lottie // [employer] / [phone]  Lottie left a voice mail message today at 11:30 wanting to know if it's ok to take out patient's staples.  Please advise.

## 2018-01-18 NOTE — TELEPHONE ENCOUNTER
Yann Tafoya to return call as Dr. Nash is out on medical leave and I can not give an order to remove the uli without Dr. Nash being here. Will call patient to schedule follow up appt with Dr. Lunsford.

## 2018-01-19 ENCOUNTER — OFFICE VISIT (OUTPATIENT)
Dept: HEMATOLOGY/ONCOLOGY | Facility: CLINIC | Age: 68
End: 2018-01-19
Payer: MEDICARE

## 2018-01-19 ENCOUNTER — LAB VISIT (OUTPATIENT)
Dept: LAB | Facility: HOSPITAL | Age: 68
End: 2018-01-19
Attending: INTERNAL MEDICINE
Payer: MEDICARE

## 2018-01-19 VITALS
HEIGHT: 65 IN | BODY MASS INDEX: 19.16 KG/M2 | TEMPERATURE: 98 F | WEIGHT: 115 LBS | HEART RATE: 79 BPM | RESPIRATION RATE: 16 BRPM | DIASTOLIC BLOOD PRESSURE: 52 MMHG | SYSTOLIC BLOOD PRESSURE: 101 MMHG

## 2018-01-19 DIAGNOSIS — C34.00 MALIGNANT NEOPLASM OF HILUS OF LUNG, UNSPECIFIED LATERALITY: ICD-10-CM

## 2018-01-19 DIAGNOSIS — I10 ESSENTIAL HYPERTENSION: ICD-10-CM

## 2018-01-19 DIAGNOSIS — C50.011 MALIGNANT NEOPLASM OF NIPPLE OF RIGHT BREAST IN FEMALE, UNSPECIFIED ESTROGEN RECEPTOR STATUS: Primary | ICD-10-CM

## 2018-01-19 DIAGNOSIS — C80.1 CANCER: ICD-10-CM

## 2018-01-19 DIAGNOSIS — R52 PAIN: ICD-10-CM

## 2018-01-19 DIAGNOSIS — M25.551 RIGHT HIP PAIN: Primary | ICD-10-CM

## 2018-01-19 LAB
ALBUMIN SERPL BCP-MCNC: 3.4 G/DL
ALP SERPL-CCNC: 95 U/L
ALT SERPL W/O P-5'-P-CCNC: 30 U/L
ANION GAP SERPL CALC-SCNC: 10 MMOL/L
AST SERPL-CCNC: 35 U/L
BASOPHILS # BLD AUTO: 0.01 K/UL
BASOPHILS NFR BLD: 0.2 %
BILIRUB SERPL-MCNC: 0.9 MG/DL
BUN SERPL-MCNC: 15 MG/DL
CALCIUM SERPL-MCNC: 8.3 MG/DL
CHLORIDE SERPL-SCNC: 106 MMOL/L
CO2 SERPL-SCNC: 27 MMOL/L
CREAT SERPL-MCNC: 0.8 MG/DL
DIFFERENTIAL METHOD: ABNORMAL
EOSINOPHIL # BLD AUTO: 0.1 K/UL
EOSINOPHIL NFR BLD: 2.5 %
ERYTHROCYTE [DISTWIDTH] IN BLOOD BY AUTOMATED COUNT: 17.5 %
EST. GFR  (AFRICAN AMERICAN): >60 ML/MIN/1.73 M^2
EST. GFR  (NON AFRICAN AMERICAN): >60 ML/MIN/1.73 M^2
GLUCOSE SERPL-MCNC: 127 MG/DL
HCT VFR BLD AUTO: 33.7 %
HGB BLD-MCNC: 10.6 G/DL
LYMPHOCYTES # BLD AUTO: 1.6 K/UL
LYMPHOCYTES NFR BLD: 33.5 %
MCH RBC QN AUTO: 29.8 PG
MCHC RBC AUTO-ENTMCNC: 31.5 G/DL
MCV RBC AUTO: 95 FL
MONOCYTES # BLD AUTO: 0.4 K/UL
MONOCYTES NFR BLD: 8.6 %
NEUTROPHILS # BLD AUTO: 2.6 K/UL
NEUTROPHILS NFR BLD: 55.2 %
NRBC BLD-RTO: 0 /100 WBC
PLATELET # BLD AUTO: 245 K/UL
PMV BLD AUTO: 10 FL
POTASSIUM SERPL-SCNC: 3.4 MMOL/L
PROT SERPL-MCNC: 7.2 G/DL
RBC # BLD AUTO: 3.56 M/UL
SODIUM SERPL-SCNC: 143 MMOL/L
WBC # BLD AUTO: 4.74 K/UL

## 2018-01-19 PROCEDURE — 99499 UNLISTED E&M SERVICE: CPT | Mod: S$GLB,,, | Performed by: INTERNAL MEDICINE

## 2018-01-19 PROCEDURE — 99215 OFFICE O/P EST HI 40 MIN: CPT | Mod: S$GLB,,, | Performed by: INTERNAL MEDICINE

## 2018-01-19 PROCEDURE — 85025 COMPLETE CBC W/AUTO DIFF WBC: CPT | Mod: PN

## 2018-01-19 PROCEDURE — 80053 COMPREHEN METABOLIC PANEL: CPT | Mod: PN

## 2018-01-19 PROCEDURE — 36415 COLL VENOUS BLD VENIPUNCTURE: CPT | Mod: PN

## 2018-01-19 PROCEDURE — 85025 COMPLETE CBC W/AUTO DIFF WBC: CPT

## 2018-01-19 PROCEDURE — 99999 PR PBB SHADOW E&M-EST. PATIENT-LVL III: CPT | Mod: PBBFAC,,, | Performed by: INTERNAL MEDICINE

## 2018-01-19 PROCEDURE — 80053 COMPREHEN METABOLIC PANEL: CPT

## 2018-01-19 RX ORDER — HEPARIN 100 UNIT/ML
500 SYRINGE INTRAVENOUS
Status: CANCELLED | OUTPATIENT
Start: 2018-01-26

## 2018-01-19 RX ORDER — HYDROCODONE BITARTRATE AND ACETAMINOPHEN 5; 325 MG/1; MG/1
2 TABLET ORAL EVERY 4 HOURS PRN
Qty: 180 TABLET | Refills: 0 | Status: SHIPPED | OUTPATIENT
Start: 2018-01-19 | End: 2018-02-15 | Stop reason: SDUPTHER

## 2018-01-19 RX ORDER — SODIUM CHLORIDE 0.9 % (FLUSH) 0.9 %
10 SYRINGE (ML) INJECTION
Status: CANCELLED | OUTPATIENT
Start: 2018-01-26

## 2018-01-19 RX ORDER — FENTANYL 25 UG/1
1 PATCH TRANSDERMAL
Qty: 10 PATCH | Refills: 0 | Status: SHIPPED | OUTPATIENT
Start: 2018-01-19 | End: 2018-02-15 | Stop reason: ALTCHOICE
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
## 2018-01-22 ENCOUNTER — TELEPHONE (OUTPATIENT)
Dept: HEMATOLOGY/ONCOLOGY | Facility: CLINIC | Age: 68
End: 2018-01-22

## 2018-01-22 DIAGNOSIS — F31.9 BIPOLAR AFFECTIVE DISORDER: ICD-10-CM

## 2018-01-22 DIAGNOSIS — C34.90 MALIGNANT NEOPLASM OF LUNG, UNSPECIFIED LATERALITY, UNSPECIFIED PART OF LUNG: Primary | ICD-10-CM

## 2018-01-22 NOTE — TELEPHONE ENCOUNTER
PET scan scheduled for Thursday at 9:45 and infusion scheduled for 12:30 pending auth.  Patient is aware.

## 2018-01-22 NOTE — TELEPHONE ENCOUNTER
----- Message from Leial Rothman sent at 1/22/2018 11:50 AM CST -----  Contact: sherry-daughter  Pt daughter Sharon calling wanting to speak to Nurse Herrera regarding thought pt was supposed to have a PET Scan prior to her starting chemo again....[phone]

## 2018-01-22 NOTE — TELEPHONE ENCOUNTER
----- Message from Jacqui Castillo sent at 1/22/2018  4:05 PM CST -----    Calling to  Speak to  The  Nurse  About  Making an  Meka   With  The  Dr // please call [phone] //

## 2018-01-23 ENCOUNTER — TELEPHONE (OUTPATIENT)
Dept: PHARMACY | Facility: AMBULARY SURGERY CENTER | Age: 68
End: 2018-01-23

## 2018-01-23 RX ORDER — LORAZEPAM 1 MG/1
TABLET ORAL
Qty: 90 TABLET | Refills: 2 | Status: SHIPPED | OUTPATIENT
Start: 2018-01-23

## 2018-01-23 RX ORDER — METOPROLOL SUCCINATE 25 MG/1
TABLET, EXTENDED RELEASE ORAL
Qty: 30 TABLET | Refills: 11 | Status: SHIPPED | OUTPATIENT
Start: 2018-01-23

## 2018-01-23 NOTE — TELEPHONE ENCOUNTER
----- Message from Vianey Varghese sent at 1/23/2018  1:06 PM CST -----  Contact: patient  Patient calling to schedule an appt for 1/26/18. She said it was supposed to have been scheduled when her PET Scan and the Infusion was scheduled. Please advise.  Call back   Thanks!

## 2018-01-24 ENCOUNTER — HOSPITAL ENCOUNTER (OUTPATIENT)
Dept: RADIOLOGY | Facility: HOSPITAL | Age: 68
Discharge: HOME | End: 2018-01-24
Attending: ORTHOPAEDIC SURGERY
Payer: MEDICARE

## 2018-01-24 ENCOUNTER — OFFICE VISIT (OUTPATIENT)
Dept: ORTHOPEDICS | Facility: CLINIC | Age: 68
End: 2018-01-24
Payer: MEDICARE

## 2018-01-24 ENCOUNTER — TELEPHONE (OUTPATIENT)
Dept: HEMATOLOGY/ONCOLOGY | Facility: CLINIC | Age: 68
End: 2018-01-24

## 2018-01-24 VITALS
DIASTOLIC BLOOD PRESSURE: 66 MMHG | SYSTOLIC BLOOD PRESSURE: 98 MMHG | WEIGHT: 115 LBS | HEART RATE: 78 BPM | HEIGHT: 65 IN | BODY MASS INDEX: 19.16 KG/M2

## 2018-01-24 DIAGNOSIS — M25.551 RIGHT HIP PAIN: ICD-10-CM

## 2018-01-24 DIAGNOSIS — S72.001D CLOSED FRACTURE OF NECK OF RIGHT FEMUR WITH ROUTINE HEALING, SUBSEQUENT ENCOUNTER: Primary | ICD-10-CM

## 2018-01-24 PROCEDURE — 73502 X-RAY EXAM HIP UNI 2-3 VIEWS: CPT | Mod: 26,RT,, | Performed by: RADIOLOGY

## 2018-01-24 PROCEDURE — 99024 POSTOP FOLLOW-UP VISIT: CPT | Mod: S$GLB,,, | Performed by: ORTHOPAEDIC SURGERY

## 2018-01-24 PROCEDURE — 73502 X-RAY EXAM HIP UNI 2-3 VIEWS: CPT | Mod: TC,PO,RT

## 2018-01-24 PROCEDURE — 99999 PR PBB SHADOW E&M-EST. PATIENT-LVL III: CPT | Mod: PBBFAC,,, | Performed by: ORTHOPAEDIC SURGERY

## 2018-01-24 NOTE — TELEPHONE ENCOUNTER
----- Message from Jonathan Crain sent at 1/24/2018  1:27 PM CST -----  Contact: Daughter  Evelin Saunders, [phone] or patient [phone], seems to be confusion about the appointments starting tomorrow. Would like to clarify. Stated that Dr. Brothers said she should have an appointment with the doctor on Friday. Please advise. Thanks.

## 2018-01-24 NOTE — PROGRESS NOTES
Past Medical History:   Diagnosis Date    Anticoagulant long-term use     Plavix & Aspirin-stopped 06/18/13    Anxiety     Aortic aneurysm     Asthma     Bipolar disorder     Blood transfusion     CAD (coronary artery disease)     Cancer     breast CA    COPD (chronic obstructive pulmonary disease)     Diabetes     History of breast cancer     History of home oxygen therapy     2L    Hyperlipidemia     Lumbago     Lung cancer     MRSA infection     VAUGHN (obstructive sleep apnea) 2009    uses C-PAP    PAD (peripheral artery disease)     Poor intravenous access     Chemotherapy. Pt did not have a Port, use left arm    Port-a-cath in place     Rt       Past Surgical History:   Procedure Laterality Date    APPENDECTOMY      CARDIAC CATHETERIZATION      CORONARY ARTERY BYPASS GRAFT  felton, 1994    x 3    FEMORAL ARTERY STENT      bilateral    HYSTERECTOMY      MASTECTOMY      bilateral; 2010; No BP on Rt arm    VAGINAL DELIVERY      times 2       Current Outpatient Prescriptions   Medication Sig    acetaminophen (TYLENOL) 325 MG tablet Take 2 tablets (650 mg total) by mouth every 6 (six) hours as needed for Pain.    albuterol 90 mcg/actuation inhaler Inhale 1-2 puffs into the lungs every 6 (six) hours as needed for Wheezing or Shortness of Breath (cough). Rescue    alendronate (FOSAMAX) 70 MG tablet TAKE ONE TABLET BY MOUTH ONCE DAILY EVERY 7 days    amLODIPine (NORVASC) 10 MG tablet TAKE ONE TABLET BY MOUTH ONCE nightly    aspirin (ECOTRIN) 81 MG EC tablet Take 81 mg by mouth every evening.     atorvastatin (LIPITOR) 40 MG tablet TAKE ONE TABLET BY MOUTH ONCE nightly    blood sugar diagnostic Strp 1 strip by Misc.(Non-Drug; Combo Route) route 2 (two) times daily.    blood-glucose meter kit Use as instructed    citalopram (CELEXA) 20 MG tablet TAKE ONE TABLET BY MOUTH EVERY DAY Q HS    clopidogrel (PLAVIX) 75 mg tablet TAKE ONE TABLET BY MOUTH ONCE DAILY at HS     diphenoxylate-atropine 2.5-0.025 mg (LOMOTIL) 2.5-0.025 mg per tablet Take 1 tablet by mouth 3 (three) times daily as needed for Diarrhea.    fenofibrate 160 MG Tab TAKE ONE TABLET BY MOUTH ONCE DAILY at HS    fentaNYL (DURAGESIC) 25 mcg/hr Place 1 patch onto the skin every 72 hours. pain    gabapentin (NEURONTIN) 300 MG capsule Take 1 capsule (300 mg total) by mouth 2 (two) times daily.    hydrocodone-acetaminophen 5-325mg (NORCO) 5-325 mg per tablet Take 2 tablets by mouth every 4 (four) hours as needed for Pain.    hydrocortisone 2.5 % cream Apply topically 2 (two) times daily PRN    iron polysaccharide complex, vit c-sa (FERREX 150 PLUS) 150-50-50 mg Cap capsule Take 1 capsule by mouth once daily.    ketoconazole (NIZORAL) 2 % cream APPLY TO THE AFFECTED AREA twice daily use with desonide PRN    L.acidophil,parac-S.therm-Bif. (RISAQUAD) Cap capsule Take 1 capsule by mouth once daily.    lancets Misc 1 Units by Misc.(Non-Drug; Combo Route) route 3 (three) times daily.    lidocaine-prilocaine (EMLA) cream Apply to affected area once (Patient taking differently: as needed. Apply to affected area once)    LORazepam (ATIVAN) 1 MG tablet TAKE ONE TABLET BY MOUTH EVERY EIGHT HOURS AS NEEDED    megestrol (MEGACE) 400 mg/10 mL (40 mg/mL) Susp Take 10 mLs (400 mg total) by mouth once daily.    metoprolol succinate (TOPROL-XL) 25 MG 24 hr tablet TAKE ONE TABLET BY MOUTH ONCE DAILY    mometasone 0.1% (ELOCON) 0.1 % cream APPLY TO THE AFFECTED AREA twice daily. use with econazole cream as directed PRN    mouthwashes Soln PHARMACIST:  MIX 50mL Benadryl Elixir; 50mL Viscous Lidocaine; 50mL Nystatin Suspension; 50mL Milk of Magnesia  PATIENT:  Take 5mL by mouth - Swish and swallow - every 4 hours as needed for mouth/throat pain.    ondansetron (ZOFRAN-ODT) 8 MG TbDL Take 1 tablet (8 mg total) by mouth every 12 (twelve) hours as needed.    prochlorperazine (COMPAZINE) 10 MG tablet Take 1 tablet (10 mg total) by  mouth every 6 (six) hours as needed.    quetiapine (SEROQUEL) 100 MG Tab TAKE ONE TABLET BY MOUTH ONCE DAILY IN THE EVENING    saliva substitute combo no.9 (BIOTENE DRY MOUTH ORAL RINSE) Mwsh by Mucous Membrane route.    senna-docusate 8.6-50 mg (SENNA WITH DOCUSATE SODIUM) 8.6-50 mg per tablet Take 1 tablet by mouth once daily.    SYMBICORT 160-4.5 mcg/actuation HFAA inhale TWO puffs BY MOUTH and into lungs TWICE DAILY    triamcinolone acetonide 0.1% (KENALOG) 0.1 % ointment Apply topically 2 (two) times daily. Avoid face, axillae and groin. PRN     Current Facility-Administered Medications   Medication    sodium chloride 0.9% flush 10 mL    sodium chloride 0.9% flush 10 mL       Review of patient's allergies indicates:   Allergen Reactions    Adhesive      Dressing after breast surgery    Carvedilol Itching    Lisinopril      cough    Losartan Nausea Only       Family History   Problem Relation Age of Onset    Heart disease Mother     Diabetes Mother     Heart disease Father        Social History     Social History    Marital status:      Spouse name: N/A    Number of children: N/A    Years of education: N/A     Occupational History    Not on file.     Social History Main Topics    Smoking status: Former Smoker     Packs/day: 0.50     Years: 39.00     Types: Cigarettes     Quit date: 2/11/2009    Smokeless tobacco: Never Used    Alcohol use No    Drug use: No    Sexual activity: No     Other Topics Concern    Not on file     Social History Narrative    No narrative on file       Chief Complaint:   Chief Complaint   Patient presents with    Right Hip - Post-op Evaluation    Post-op Evaluation     s/p tomas 1/3/18       Date of surgery: January 3, 2018    History of present illness: 67-year-old female who is 3 weeks out from right hip hemiarthroplasty done by Dr. Nash.  Patient is doing okay.  Pain as a 7 out of 10.  Doing home health physical therapy.      Review of  Systems:    Musculoskeletal:  See HPI        Physical Examination:    Vital Signs:    Vitals:    01/24/18 0940   BP: 98/66   Pulse: 78       Body mass index is 19.14 kg/m².    This a well-developed, well nourished patient in no acute distress.  They are alert and oriented and cooperative to examination.  Pt. comes in in a wheelchair.      Examination of the right hip shows well-healing surgical incision.  No significant bruising or swelling.  Thigh is soft.  Neurovascularly intact.    X-rays: X-rays of the right hip are ordered and reviewed which show well aligned hip hemiarthroplasty without complication     Assessment:: Status post right hip hemiarthroplasty    Plan:  I reviewed the x-ray with him today.  The x-ray looks great.  I removed her staples.  Continue the home health physical therapy.  Follow-up in 4 weeks.  No x-ray needed.    This note was created using Dragon voice recognition software that occasionally misinterpreted phrases or words.

## 2018-01-25 ENCOUNTER — HOSPITAL ENCOUNTER (OUTPATIENT)
Dept: RADIOLOGY | Facility: HOSPITAL | Age: 68
Discharge: HOME | End: 2018-01-25
Attending: INTERNAL MEDICINE
Payer: MEDICARE

## 2018-01-25 ENCOUNTER — INFUSION (OUTPATIENT)
Dept: INFUSION THERAPY | Facility: HOSPITAL | Age: 68
End: 2018-01-25
Attending: INTERNAL MEDICINE
Payer: MEDICARE

## 2018-01-25 ENCOUNTER — DOCUMENTATION ONLY (OUTPATIENT)
Dept: HEMATOLOGY/ONCOLOGY | Facility: CLINIC | Age: 68
End: 2018-01-25

## 2018-01-25 VITALS
SYSTOLIC BLOOD PRESSURE: 113 MMHG | WEIGHT: 115 LBS | DIASTOLIC BLOOD PRESSURE: 57 MMHG | HEIGHT: 65 IN | TEMPERATURE: 98 F | HEART RATE: 78 BPM | BODY MASS INDEX: 19.16 KG/M2 | RESPIRATION RATE: 14 BRPM

## 2018-01-25 DIAGNOSIS — C34.90 MALIGNANT NEOPLASM OF LUNG, UNSPECIFIED LATERALITY, UNSPECIFIED PART OF LUNG: Primary | ICD-10-CM

## 2018-01-25 DIAGNOSIS — C34.00 MALIGNANT NEOPLASM OF HILUS OF LUNG, UNSPECIFIED LATERALITY: ICD-10-CM

## 2018-01-25 DIAGNOSIS — R52 PAIN: ICD-10-CM

## 2018-01-25 DIAGNOSIS — C34.00 MALIGNANT NEOPLASM OF HILUS OF LUNG, UNSPECIFIED LATERALITY: Primary | ICD-10-CM

## 2018-01-25 PROCEDURE — A9552 F18 FDG: HCPCS | Mod: PO

## 2018-01-25 PROCEDURE — 63600175 PHARM REV CODE 636 W HCPCS: Mod: TB,PN | Performed by: INTERNAL MEDICINE

## 2018-01-25 PROCEDURE — 78815 PET IMAGE W/CT SKULL-THIGH: CPT | Mod: TC,PO,PS

## 2018-01-25 PROCEDURE — 78815 PET IMAGE W/CT SKULL-THIGH: CPT | Mod: 26,PS,, | Performed by: RADIOLOGY

## 2018-01-25 PROCEDURE — 25000003 PHARM REV CODE 250: Mod: PN | Performed by: INTERNAL MEDICINE

## 2018-01-25 PROCEDURE — A4216 STERILE WATER/SALINE, 10 ML: HCPCS | Mod: PN | Performed by: INTERNAL MEDICINE

## 2018-01-25 PROCEDURE — 96413 CHEMO IV INFUSION 1 HR: CPT | Mod: PN

## 2018-01-25 RX ORDER — SODIUM CHLORIDE 0.9 % (FLUSH) 0.9 %
10 SYRINGE (ML) INJECTION
Status: DISCONTINUED | OUTPATIENT
Start: 2018-01-25 | End: 2018-01-25 | Stop reason: HOSPADM

## 2018-01-25 RX ORDER — HEPARIN 100 UNIT/ML
500 SYRINGE INTRAVENOUS
Status: DISCONTINUED | OUTPATIENT
Start: 2018-01-25 | End: 2018-01-25 | Stop reason: HOSPADM

## 2018-01-25 RX ADMIN — SODIUM CHLORIDE 200 MG: 9 INJECTION, SOLUTION INTRAVENOUS at 01:01

## 2018-01-25 RX ADMIN — SODIUM CHLORIDE: 900 INJECTION, SOLUTION INTRAVENOUS at 01:01

## 2018-01-25 RX ADMIN — SODIUM CHLORIDE, PRESERVATIVE FREE 10 ML: 5 INJECTION INTRAVENOUS at 01:01

## 2018-01-25 NOTE — PROGRESS NOTES
Spoke with patient and daughter regarding medication and patient signed consent.  Verbalized understanding.

## 2018-01-26 ENCOUNTER — OFFICE VISIT (OUTPATIENT)
Dept: HEMATOLOGY/ONCOLOGY | Facility: CLINIC | Age: 68
End: 2018-01-26
Payer: MEDICARE

## 2018-01-26 ENCOUNTER — TELEPHONE (OUTPATIENT)
Dept: INFUSION THERAPY | Facility: HOSPITAL | Age: 68
End: 2018-01-26

## 2018-01-26 ENCOUNTER — INFUSION (OUTPATIENT)
Dept: INFUSION THERAPY | Facility: HOSPITAL | Age: 68
End: 2018-01-26
Attending: INTERNAL MEDICINE
Payer: MEDICARE

## 2018-01-26 VITALS
BODY MASS INDEX: 19.9 KG/M2 | SYSTOLIC BLOOD PRESSURE: 81 MMHG | WEIGHT: 119.44 LBS | HEART RATE: 91 BPM | HEIGHT: 65 IN | RESPIRATION RATE: 18 BRPM | DIASTOLIC BLOOD PRESSURE: 51 MMHG | TEMPERATURE: 98 F

## 2018-01-26 VITALS
HEART RATE: 77 BPM | DIASTOLIC BLOOD PRESSURE: 71 MMHG | BODY MASS INDEX: 19.9 KG/M2 | WEIGHT: 119.44 LBS | RESPIRATION RATE: 18 BRPM | SYSTOLIC BLOOD PRESSURE: 109 MMHG | HEIGHT: 65 IN | TEMPERATURE: 98 F

## 2018-01-26 DIAGNOSIS — I25.10 CORONARY ARTERY DISEASE INVOLVING NATIVE CORONARY ARTERY OF NATIVE HEART WITHOUT ANGINA PECTORIS: ICD-10-CM

## 2018-01-26 DIAGNOSIS — I10 ESSENTIAL HYPERTENSION: ICD-10-CM

## 2018-01-26 DIAGNOSIS — C34.00 MALIGNANT NEOPLASM OF HILUS OF LUNG, UNSPECIFIED LATERALITY: ICD-10-CM

## 2018-01-26 DIAGNOSIS — E53.8 B12 DEFICIENCY: Primary | ICD-10-CM

## 2018-01-26 DIAGNOSIS — C50.011 MALIGNANT NEOPLASM OF NIPPLE OF RIGHT BREAST IN FEMALE, UNSPECIFIED ESTROGEN RECEPTOR STATUS: Primary | ICD-10-CM

## 2018-01-26 PROCEDURE — 96360 HYDRATION IV INFUSION INIT: CPT | Mod: PN

## 2018-01-26 PROCEDURE — 99499 UNLISTED E&M SERVICE: CPT | Mod: S$GLB,,, | Performed by: INTERNAL MEDICINE

## 2018-01-26 PROCEDURE — 25000003 PHARM REV CODE 250: Mod: PN | Performed by: INTERNAL MEDICINE

## 2018-01-26 PROCEDURE — 99999 PR PBB SHADOW E&M-EST. PATIENT-LVL III: CPT | Mod: PBBFAC,,, | Performed by: INTERNAL MEDICINE

## 2018-01-26 PROCEDURE — 99215 OFFICE O/P EST HI 40 MIN: CPT | Mod: S$GLB,,, | Performed by: INTERNAL MEDICINE

## 2018-01-26 PROCEDURE — 63600175 PHARM REV CODE 636 W HCPCS: Mod: PN | Performed by: INTERNAL MEDICINE

## 2018-01-26 PROCEDURE — A4216 STERILE WATER/SALINE, 10 ML: HCPCS | Mod: PN | Performed by: INTERNAL MEDICINE

## 2018-01-26 RX ORDER — SODIUM CHLORIDE 0.9 % (FLUSH) 0.9 %
10 SYRINGE (ML) INJECTION
Status: COMPLETED | OUTPATIENT
Start: 2018-01-26 | End: 2018-01-26

## 2018-01-26 RX ORDER — SODIUM CHLORIDE 9 MG/ML
INJECTION, SOLUTION INTRAVENOUS CONTINUOUS
Status: CANCELLED
Start: 2018-01-26

## 2018-01-26 RX ORDER — SODIUM CHLORIDE 9 MG/ML
INJECTION, SOLUTION INTRAVENOUS CONTINUOUS
Status: DISCONTINUED | OUTPATIENT
Start: 2018-01-26 | End: 2018-01-26 | Stop reason: HOSPADM

## 2018-01-26 RX ORDER — HEPARIN 100 UNIT/ML
500 SYRINGE INTRAVENOUS
Status: DISCONTINUED | OUTPATIENT
Start: 2018-01-26 | End: 2018-01-26 | Stop reason: HOSPADM

## 2018-01-26 RX ADMIN — SODIUM CHLORIDE: 900 INJECTION, SOLUTION INTRAVENOUS at 10:01

## 2018-01-26 RX ADMIN — SODIUM CHLORIDE, PRESERVATIVE FREE 10 ML: 5 INJECTION INTRAVENOUS at 11:01

## 2018-01-26 RX ADMIN — HEPARIN 500 UNITS: 100 SYRINGE at 11:01

## 2018-01-26 NOTE — PROGRESS NOTES
This is a 66-year-old  woman known to me.  The patient has been   following from a diagnosis of breast cancer in 2007.  She was treated at [facility].  At that time, mitoxantrone and Cytoxan was used because of significant   extensive coronary artery disease and peripheral vascular disease.  The patient   underwent double mastectomy, radiation therapy and adjuvant therapy all at   [facility], under the guidance of Dr. Downing.  She was seen by me for B12   deficiency, hypertension, diabetes, dyslipidemia, coronary artery disease and   depression, and at the last visit, she had complained of feeling more tired and   sleeping a lot, and had not had a recent follow up with Dr. Ha and hence, we   sent her back for cardiac evaluation.  The patient had imaging study done, an   echocardiogram that showed a mass that was wrapping around the heart.  This   raised immediate suspicion.  The patient had been seen by Dr. Hopkins in lieu of   Dr. Mclaughlin, and the patient's family practice doctor, Dr. King, was   contacted.  We asked for an immediate biopsy of this mass.  The films were   discussed with Dr. Martin.  Interventional radiology-guided biopsy was   recommended.  The patient was sent for biopsy with Dr. Connolly at [facility].  Pathology comes back as squamous cell CA.  Pt started carbo / taxol  Rpt PET showing improvement but had fatigue and failed to thrive, pt started afatinib and had s/e, then fell broke her hip went to sx and is noew 2 weeks post op in a wheel chair has been off afatinib  Got PET done and recd keytruda yesterady so far doing well. Here to discuss PET    EXAMINATION:  Wt Readings from Last 3 Encounters:   01/25/18 52.2 kg (115 lb)   01/24/18 52.2 kg (115 lb)   01/19/18 52.2 kg (115 lb)     Temp Readings from Last 3 Encounters:   01/25/18 97.9 °F (36.6 °C)   01/19/18 98.2 °F (36.8 °C)   01/08/18 97.5 °F (36.4 °C)     BP Readings from Last 3 Encounters:   01/25/18  (!) 113/57   01/24/18 98/66   01/19/18 (!) 101/52   this pm 86/60 bp  Pulse Readings from Last 3 Encounters:   01/25/18 78   01/24/18 78   01/19/18 79   GENERAL:  Weak and has lost weight, she is tearful and wants to go back on chemo thinks that helped her and ois not ready to die  NEURO:  Awake, alert and oriented to time, person and place.  The patient   demonstrates no evidence of depression, anxiety or agitation.  Patient is   cooperative with exam and discussion.  EYES:  Normal conjunctivae and eyelids.  PERRLA 3 to 4 mm without icterus.  ENT AND MOUTH:  External appearance of ears and nose normal without scars,   lesions or masses.  Nasal mucosa, turbinates and septum are normal.  No ENT   drainage and dried blood noted.  No tenderness over frontal or maxillary sinus.    Lips and gums are normal.  Dentition is normal.  NECK:  Supple.  Trachea is central.  No crepitus.  No JVD.  No thyromegaly or   masses.   RESPIRATORY:  No intercostal retractions and no use of accessory muscles of   respirations noted.  No areas of dullness to percussion.  Chest is clear to   auscultation.  No rales, rhonchi or wheezes.  No crepitus.  Good air entry   bilaterally.  CARDIOVASCULAR:  No cardiomegaly.  Normal location.  No thrills or heaviness.    Normal carotid pulse.  No bruits.  S1 and S2 are normally heard without murmurs   or gallops.  All peripheral pulses, including pedal pulses, are present.  ABDOMEN:  Normal abdomen.  No hepatosplenomegaly.  No free fluid.  Bowel sounds   are present.  No hernia noted.  No masses.  No rebound or tenderness.  No   guarding or rigidity.   Umbilicus is midline.  LYMPHATICS:  No axillary, cervical, supraclavicular, submental, or inguinal   lymphadenopathy.  SKIN AND MUSCULOSKELETAL:  There is no evidence of excoriation marks or   ecchymosis.  No rashes.  No pigmented lesions, induration or subcutaneous   nodules.  No sores or abnormal moles.  No cyanosis.  No clubbing.  Nailbed    abnormalities are noted. No joint or skeletal deformities noted.  Normal range   of motion.  BREASTS:  No abnormal masses or discharge.  NEUROLOGIC:  Higher functions are appropriate.  No cranial nerve deficits.    Normal gait.  Normal strength.  Motor and sensory functions are normal.  Deep   tendon reflexes are normal without Babinski's sign or ankle clonus.  GENITAL AND RECTAL:  Exams are deferred.    LABORATORIES:    Lab Results   Component Value Date    WBC 4.74 01/19/2018    HGB 10.6 (L) 01/19/2018    HCT 33.7 (L) 01/19/2018    MCV 95 01/19/2018     01/19/2018     CMP  Sodium   Date Value Ref Range Status   01/19/2018 143 136 - 145 mmol/L Final     Potassium   Date Value Ref Range Status   01/19/2018 3.4 (L) 3.5 - 5.1 mmol/L Final     Chloride   Date Value Ref Range Status   01/19/2018 106 95 - 110 mmol/L Final     CO2   Date Value Ref Range Status   01/19/2018 27 22 - 31 mmol/L Final     Glucose   Date Value Ref Range Status   01/19/2018 127 (H) 70 - 110 mg/dL Final     Comment:     The ADA recommends the following guidelines for fasting glucose:  Normal:       less than 100 mg/dL  Prediabetes:  100 mg/dL to 125 mg/dL  Diabetes:     126 mg/dL or higher       BUN, Bld   Date Value Ref Range Status   01/19/2018 15 7 - 18 mg/dL Final     Creatinine   Date Value Ref Range Status   01/19/2018 0.80 0.50 - 1.40 mg/dL Final     Calcium   Date Value Ref Range Status   01/19/2018 8.3 (L) 8.4 - 10.2 mg/dL Final     Total Protein   Date Value Ref Range Status   01/19/2018 7.2 6.0 - 8.4 g/dL Final     Albumin   Date Value Ref Range Status   01/19/2018 3.4 (L) 3.5 - 5.2 g/dL Final     Total Bilirubin   Date Value Ref Range Status   01/19/2018 0.9 0.2 - 1.3 mg/dL Final     Alkaline Phosphatase   Date Value Ref Range Status   01/19/2018 95 38 - 145 U/L Final     AST   Date Value Ref Range Status   01/19/2018 35 14 - 36 U/L Final     ALT   Date Value Ref Range Status   01/19/2018 30 10 - 44 U/L Final     Anion Gap    Date Value Ref Range Status   01/19/2018 10 8 - 16 mmol/L Final     eGFR if    Date Value Ref Range Status   01/19/2018 >60 >60 mL/min/1.73 m^2 Final     eGFR if non    Date Value Ref Range Status   01/19/2018 >60 >60 mL/min/1.73 m^2 Final     Comment:     Calculation used to obtain the estimated glomerular filtration  rate (eGFR) is the CKD-EPI equation.       PET 10/2017 shows improvement in size and hypermetabolic activity  IMPRESSION:  Stage IV squamous cell lung cancer with involvement around the   ventricle, but no other metastatic disease.    PLAN:  Healing fracture, still in wheel chair  Impression         1.Mixed response since 10/19/17. The primary mass appears more hypermetabolic than on the prior and similar to slightly larger in size. A new hypermetabolic mediastinal node is noted. Index nodes appear similar if not slightly improved since the prior and the consolidative process in the right middle lobe seen on the prior appears to have resolved. A new pulmonary nodule is noted worrisome for neoplastic disease        rtc fro keytruda cycle #2   with cbc, cmp   mixed response that's acceptable as pt has been on rx break since xmas  Cont to watch bp need fl;uids today as she is hypotensive. But hasnr eaten today

## 2018-01-26 NOTE — PLAN OF CARE
Problem: Patient Care Overview (Adult)  Goal: Plan of Care Review  Outcome: Outcome(s) achieved Date Met: 01/26/18  Pt arrived for IVFs. PAC accessed X 1 attempt to RCW, + blood return noted/flushes easily. Pt tolerated treatment very well. PAC flushed/heparinized & de accessed w/o difficulty. Pt D/C'd home with family & instructions via use of W/C

## 2018-01-26 NOTE — TELEPHONE ENCOUNTER
[1/26/2018 10:19 AM] Nisha Lewis:   they are sending down wager [id number] for fluids can you verify how much please  [1/26/2018 10:22 AM] Radha Stanley:    500 mL only

## 2018-02-01 NOTE — TELEPHONE ENCOUNTER
----- Message from Isabelle Zeng sent at 2/1/2018  1:52 PM CST -----  Contact: self  Patient is calling regarding order for walker and a potty chair. Patient states [employer] insurance sent forms to be filled out and to sign and they are waiting for them to be returned. Please call patient at [phone]

## 2018-02-07 ENCOUNTER — TELEPHONE (OUTPATIENT)
Dept: HEMATOLOGY/ONCOLOGY | Facility: CLINIC | Age: 68
End: 2018-02-07

## 2018-02-08 ENCOUNTER — TELEPHONE (OUTPATIENT)
Dept: HEMATOLOGY/ONCOLOGY | Facility: CLINIC | Age: 68
End: 2018-02-08

## 2018-02-08 NOTE — TELEPHONE ENCOUNTER
----- Message from Kim Arndt sent at 2/8/2018 12:09 PM CST -----  Contact: Evelin Saunders (Daughter)  Evelin Saunders (Daughter) calling to schedule an appointment tomorrow 2/9/18 after a hospital visit 2/07/18. Please advise.   Call back    Thanks.

## 2018-02-08 NOTE — TELEPHONE ENCOUNTER
Returned call to patient's daughter, Evelin, discussed that patient has been see in ER for apparent infection port, had iv fluids and iv antibiotic at ER, sent home on 2 oral antibiotics and should keep follow up with labs and infusion as scheduled on 2/15/18. Daughter voiced understanding and appreciation.

## 2018-02-15 ENCOUNTER — OFFICE VISIT (OUTPATIENT)
Dept: HEMATOLOGY/ONCOLOGY | Facility: CLINIC | Age: 68
End: 2018-02-15
Payer: MEDICARE

## 2018-02-15 ENCOUNTER — INFUSION (OUTPATIENT)
Dept: INFUSION THERAPY | Facility: HOSPITAL | Age: 68
End: 2018-02-15
Attending: INTERNAL MEDICINE
Payer: MEDICARE

## 2018-02-15 ENCOUNTER — HOSPITAL ENCOUNTER (OUTPATIENT)
Dept: RADIOLOGY | Facility: HOSPITAL | Age: 68
Discharge: HOME | End: 2018-02-15
Attending: NURSE PRACTITIONER
Payer: MEDICARE

## 2018-02-15 VITALS
DIASTOLIC BLOOD PRESSURE: 63 MMHG | HEART RATE: 97 BPM | WEIGHT: 115.5 LBS | RESPIRATION RATE: 17 BRPM | SYSTOLIC BLOOD PRESSURE: 112 MMHG | TEMPERATURE: 98 F | HEIGHT: 65 IN | BODY MASS INDEX: 19.24 KG/M2

## 2018-02-15 VITALS
SYSTOLIC BLOOD PRESSURE: 109 MMHG | TEMPERATURE: 98 F | RESPIRATION RATE: 17 BRPM | HEART RATE: 87 BPM | DIASTOLIC BLOOD PRESSURE: 67 MMHG

## 2018-02-15 DIAGNOSIS — Z85.3 HISTORY OF BREAST CANCER: ICD-10-CM

## 2018-02-15 DIAGNOSIS — C34.90 MALIGNANT NEOPLASM OF LUNG, UNSPECIFIED LATERALITY, UNSPECIFIED PART OF LUNG: ICD-10-CM

## 2018-02-15 DIAGNOSIS — C34.00 MALIGNANT NEOPLASM OF HILUS OF LUNG, UNSPECIFIED LATERALITY: ICD-10-CM

## 2018-02-15 DIAGNOSIS — R52 PAIN: ICD-10-CM

## 2018-02-15 DIAGNOSIS — C34.00 MALIGNANT NEOPLASM OF HILUS OF LUNG, UNSPECIFIED LATERALITY: Primary | ICD-10-CM

## 2018-02-15 DIAGNOSIS — C34.90 MALIGNANT NEOPLASM OF LUNG, UNSPECIFIED LATERALITY, UNSPECIFIED PART OF LUNG: Primary | ICD-10-CM

## 2018-02-15 DIAGNOSIS — T80.219S INFECTION OF VENOUS ACCESS PORT, SEQUELA: ICD-10-CM

## 2018-02-15 PROCEDURE — 71046 X-RAY EXAM CHEST 2 VIEWS: CPT | Mod: 26,,, | Performed by: RADIOLOGY

## 2018-02-15 PROCEDURE — 71046 X-RAY EXAM CHEST 2 VIEWS: CPT | Mod: TC,FY,PO

## 2018-02-15 PROCEDURE — 25000003 PHARM REV CODE 250: Mod: PN | Performed by: NURSE PRACTITIONER

## 2018-02-15 PROCEDURE — 99499 UNLISTED E&M SERVICE: CPT | Mod: S$GLB,,, | Performed by: NURSE PRACTITIONER

## 2018-02-15 PROCEDURE — 63600175 PHARM REV CODE 636 W HCPCS: Mod: TB,PN | Performed by: NURSE PRACTITIONER

## 2018-02-15 PROCEDURE — 3008F BODY MASS INDEX DOCD: CPT | Mod: S$GLB,,, | Performed by: NURSE PRACTITIONER

## 2018-02-15 PROCEDURE — 99214 OFFICE O/P EST MOD 30 MIN: CPT | Mod: S$GLB,,, | Performed by: NURSE PRACTITIONER

## 2018-02-15 PROCEDURE — 99999 PR PBB SHADOW E&M-EST. PATIENT-LVL V: CPT | Mod: PBBFAC,,, | Performed by: NURSE PRACTITIONER

## 2018-02-15 PROCEDURE — 96413 CHEMO IV INFUSION 1 HR: CPT | Mod: PN

## 2018-02-15 PROCEDURE — 1125F AMNT PAIN NOTED PAIN PRSNT: CPT | Mod: S$GLB,,, | Performed by: NURSE PRACTITIONER

## 2018-02-15 PROCEDURE — 1159F MED LIST DOCD IN RCRD: CPT | Mod: S$GLB,,, | Performed by: NURSE PRACTITIONER

## 2018-02-15 PROCEDURE — A4216 STERILE WATER/SALINE, 10 ML: HCPCS | Mod: PN | Performed by: NURSE PRACTITIONER

## 2018-02-15 RX ORDER — FENTANYL 12.5 UG/1
1 PATCH TRANSDERMAL
Qty: 10 PATCH | Refills: 0 | Status: SHIPPED | OUTPATIENT
Start: 2018-02-15

## 2018-02-15 RX ORDER — HYDROCODONE BITARTRATE AND ACETAMINOPHEN 5; 325 MG/1; MG/1
2 TABLET ORAL EVERY 4 HOURS PRN
Qty: 100 TABLET | Refills: 0 | Status: SHIPPED | OUTPATIENT
Start: 2018-02-15

## 2018-02-15 RX ORDER — HEPARIN 100 UNIT/ML
500 SYRINGE INTRAVENOUS
Status: CANCELLED | OUTPATIENT
Start: 2018-02-15

## 2018-02-15 RX ORDER — SODIUM CHLORIDE 0.9 % (FLUSH) 0.9 %
10 SYRINGE (ML) INJECTION
Status: CANCELLED | OUTPATIENT
Start: 2018-02-15

## 2018-02-15 RX ORDER — GABAPENTIN 300 MG/1
300 CAPSULE ORAL 2 TIMES DAILY
Qty: 60 CAPSULE | Refills: 11 | Status: SHIPPED | OUTPATIENT
Start: 2018-02-15

## 2018-02-15 RX ORDER — SODIUM CHLORIDE 0.9 % (FLUSH) 0.9 %
10 SYRINGE (ML) INJECTION
Status: DISCONTINUED | OUTPATIENT
Start: 2018-02-15 | End: 2018-02-15 | Stop reason: HOSPADM

## 2018-02-15 RX ORDER — FENTANYL 25 UG/1
1 PATCH TRANSDERMAL
Qty: 10 PATCH | Refills: 0 | Status: SHIPPED | OUTPATIENT
Start: 2018-02-15

## 2018-02-15 RX ADMIN — SODIUM CHLORIDE: 9 INJECTION, SOLUTION INTRAVENOUS at 02:02

## 2018-02-15 RX ADMIN — SODIUM CHLORIDE 200 MG: 9 INJECTION, SOLUTION INTRAVENOUS at 02:02

## 2018-02-15 RX ADMIN — SODIUM CHLORIDE, PRESERVATIVE FREE 10 ML: 5 INJECTION INTRAVENOUS at 02:02

## 2018-02-15 NOTE — PLAN OF CARE
Problem: Chemotherapy Effects (Adult)  Goal: Signs and Symptoms of Listed Potential Problems Will be Absent, Minimized or Managed (Chemotherapy Effects)  Signs and symptoms of listed potential problems will be absent, minimized or managed by discharge/transition of care (reference Chemotherapy Effects (Adult) CPG).   Outcome: Ongoing (interventions implemented as appropriate)  R chest wall port infected On antbx   Periperal IV started today for chemo RBVO R WilsonNP SSCALLONRN

## 2018-02-15 NOTE — PROGRESS NOTES
HISTORY OF PRESENT ILLNESS:  This is a 67-year-old  woman known to Dr. Brothers for a recent diagnosis of   Stage IV Squamous Cell Carcinoma of the Lung.     To note:  The patient has been following Dr. Brothers from a diagnosis of breast cancer in 2007.  She was treated at [facility].    At that time, mitoxantrone and Cytoxan was used because of significant extensive coronary artery disease and peripheral vascular disease.    The patient underwent double mastectomy, radiation therapy and adjuvant therapy under the guidance of Dr. Downing ([facility]).    She was seen by Dr. Brothers for B12 deficiency, hypertension, diabetes, dyslipidemia, coronary artery disease and depression.    Recent fatigue led to follow up with Dr. Ha.  Imaging study done, an echocardiogram showed a mass that was wrapping around the heart.    Immediate biopsy of this mass was done.  Interventional radiology-guided biopsy was recommended and done by Dr. Connolly at [facility].  Pathology came back as squamous cell CA.  Pt began Carboplatin/Taxol;  Repeat PET showed improvement, but fatigue   and failure to thrive persisted. The patient was subsequently started on Afatinib.  She experienced side effects & fell broke her hip.   She   was later transitioned to Keytruda.     She reports coughing up blood tinged sputum and having increased weakness and need for oxygen.  She reports that she presented to   the ER on 02/07/18 as her port (RCW) was red and warm.  She was given an IV antibiotics and sent home on Augmentin, Bactrim & Diflucan.    She denies any fevers, chills, abdominal discomfort, nausea, vomiting, diarrhea, bleeding, etc.  Her port is still slightly red.  She completed   her antibiotic today.  She reports that she is taking 2 Norco's every 4 hours for hip/bone pain.  Her Fentanyl patch is 25 mcg per hour.    No other new complaints or pertinent findings on a 10 point review of systems.      PHYSICAL  EXAMINATION:  Wt Readings from Last 3 Encounters:   02/15/18 52.4 kg (115 lb 8.3 oz)   02/07/18 53.3 kg (117 lb 8.1 oz)   01/26/18 54.2 kg (119 lb 7.1 oz)     Temp Readings from Last 3 Encounters:   02/15/18 98.3 °F (36.8 °C)   02/15/18 98.3 °F (36.8 °C)   02/08/18 98.8 °F (37.1 °C) (Oral)     BP Readings from Last 3 Encounters:   02/15/18 112/63   02/15/18 112/63   02/08/18 107/68   this pm 86/60 bp  Pulse Readings from Last 3 Encounters:   02/15/18 97   02/15/18 97   02/08/18 88   GENERAL:  WD, thin, ill-appearing white female sitting in a wheelchair.  Alert & oriented x 3.  HEENT:  Normocephalic, atraumatic.  Oral mucosa pink and moist.  Lips without   lesions.  Tongue midline.  Oropharynx clear.  Nonicteric sclerae.  NECK:  Supple, no adenopathy.  No carotid bruits, thyromegaly or thyroid nodule.  HEART:  Regular rate and rhythm without murmur, gallop or rub.  LUNGS: Decreased breath sounds to bases bilateral.   Shallow respiratory effort.  Oxygen to nares.  ABDOMEN:  Soft,  non-tender, nondistended with positive normoactive bowel sounds,   no hepatosplenomegaly.  EXTREMITIES:  No cyanosis, clubbing or edema.  Distal pulses are intact.  AXILLAE AND GROIN:  No palpable pathologic lymphadenopathy is appreciated.  SKIN:  Intact/turgor normal.    LABORATORIES:    Lab Results   Component Value Date    WBC 6.16 02/15/2018    HGB 10.1 (L) 02/15/2018    HCT 30.9 (L) 02/15/2018    MCV 94 02/15/2018     02/15/2018   unremarkable differential    CMP  Sodium   Date Value Ref Range Status   02/15/2018 138 136 - 145 mmol/L Final     Potassium   Date Value Ref Range Status   02/15/2018 4.3 3.5 - 5.1 mmol/L Final     Chloride   Date Value Ref Range Status   02/15/2018 100 95 - 110 mmol/L Final     CO2   Date Value Ref Range Status   02/15/2018 24 22 - 31 mmol/L Final     Glucose   Date Value Ref Range Status   02/15/2018 106 70 - 110 mg/dL Final     Comment:     The ADA recommends the following guidelines for fasting  glucose:  Normal:       less than 100 mg/dL  Prediabetes:  100 mg/dL to 125 mg/dL  Diabetes:     126 mg/dL or higher       BUN, Bld   Date Value Ref Range Status   02/15/2018 25 (H) 7 - 18 mg/dL Final     Creatinine   Date Value Ref Range Status   02/15/2018 1.39 0.50 - 1.40 mg/dL Final     Calcium   Date Value Ref Range Status   02/15/2018 9.6 8.4 - 10.2 mg/dL Final     Total Protein   Date Value Ref Range Status   02/07/2018 8.0 6.0 - 8.4 g/dL Final     Albumin   Date Value Ref Range Status   02/07/2018 3.9 3.5 - 5.2 g/dL Final     Total Bilirubin   Date Value Ref Range Status   02/07/2018 0.8 0.2 - 1.3 mg/dL Final     Alkaline Phosphatase   Date Value Ref Range Status   02/07/2018 103 38 - 145 U/L Final     AST   Date Value Ref Range Status   02/07/2018 47 (H) 14 - 36 U/L Final     ALT   Date Value Ref Range Status   02/07/2018 33 10 - 44 U/L Final     Anion Gap   Date Value Ref Range Status   02/15/2018 14 8 - 16 mmol/L Final     eGFR if    Date Value Ref Range Status   02/15/2018 46 (A) >60 mL/min/1.73 m^2 Final     eGFR if non    Date Value Ref Range Status   02/15/2018 40 (A) >60 mL/min/1.73 m^2 Final     Comment:     Calculation used to obtain the estimated glomerular filtration  rate (eGFR) is the CKD-EPI equation.        IMPRESSION:    1.  Stage IV squamous cell lung cancer with involvement around the ventricle, but no other metastatic disease.  2.  Pain - Increased Fentanyl to 37 mcg/hr; refill Norco & gabapentin  3.  Infected port    PLAN:  1.  Proceed with Cycle 2 of Keytruda and return to clinic in 3 weeks with interval CBC, CMP, MG.  2.  Obtain CXR for evaluation in regards to increased SOB & hemoptysis - phone review  3.  Monitor BP  4.  Do not use port today; peripheral IV; evaluate site within the week.    Assessment/plan reviewed and approved by Dr. Caballero.

## 2018-02-15 NOTE — PLAN OF CARE
Problem: Patient Care Overview (Adult)  Goal: Discharge Needs Assessment  Outcome: Ongoing (interventions implemented as appropriate)  Pt tolerated keytruda well. Pt discharged via wc accompanied by daughter. Return appointment confirmed with patient [id number]

## 2018-02-16 ENCOUNTER — TELEPHONE (OUTPATIENT)
Dept: HEMATOLOGY/ONCOLOGY | Facility: CLINIC | Age: 68
End: 2018-02-16

## 2018-02-16 DIAGNOSIS — E11.9 TYPE 2 DIABETES MELLITUS WITHOUT COMPLICATION: ICD-10-CM

## 2018-02-16 NOTE — TELEPHONE ENCOUNTER
----- Message from Isabelle Zeng sent at 2/16/2018  1:57 PM CST -----  Contact: daughter, Evelin Saunders  Patient's daughter, Evelin Saunders is calling for Xray results. Please call patient's daughter at [phone]. Thanks!

## 2018-02-23 ENCOUNTER — PATIENT OUTREACH (OUTPATIENT)
Dept: ADMINISTRATIVE | Facility: HOSPITAL | Age: 68
End: 2018-02-23

## 2018-02-23 NOTE — PROGRESS NOTES
Health Maintenance Due   Topic Date Due    Hepatitis C Screening  1950    TETANUS VACCINE  12/09/1969    Colonoscopy  12/09/2001    Influenza Vaccine  08/01/2017    Eye Exam  08/04/2017    Hemoglobin A1c  02/03/2018

## 2018-02-26 ENCOUNTER — TELEPHONE (OUTPATIENT)
Dept: HEMATOLOGY/ONCOLOGY | Facility: CLINIC | Age: 68
End: 2018-02-26

## 2018-02-26 NOTE — TELEPHONE ENCOUNTER
----- Message from Larisa Cardoza sent at 2/26/2018  2:29 PM CST -----  Contact: Daughter  Evelin, daughter and Patient [phone], Calling to get an appointment with Dr Brothers for a monthly check up and labs before her Chemo appointment on 3/8/18. Please advise. Thanks.

## 2018-02-27 NOTE — TELEPHONE ENCOUNTER
Returned call to patient's daughter, Evelin. Discussed 3/8/18 labs, f/u and infusion appts starting at 12:30 pm. Evelin will view on [facility] as well.

## 2018-02-28 DIAGNOSIS — F31.9 BIPOLAR AFFECTIVE DISORDER: ICD-10-CM

## 2018-03-02 DIAGNOSIS — S72.001D CLOSED FRACTURE OF NECK OF RIGHT FEMUR WITH ROUTINE HEALING, SUBSEQUENT ENCOUNTER: Primary | ICD-10-CM

## 2018-03-02 RX ORDER — QUETIAPINE FUMARATE 100 MG/1
TABLET, FILM COATED ORAL
Qty: 30 TABLET | Refills: 11 | Status: SHIPPED | OUTPATIENT
Start: 2018-03-02

## 2018-03-05 ENCOUNTER — TELEPHONE (OUTPATIENT)
Dept: HEMATOLOGY/ONCOLOGY | Facility: CLINIC | Age: 68
End: 2018-03-05

## 2018-03-05 DIAGNOSIS — C50.919 MALIGNANT NEOPLASM OF FEMALE BREAST, UNSPECIFIED ESTROGEN RECEPTOR STATUS, UNSPECIFIED LATERALITY, UNSPECIFIED SITE OF BREAST: Primary | ICD-10-CM

## 2018-03-05 NOTE — TELEPHONE ENCOUNTER
Returned call and informed Evelin that I have cancelled pts appts.  Evelin verbalizes understanding.

## 2018-03-05 NOTE — TELEPHONE ENCOUNTER
----- Message from Isabelle Zeng sent at 3/5/2018  1:20 PM CST -----  Contact: daughter, Evelin Saunders  Patient's daughter, Evelin Saunders calling to cancel all her appointments due to she is on Hospice now. Please call daughter at [phone]. Thanks!

## 2018-03-16 ENCOUNTER — TELEPHONE (OUTPATIENT)
Dept: FAMILY MEDICINE | Facility: CLINIC | Age: 68
End: 2018-03-16

## 2018-03-16 ENCOUNTER — TELEPHONE (OUTPATIENT)
Dept: HEMATOLOGY/ONCOLOGY | Facility: CLINIC | Age: 68
End: 2018-03-16

## 2018-03-16 NOTE — TELEPHONE ENCOUNTER
----- Message from Thuy Moreno sent at 3/16/2018  2:28 PM CDT -----  Pt 's daughter Evelin Saunders / [phone] advising that her mother passed away this morning

## 2018-03-16 NOTE — TELEPHONE ENCOUNTER
----- Message from Thuy Moreno sent at 3/16/2018  2:23 PM CDT -----  Pt 's daughter Evelin Saunders / [phone] advising that her mother passed away this morning

## 2018-03-19 ENCOUNTER — TELEPHONE (OUTPATIENT)
Dept: HEMATOLOGY/ONCOLOGY | Facility: CLINIC | Age: 68
End: 2018-03-19

## 2018-04-04 ENCOUNTER — TELEPHONE (OUTPATIENT)
Dept: HEMATOLOGY/ONCOLOGY | Facility: CLINIC | Age: 68
End: 2018-04-04

## 2018-04-04 NOTE — TELEPHONE ENCOUNTER
Daughter here upset that 's office has listed on the death certificate that patient  due to breast cancer and therefore is an issue with the  [employer] police. Reviewed patient's chart, discussed with Dr. Alvarado as patient was on [facility]. Called coroners office and spoke with Thuy Dunaway, who stated she would need record of patient's resolution of breast cancer and diagnosis of lung cancer and heart ventricle involvement. Faxed to [employer]'s progress note date of service 2017 which discussed in detail all of the above. Thuy will submit that note along with a letter in order to request change of cause of death on death certificate. Dr. Alvarado concurred with the handling of this matter.     [facility] 's Office contact information  Thuy Watts, , Assistant     Phone: [phone]  Fax: [phone]

## 2019-08-23 NOTE — NURSING
Ok to strt PIV in left forearm. Rcw port infected and not to be used. RBVO R Brendan/ ENEDINA  
(3) walks occasionally

## 2020-11-12 NOTE — TELEPHONE ENCOUNTER
Patient reports she started Gilotrif 12/5/2017.  She has not missed any doses, but side effects have been problematic.      Diarrhea - presented 12/6/2017 and has continued since.  Patient started with Imodium and transitioned to kaopectate OTC.  She reports she gets diarrhea within 10-15 minutes of eating.  She has started wearing Depends to prevent any accidents.  Her last stool today she reports as black and tarry.    Stomatitis - Patient reports sores in her mouth as well as redness and tenderness in her tongue and back of throat.  She has been using Biotene with no relief.  She denies any swelling or SOB.    Acneiform rash - Patient reports rash with white heads around her mouth.  
CONSTITUTIONAL: well developed, well nourished, in no acute distress, speaking in full sentences, nontoxic appearing  SKIN: warm, dry, no rash  HEAD: normocephalic, atraumatic  EYES: PERRL, EOMI, no conjunctival erythema, no nystagmus  ENT: patent airway, moist mucous membranes, no tongue deviation  NECK: supple, no masses, full flexion/extension without pain  CV:  regular rate, regular rhythm, 2+ radial pulses bilaterally  RESP: no wheezes, no rales, no rhonchi, normal work of breathing  ABD: soft, nontender, nondistended, no rebound, no guarding  RECTAL: nonthrombosed nonpainful external hemorroid x1, no melena, no blood  MSK: normal ROM, no cyanosis, no edema  NEURO: alert, oriented, CN 2-12 grossly intact, sensation intact to light touch symmetrically, 5/5 motor strength in all extremities, normal finger to nose, normal rapid repetitive alternating movements, no pronator drift, no facial asymmetry, normal gait  PSYCH: cooperative, appropriate
